# Patient Record
Sex: MALE | Race: WHITE | NOT HISPANIC OR LATINO | Employment: UNEMPLOYED | URBAN - METROPOLITAN AREA
[De-identification: names, ages, dates, MRNs, and addresses within clinical notes are randomized per-mention and may not be internally consistent; named-entity substitution may affect disease eponyms.]

---

## 2017-04-29 LAB
EST. AVERAGE GLUCOSE BLD GHB EST-MCNC: 123 (CALC)
EST. AVERAGE GLUCOSE BLD GHB EST-SCNC: 6.8 (CALC)
HBA1C MFR BLD: 5.9 % OF TOTAL HGB

## 2018-06-22 ENCOUNTER — TELEPHONE (OUTPATIENT)
Dept: FAMILY MEDICINE CLINIC | Facility: CLINIC | Age: 58
End: 2018-06-22

## 2018-06-25 ENCOUNTER — TELEPHONE (OUTPATIENT)
Dept: FAMILY MEDICINE CLINIC | Facility: CLINIC | Age: 58
End: 2018-06-25

## 2018-06-26 ENCOUNTER — TELEPHONE (OUTPATIENT)
Dept: FAMILY MEDICINE CLINIC | Facility: CLINIC | Age: 58
End: 2018-06-26

## 2018-06-26 DIAGNOSIS — I63.89 CEREBROVASCULAR ACCIDENT (CVA) DUE TO OTHER MECHANISM (HCC): Primary | ICD-10-CM

## 2018-06-26 NOTE — TELEPHONE ENCOUNTER
Francesco from patient insurance company called stated that patient asked him to call the office to see if he could get  Threw that he is waiting for a script for physical therapy I did send a message on 6/25/18 also to you I informed isrrael from the insurance company that we just went live with new computer system that patients need to be patience with everyone      Pratima Sims

## 2018-06-28 ENCOUNTER — TELEPHONE (OUTPATIENT)
Dept: FAMILY MEDICINE CLINIC | Facility: CLINIC | Age: 58
End: 2018-06-28

## 2018-06-28 RX ORDER — LORAZEPAM 1 MG/1
1 TABLET ORAL EVERY 6 HOURS PRN
COMMUNITY
Start: 2018-04-03 | End: 2018-08-06 | Stop reason: SDUPTHER

## 2018-06-28 RX ORDER — LEVETIRACETAM 1000 MG/1
1000 TABLET ORAL 2 TIMES DAILY
COMMUNITY
Start: 2018-05-29 | End: 2018-06-29 | Stop reason: SDUPTHER

## 2018-06-28 RX ORDER — GABAPENTIN 100 MG/1
1 CAPSULE ORAL 2 TIMES DAILY
Refills: 3 | COMMUNITY
Start: 2018-06-04 | End: 2018-08-15 | Stop reason: SDUPTHER

## 2018-06-28 RX ORDER — ATORVASTATIN CALCIUM 20 MG/1
1 TABLET, FILM COATED ORAL DAILY
COMMUNITY
Start: 2018-04-11 | End: 2018-11-12 | Stop reason: SDUPTHER

## 2018-06-28 RX ORDER — LEVETIRACETAM 1000 MG/1
1000 TABLET ORAL EVERY 12 HOURS
Refills: 0 | COMMUNITY
Start: 2018-05-29 | End: 2018-06-28 | Stop reason: SDUPTHER

## 2018-06-28 RX ORDER — LOSARTAN POTASSIUM 50 MG/1
1 TABLET ORAL DAILY
COMMUNITY
Start: 2018-04-05 | End: 2019-06-07 | Stop reason: SDUPTHER

## 2018-06-28 RX ORDER — ASPIRIN 81 MG/1
1 TABLET, CHEWABLE ORAL EVERY 24 HOURS
COMMUNITY
Start: 2014-02-25

## 2018-06-28 RX ORDER — SERTRALINE HYDROCHLORIDE 100 MG/1
1 TABLET, FILM COATED ORAL DAILY
COMMUNITY
Start: 2018-04-11 | End: 2019-06-12 | Stop reason: SDUPTHER

## 2018-06-28 RX ORDER — LANCETS
EACH MISCELLANEOUS DAILY
COMMUNITY
Start: 2016-12-28 | End: 2021-08-18 | Stop reason: SDUPTHER

## 2018-06-28 NOTE — TELEPHONE ENCOUNTER
Pt called stating he needs a refill on his Levetracin  He states he takes it twice a day but only has two left  He did state that it needs a prior [facility]  Pt can be reached at [phone]

## 2018-06-29 ENCOUNTER — TELEPHONE (OUTPATIENT)
Dept: NEUROLOGY | Facility: CLINIC | Age: 58
End: 2018-06-29

## 2018-06-29 DIAGNOSIS — G40.89 OTHER SEIZURES (HCC): Primary | ICD-10-CM

## 2018-06-29 RX ORDER — LEVETIRACETAM 1000 MG/1
1000 TABLET ORAL 2 TIMES DAILY
Qty: 60 TABLET | Refills: 5 | Status: SHIPPED | OUTPATIENT
Start: 2018-06-29 | End: 2018-07-24 | Stop reason: SDUPTHER

## 2018-07-24 ENCOUNTER — APPOINTMENT (OUTPATIENT)
Dept: LAB | Facility: HOSPITAL | Age: 58
End: 2018-07-24
Payer: COMMERCIAL

## 2018-07-24 ENCOUNTER — OFFICE VISIT (OUTPATIENT)
Dept: NEUROLOGY | Facility: CLINIC | Age: 58
End: 2018-07-24
Payer: COMMERCIAL

## 2018-07-24 VITALS
HEIGHT: 70 IN | SYSTOLIC BLOOD PRESSURE: 113 MMHG | RESPIRATION RATE: 18 BRPM | BODY MASS INDEX: 27.03 KG/M2 | WEIGHT: 188.8 LBS | HEART RATE: 79 BPM | DIASTOLIC BLOOD PRESSURE: 70 MMHG

## 2018-07-24 DIAGNOSIS — G40.89 OTHER SEIZURES (HCC): ICD-10-CM

## 2018-07-24 DIAGNOSIS — I69.354 HEMIPARESIS AFFECTING LEFT SIDE AS LATE EFFECT OF CEREBROVASCULAR ACCIDENT (HCC): ICD-10-CM

## 2018-07-24 DIAGNOSIS — G40.89 OTHER SEIZURES (HCC): Primary | ICD-10-CM

## 2018-07-24 DIAGNOSIS — Z72.0 TOBACCO ABUSE: ICD-10-CM

## 2018-07-24 DIAGNOSIS — I65.23 CAROTID STENOSIS, BILATERAL: ICD-10-CM

## 2018-07-24 LAB
ALBUMIN SERPL BCP-MCNC: 4.1 G/DL (ref 3–5.2)
ALP SERPL-CCNC: 62 U/L (ref 43–122)
ALT SERPL W P-5'-P-CCNC: 35 U/L (ref 9–52)
ANION GAP SERPL CALCULATED.3IONS-SCNC: 10 MMOL/L (ref 5–14)
AST SERPL W P-5'-P-CCNC: 17 U/L (ref 17–59)
BASOPHILS # BLD AUTO: 0.1 THOUSANDS/ΜL (ref 0–0.1)
BASOPHILS NFR BLD AUTO: 1 % (ref 0–1)
BILIRUB SERPL-MCNC: 0.5 MG/DL
BUN SERPL-MCNC: 21 MG/DL (ref 5–25)
CALCIUM SERPL-MCNC: 9.3 MG/DL (ref 8.4–10.2)
CHLORIDE SERPL-SCNC: 104 MMOL/L (ref 97–108)
CO2 SERPL-SCNC: 25 MMOL/L (ref 22–30)
CREAT SERPL-MCNC: 0.5 MG/DL (ref 0.7–1.5)
EOSINOPHIL # BLD AUTO: 0.2 THOUSAND/ΜL (ref 0–0.4)
EOSINOPHIL NFR BLD AUTO: 3 % (ref 0–6)
ERYTHROCYTE [DISTWIDTH] IN BLOOD BY AUTOMATED COUNT: 13.2 %
GFR SERPL CREATININE-BSD FRML MDRD: 121 ML/MIN/1.73SQ M
GLUCOSE P FAST SERPL-MCNC: 114 MG/DL (ref 70–99)
HCT VFR BLD AUTO: 43.4 % (ref 41–53)
HGB BLD-MCNC: 14.9 G/DL (ref 13.5–17.5)
LYMPHOCYTES # BLD AUTO: 1.3 THOUSANDS/ΜL (ref 0.5–4)
LYMPHOCYTES NFR BLD AUTO: 19 % (ref 20–50)
MCH RBC QN AUTO: 30.9 PG (ref 26–34)
MCHC RBC AUTO-ENTMCNC: 34.4 G/DL (ref 31–36)
MCV RBC AUTO: 90 FL (ref 80–100)
MONOCYTES # BLD AUTO: 0.4 THOUSAND/ΜL (ref 0.2–0.9)
MONOCYTES NFR BLD AUTO: 6 % (ref 1–10)
NEUTROPHILS # BLD AUTO: 4.8 THOUSANDS/ΜL (ref 1.8–7.8)
NEUTS SEG NFR BLD AUTO: 71 % (ref 45–65)
PLATELET # BLD AUTO: 286 THOUSANDS/UL (ref 150–450)
PMV BLD AUTO: 9.2 FL (ref 8.9–12.7)
POTASSIUM SERPL-SCNC: 4 MMOL/L (ref 3.6–5)
PROT SERPL-MCNC: 7.1 G/DL (ref 5.9–8.4)
RBC # BLD AUTO: 4.82 MILLION/UL (ref 4.5–5.9)
SODIUM SERPL-SCNC: 139 MMOL/L (ref 137–147)
WBC # BLD AUTO: 6.7 THOUSAND/UL (ref 4.5–11)

## 2018-07-24 PROCEDURE — 80053 COMPREHEN METABOLIC PANEL: CPT

## 2018-07-24 PROCEDURE — 99214 OFFICE O/P EST MOD 30 MIN: CPT | Performed by: PSYCHIATRY & NEUROLOGY

## 2018-07-24 PROCEDURE — 85025 COMPLETE CBC W/AUTO DIFF WBC: CPT

## 2018-07-24 PROCEDURE — 36415 COLL VENOUS BLD VENIPUNCTURE: CPT

## 2018-07-24 PROCEDURE — 80177 DRUG SCRN QUAN LEVETIRACETAM: CPT

## 2018-07-24 RX ORDER — LEVETIRACETAM 1000 MG/1
1000 TABLET ORAL 2 TIMES DAILY
Qty: 60 TABLET | Refills: 5 | Status: SHIPPED | OUTPATIENT
Start: 2018-07-24 | End: 2019-06-20 | Stop reason: SDUPTHER

## 2018-07-24 NOTE — ASSESSMENT & PLAN NOTE
Known bilateral carotid disease with an apparent occlusion on right, chronic and with recent follow-up study suggesting a non flow consequential stenosis on left ( 50-60% and essentially unchanged )  -- to continue his daily 81 mg aspirin  --to continue his ongoing work with his primary care physician for vascular risk factor reduction  -- will continue his ongoing follow-up with vascular surgery

## 2018-07-24 NOTE — ASSESSMENT & PLAN NOTE
Single seizure event which occurred on December 12, 2014  Focus appears to be established by problem 2  Remains on  Keppra 1000 mg twice daily with no adverse side effects and no further events  -- continue  Keppra 1000 mg Twice daily  -- with him chronically on Keppra, CBC, CMP and Keppra level

## 2018-07-24 NOTE — PROGRESS NOTES
Patient is here today for a follow up for his stroke     Patient ID: Garrett Caputo is a 66 y o  male  Assessment/Plan:    Other seizures ([facility] [address] )  Single seizure event which occurred on December 12, 2014  Focus appears to be established by problem 2  Remains on  Keppra 1000 mg twice daily with no adverse side effects and no further events  -- continue  Keppra 1000 mg Twice daily  -- with him chronically on Keppra, CBC, CMP and Keppra level  Hemiparesis affecting left side as late effect of cerebrovascular accident ([facility] [address] )    Stable  -- to begin a course of physical therapy  Directed at left hand and arm  Carotid stenosis, bilateral  Known bilateral carotid disease with an apparent occlusion on right, chronic and with recent follow-up study suggesting a non flow consequential stenosis on left ( 50-60% and essentially unchanged )  -- to continue his daily 81 mg aspirin  --to continue his ongoing work with his primary care physician for vascular risk factor reduction  -- will continue his ongoing follow-up with vascular surgery  Tobacco abuse  Long discussion with patient today  He is making efforts on his own, without medications support a to eventually quit smoking  States that he is now down to less then 1/4 pack per day  Would like to continue effort on his own before seeking additional assist   -- to continue his efforts unassisted  -- told that should his efforts fail he should contact to the office here or his primary care physician regarding cessation assist     I spent a total of 25 min with the patient with greater than 50% of that time spent counseling and coordinating his care, specifically discussing his diagnosis, additional tests, and discussing the case with his care team, as detailed above  He will follow up in   Six months or p r n     Subjective:    HPI   Patient, now 66years of age, follows here for several issues    He does have a history of a single seizure event which occurred in December of 2014  The focus for that event was apparently established by a stroke involving the right hemisphere which occurred in January of 2014  He has fortunately had no further seizure events as he remains on Keppra 1000 mg twice daily  He has had no symptoms to suggest any new cerebral vascular event  However, he continues to exhibit his left max paresis, predominantly involving his left upper extremity  He has known carotid disease bilaterally  Studies are supporting a chronic occlusion of the right internal carotid artery  Most recent carotid ultrasound, perform this spring was reviewed and demonstrates his known occlusion and a 50-60%, non flow consequential, stenosis involving the left  This is essentially unchanged from his past studies  He continues his ongoing work with vascular surgery  Past Medical History:   Diagnosis Date    Depressive disorder     Dyslipidemia     Hypertension     Seizure [facility])      History reviewed  No pertinent surgical history    Social History     Social History    Marital status: Single     Spouse name: N/A    Number of children: N/A    Years of education: N/A     Social History Main Topics    Smoking status: Current Every Day Smoker    Smokeless tobacco: Current User    Alcohol use Yes      Comment: occasional    Drug use: No    Sexual activity: Not Asked     Other Topics Concern    None     Social History Narrative    None     Family History   Problem Relation Age of Onset    Hypertension Family     Diabetes Family      Allergies   Allergen Reactions    No Active Allergies        Current Outpatient Prescriptions:     aspirin 81 mg chewable tablet, Chew 1 tablet every 24 hours, Disp: , Rfl:     atorvastatin (LIPITOR) 20 mg tablet, Take 1 tablet by mouth daily, Disp: , Rfl:     gabapentin (NEURONTIN) 100 mg capsule, Take 1 capsule by mouth 2 (two) times a day, Disp: , Rfl: 3    glucose blood test strip, daily, Disp: , Rfl:    glucose blood test strip, daily, Disp: , Rfl:     Lancets (ONETOUCH ULTRASOFT) lancets, daily, Disp: , Rfl:     levETIRAcetam (KEPPRA) 1000 MG tablet, Take 1 tablet (1,000 mg total) by mouth 2 (two) times a day, Disp: 60 tablet, Rfl: 5    LORazepam (ATIVAN) 1 mg tablet, Take 1 tablet by mouth every 6 (six) hours as needed, Disp: , Rfl:     losartan (COZAAR) 50 mg tablet, Take 1 tablet by mouth daily, Disp: , Rfl:     metFORMIN (GLUCOPHAGE) 1000 MG tablet, Take 1 tablet by mouth Every 12 hours, Disp: , Rfl:     sertraline (ZOLOFT) 100 mg tablet, Take 1 tablet by mouth daily, Disp: , Rfl:     Objective:    Blood pressure 113/70, pulse 79, resp  rate 18, height 5' 10" (1 778 m), weight 85 6 kg (188 lb 12 8 oz)  Physical Exam  Head normocephalic  Eyes nonicteric  Soft left carotid bruit heard  Lungs clear to auscultation  Rhythm regular  GI ( abdomen ) soft nontender with bowel sounds present  No lower extremity edema  Neurological Exam   Alert  Pleasantly and appropriately conversational and fully oriented  No significant dysarthria  Left hemiparetic gait, but continues to be gait independent  Cranial nerves 2-12 tested and grossly intact except for the presence of a left central facial weakness week, which is unchanged from the past   Good testable strength throughout the right arm and right leg  Unchanged left max paresis  Diminished pin appreciation left side as compared to right but with position sense appearing to be intact  Muscle stretch reflexes increased on left as compared to right  ROS:    Review of Systems   Constitutional: Positive for fatigue  Negative for appetite change and fever  HENT: Negative  Negative for hearing loss, tinnitus, trouble swallowing and voice change  Eyes: Negative  Negative for photophobia and pain  Respiratory: Negative  Negative for shortness of breath  Cardiovascular: Negative  Negative for palpitations  Gastrointestinal: Positive for constipation  Negative for nausea and vomiting  Endocrine: Negative  Negative for cold intolerance and heat intolerance  Genitourinary: Positive for frequency and urgency  Negative for dysuria  Musculoskeletal: Positive for gait problem  Negative for myalgias and neck pain  From stroke   Skin: Negative  Negative for rash  Allergic/Immunologic: Negative  Neurological: Positive for headaches (at times)  Negative for dizziness, tremors, seizures, syncope, facial asymmetry, speech difficulty, weakness, light-headedness and numbness  Hematological: Negative  Does not bruise/bleed easily  Psychiatric/Behavioral: Negative for confusion, hallucinations and sleep disturbance (at times)  The patient is nervous/anxious  I personally reviewed the ROS that was entered by the medical assistant

## 2018-07-24 NOTE — ASSESSMENT & PLAN NOTE
Long discussion with patient today  He is making efforts on his own, without medications support a to eventually quit smoking  States that he is now down to less then 1/4 pack per day  Would like to continue effort on his own before seeking additional assist   -- to continue his efforts unassisted      -- told that should his efforts fail he should contact to the office here or his primary care physician regarding cessation assist

## 2018-07-24 NOTE — LETTER
July 24, 2018     Merlin Irwin MD  [address] [address]    Patient: April Ansari   YOB: 1960   Date of Visit: 7/24/2018       Dear Dr Pastor Carrera:    Thank you for referring Adonis Lovett to me for evaluation  Below are my notes for this consultation  If you have questions, please do not hesitate to call me  I look forward to following your patient along with you           Sincerely,        Majo Guerra MD        CC: Daphne Pelletier MD

## 2018-07-24 NOTE — PATIENT INSTRUCTIONS
Continue your unassisted efforts at stopping smoking  If you eventually feel that you need assistance with quitting, for example the patch, please contact us here or your primary doctor, Dr Pastor Carrera  Continue your levetiracetam  (Keppra) 1000 mg tablets 1 tablet twice daily  Please get your blood work drawn soon  Continue year ongoing medical follow-up with with Dr Pastor Carrera and his associates

## 2018-07-26 LAB — LEVETIRACETAM SERPL-MCNC: 31.6 UG/ML (ref 10–40)

## 2018-07-30 ENCOUNTER — EVALUATION (OUTPATIENT)
Dept: PHYSICAL THERAPY | Facility: REHABILITATION | Age: 58
End: 2018-07-30
Payer: COMMERCIAL

## 2018-07-30 DIAGNOSIS — I69.354 HEMIPARESIS AFFECTING LEFT SIDE AS LATE EFFECT OF CEREBROVASCULAR ACCIDENT (HCC): Primary | ICD-10-CM

## 2018-07-30 DIAGNOSIS — G89.29 CHRONIC LEFT SHOULDER PAIN: ICD-10-CM

## 2018-07-30 DIAGNOSIS — M25.512 CHRONIC LEFT SHOULDER PAIN: ICD-10-CM

## 2018-07-30 PROCEDURE — G8979 MOBILITY GOAL STATUS: HCPCS

## 2018-07-30 PROCEDURE — G8978 MOBILITY CURRENT STATUS: HCPCS

## 2018-07-30 PROCEDURE — 97162 PT EVAL MOD COMPLEX 30 MIN: CPT

## 2018-07-30 NOTE — PROGRESS NOTES
PT Evaluation     Today's date: 2018  Patient name: Paula Genao  : 1960  MRN: [id number]  Referring provider: Adan Camacho MD  Dx:   Encounter Diagnosis     ICD-10-CM    1  Hemiparesis affecting left side as late effect of cerebrovascular accident [facility]) I69 354    2  Chronic left shoulder pain M25 512     G89 29                   Assessment  Impairments: abnormal gait, abnormal muscle firing, abnormal muscle tone and abnormal movement    Assessment details: Presents with long standing hemiparesis due to CVA  Upon PT exam he demonstrates hemiparetic gait, but it is very functional and he is a low fall risk on standardized balance testing  He expressed his primary limitation is his left arm with ADL's and that is the reason he seeks therapy  He was noted to have significant spasticity, weakness, and ROM limitations in his left UE which definitely warrants therapy  After speaking with the patient at length I recommend he follow up with occupational therapy for Left UE hemiparesis as this would be their area of expertise to maximize his UE function  The patient is agreeable with the plan and we will discharge physical therapy  Understanding of Dx/Px/POC: good   Prognosis: fair    Goals  Refer to Occupational Therapy    Plan  Patient would benefit from: skilled occupational therapy and OT eval  Referral necessary: Yes  Plan details: We will discharge physical therapy and the patient was referred and scheduled for occupational therapy  The patient will require a referral for occupational therapy from your office  Thank you for the consultation  Subjective Evaluation    History of Present Illness  Mechanism of injury: Patient reports having Right CVA 2014 affecting his left hemibody  He reports some difficulty with ambulating, bit does not use AFO any longer  He expressed he would like to have his arm move "better"  He denies left arm pain   He reports increased tightness in his left arm in the AM  Functionally, he is unable to use his Left UE for any ADL's and would like to see if he could try to increase his left arm strength and function  Not a recurrent problem   Quality of life: good    Pain  No pain reported    Social Support  Steps to enter house: no  Stairs in house: no   Lives in: apartment  Lives with: alone          Objective   Vision- Full [employer], slight saccadic pursuit  Motor- Note 2/4 spasticity left UE elbow flexors (Rk Scale)  Left shld flex/ abd 1/2 AROM, elbow flex full antigravity, elbow ext  Full antigravity, Wrist flex / ext 1/4 AROM/PROM- note significant tone, Left hand/ - note flexion contracture with no digit AROM, able to fully extend digits PROM, left shoulder (+) Sulcus (2 finger)  Right hemibody 5/5 t/o  Left LLE 1/4 spasticity left quad (Rk Scale), Full AROM LLE except AROM DF -10, Strength grossly 4/5 avail range LLE  Balance- (-) Romberg, 49/56 Martinez Balance Scale  Gait- Left hemiparetic gait with noted left UE flexor spasticity, note slight left genu recurvatum and reduced left heel strike with initial contact (foot flat initial contact), able to turn head and negotiate obstacles without loss of balance    Gait Speed-  98 m/second without device        Precautions: Seizures    Daily Treatment Diary     Manual                                                                                   Exercise Diary                                                                                                                                                                                                                                                                                      Modalities

## 2018-07-30 NOTE — PATIENT INSTRUCTIONS
Patient instructed with Left gastroc stretch at counter, 30 seconds x3, 2x/day  Patient demo understanding

## 2018-08-06 DIAGNOSIS — F41.9 ANXIETY: Primary | ICD-10-CM

## 2018-08-06 RX ORDER — LORAZEPAM 1 MG/1
1 TABLET ORAL EVERY 6 HOURS PRN
Qty: 30 TABLET | Refills: 1 | Status: SHIPPED | OUTPATIENT
Start: 2018-08-06 | End: 2018-11-12 | Stop reason: SDUPTHER

## 2018-08-15 ENCOUNTER — EVALUATION (OUTPATIENT)
Dept: OCCUPATIONAL THERAPY | Facility: REHABILITATION | Age: 58
End: 2018-08-15
Payer: COMMERCIAL

## 2018-08-15 DIAGNOSIS — R56.9 SEIZURE (HCC): Primary | ICD-10-CM

## 2018-08-15 DIAGNOSIS — I69.354 HEMIPARESIS AFFECTING LEFT SIDE AS LATE EFFECT OF CEREBROVASCULAR ACCIDENT (HCC): Primary | ICD-10-CM

## 2018-08-15 PROCEDURE — 97166 OT EVAL MOD COMPLEX 45 MIN: CPT | Performed by: OCCUPATIONAL THERAPIST

## 2018-08-15 PROCEDURE — G8990 OTHER PT/OT CURRENT STATUS: HCPCS | Performed by: OCCUPATIONAL THERAPIST

## 2018-08-15 PROCEDURE — G8991 OTHER PT/OT GOAL STATUS: HCPCS | Performed by: OCCUPATIONAL THERAPIST

## 2018-08-15 RX ORDER — GABAPENTIN 100 MG/1
100 CAPSULE ORAL 2 TIMES DAILY
Qty: 180 CAPSULE | Refills: 3 | Status: SHIPPED | OUTPATIENT
Start: 2018-08-15 | End: 2019-08-02 | Stop reason: SDUPTHER

## 2018-08-15 NOTE — PROGRESS NOTES
OCCUPATIONAL THERAPY INITIAL EVALUATION:    08/15/2018  Karie Porras  1960  [id number]  Katlin Lora MD  No diagnosis found  Subjective Evaluation    Quality of life: good          "When I wake up in the morning it can open, and then it stiffens up towards the afternoon "    PATIENT GOAL: "I just want to improve the use of my hand  I want to be able to keep my hand opened more"    HISTORY OF PRESENT ILLNESS:     Pt is a 66 y o  male who was referred to Occupational Therapy s/p  Hemiparesis affecting L side as late effect of CVA  Pt reports having CVA in January 2014  Pt with initial symptoms of weakness on L side of body when in working environment  Pt was immediately taking to [address] [place] [address] with LOS x 1 month approximately and then was transferred to [facility] where Pt engaged in PT/OT services  Pt was then was D/Ankit to home environment and participated in PT/OT services at [facility]  Pt then transferred OT/PT services to [place] [address] Patient services  Pt reports having aide attend home environment every Monday for x 3 hours to complete laundry and mop/vacuum  Pt with history of x1 seizure in December 2014; no seizure activity since  Pt reports noticing regression of functional use of LUE since D/Ankit from last therapy services  Pt with increased tone affecting L AROM  Pt reports no RHS worn  Pt lives in a single floor apartment on 6th floor with elevator access with son  Pt mod I with all ADLs at this time  Pt required aide to assist with IADLs, though reports completing cooking, dishes, and garbage at mod I status  Pt worked as a  prior to CVA-- Pt loss role of worker  Pt on LT disability at this time  Pt continues the role of  with no difficulties reported        PMH:   Past Medical History:   Diagnosis Date    CAD (coronary artery disease) 01/15/2014    100% occlusion of right    [facility]ce[facility] ([facility] ) 01/15/2014    Depressive disorder     Dyslipidemia     Erectile dysfunction 2009    Hemiparesis ([place] [address] ) 01/15/2014    left    Hypertension     Seizure ([place] [address] ) 2014         Pain Levels:     Restin    With Activity:  0    Objective     Functional Assessment     Comments  See impairment section for further details  Impairment Observations:  1  Decreased functional use of LUE  2  Increased tone  3  Max difficulties terminating flexors on command  4  1/2 thumb size sublux L shoulder  5  Decreased muscle endurance throughout LUE  6  Exertional L tremors      Dynamometer Position #2:   R: 65  L: 15  Pt is L hand dominant    Action:  3 point pinch: R 15 and L 5 5  2 point pinch: R 11 and L unable 2* Max difficulties terminating flexors on command  Lateral pinch: R 16 5 and L 10    9-hole Peg Test: Unable to perform 2* L hemiparesis      L AROM:  Shoulder elevation: full  Shoulder FF: near 1/4 with short lever and exertional tremors  Shoulder ABD: near 1/2 with short lever  ER: 1/2  IR: 3/4   Elbow ext: full  Elbow flex: near full  Sup: near 3/4 with flexed digits  Pron: full  Wrist flex: neutral  Wrist ext: near 1/4  Composite: 3/4 with thumb nmedially  Hook: 3/4  Opposition: unable to perform 2* Max difficulties with terminating flexors on command  Finger to nose: unable to peform  Dysdiadochokinesia: unable to perform    SUPINE L AROM:  Shoulder FF: 1/2 with long lever  Shoulder ABD: 1/4 with long lever  Elbow ext: full  Elbow flex: 3/4  Sup: neutral  Pron: full  Tricep AG: 3/4 with difficulties terminating flexors on command 2* tone  Horizontal ABD: full  Horizontal ADD: near full    1/2 thumb size sublux in L shoulder  Symmetrical upright seated posture B/L shoulders     Assessment    Assessment details: See skilled analysis for further details           Skilled Analysis:  Pt is a 66 y o  male referred to Occupational Therapy s/p hemiparesis affecting L side as late effect of CVA   Pt presents with decreased functional use of LUE, Max difficulties terminating flexors on command affecting normal movement patterns, increased tone affecting L AROM, 1/2 thumb size sublux in L shoulder, exertional L tremors, and decreased L muscle endurance affecting engagement in meaningful occupations  Pt will benefit from skilled Occupational Therapy services 2x/week for 12 weeks with focus on neuro re-ed, RHS fabrication, manual tx, BIONESS (if cleared by neurologist--awaiting for clearance), and self-care management to increase functional use of LUE and for overall improved QOL       Short Term Goals:  - Pt will demo with decreased LUE hypertonicity for improved grasp release, termination of flexors on command  50% of time 4 weeks  - Pt will increase LUE to functional assist with <20% cuing for tabletop tasks and engagement in salient tasks 4 weeks  - Pt will tolerate BIONESS for improved motor and sensory performance for overall improved hand to target with 80% accuracy 4 weeks (if cleared by neurologist, awaiting clearance)  - Pt will demo good carryover of clinic and home tone reduction strategies for improved L AROM initiation with functional reach, dressing, hygiene 4 weeks  - Pt will increase compliance with  RHS or C-Roll for improved LPS with  improved fit/decreased discomfort with wear time 4 weeks  - Pt will demo with G carryover of Home Exercise Program to improve functional progression towards goals in Plan of care and for improved functional use of LUE 4 weeks        Long Term Goals:  - Pt will tolerate RHS or C-Roll x 6-8 hours for tendon elongation and  decreased tone in left hand  - Pt will demo with decreased hypertonicity of  L  UE to WNL for automatic grasp/ release  and functional reach with grooming  - Pt will demo with decreased exertional tremors with functional reach for normalized movement pattern of  L  UE  - Pt will resume  L  hand dominance to refined functional assist with all activities of daily living and engagement in salient tasks  - Pt will demo with decreased hypertonicity of LUE to WNL for improved alternate motor patterns, termination on command for improved dressing/hygiene and engagement in salient tasks        OTHER PLANNED THERAPY INTERVENTIONS:   Supine, seated, and in stance neuro re-ed  Tricep AG  BIONESS (received clearance by neurologist)  Manual tx  Seated functional reach: crossing midline  Supine place and hold  WBearing strategies  Approximate pad pinch         INTERVENTION COMMENTS:  Diagnosis: I69 354  Precautions: CAD, Depression, Seizure (x1 occurring in December 2014), HTN  FOTO: 60, with 90% limitation in Hand function and 37% limitation in UE function  Insurance: Payor: [employer] REP / Plan: Freddie JOLLY [address] REP / Product Type: Medicare PPO /   1 of ____ visits, PN due 09/15/2018

## 2018-08-23 ENCOUNTER — OFFICE VISIT (OUTPATIENT)
Dept: OCCUPATIONAL THERAPY | Facility: REHABILITATION | Age: 58
End: 2018-08-23
Payer: COMMERCIAL

## 2018-08-23 DIAGNOSIS — I69.354 HEMIPARESIS AFFECTING LEFT SIDE AS LATE EFFECT OF CEREBROVASCULAR ACCIDENT (HCC): Primary | ICD-10-CM

## 2018-08-23 PROCEDURE — 97140 MANUAL THERAPY 1/> REGIONS: CPT

## 2018-08-23 PROCEDURE — 97112 NEUROMUSCULAR REEDUCATION: CPT

## 2018-08-23 NOTE — PROGRESS NOTES
Daily Note     Today's date: 2018  Patient name: Yumi Monaco  : 1960  MRN: [id number]  Referring provider: Emanuel Cross MD  Dx:   Encounter Diagnosis   Name Primary?  Hemiparesis affecting left side as late effect of cerebrovascular accident ([facility] [address] ) Yes                  Subjective: "If I could just hold something with this hand I'd be happy"  Objective: See treatment diary below      Assessment: Tolerated treatment well  Patient would benefit from continued OT   Pt tolerated BIONESS for 10 min for neuro re-ed, panels A/A  IASTM (form on file) to LUE for tone reduction and muscle facilitation, increased edema noted in left hand  Supine on mat with place and hold of LUE, x3, AROM FF, elbow extension (AG), eccentric/concentric contraction w/termination on command and G control of UE noted  Educated pt on HEP to increase ROM, G carryover  Pt completed  AROM in FF, HR ABD, ADD, punch ups, x5  Therapist to assess carryover next session and provide WHEP          Plan: Continued skilled OT per POC    INTERVENTION COMMENTS:  Diagnosis: I69 354  Precautions: CAD, Depression, Seizure (x1 occurring in 2014), HTN  FOTO: 60, with 90% limitation in Hand function and 37% limitation in UE function  Insurance: Payor: [employer]  REP / Plan: Jessica JOLLY  REP / Product Type: Medicare PPO   2 of ____ visits, PN due 09/15/2018

## 2018-08-27 ENCOUNTER — OFFICE VISIT (OUTPATIENT)
Dept: OCCUPATIONAL THERAPY | Facility: REHABILITATION | Age: 58
End: 2018-08-27
Payer: COMMERCIAL

## 2018-08-27 DIAGNOSIS — I69.354 HEMIPARESIS AFFECTING LEFT SIDE AS LATE EFFECT OF CEREBROVASCULAR ACCIDENT (HCC): Primary | ICD-10-CM

## 2018-08-27 PROCEDURE — 97112 NEUROMUSCULAR REEDUCATION: CPT | Performed by: OCCUPATIONAL THERAPIST

## 2018-08-27 PROCEDURE — 97535 SELF CARE MNGMENT TRAINING: CPT | Performed by: OCCUPATIONAL THERAPIST

## 2018-08-27 NOTE — PROGRESS NOTES
Daily Note     Today's date: 2018  Patient name: Parish Ruff  : 1960  MRN: [id number]  Referring provider: Noam Chirinos MD  Dx: No diagnosis found  Subjective: "This exercise is a really good one  Look at how loose my hand is now"  Objective: See treatment description below      Assessment: Tolerated treatment well  Patient would benefit from continued OT     Supine neuro re-ed performed with BIONESS donned on neuro mod setting x 10 minutes-- Pt performed 1 set of 10 reps of ceiling punch ups beginning position at shoulder ABD, shoulder FF, and motor combination (shoulder ABD, shoulder ADD, and elbow ext)  Pt tolerated well with ataxia evident with increased fatigue levels 2* poor muscle endurance  Pt advanced to performed shoulder FF with #3 t-bar with focus on improved ,muscle endurance, B/L coordination, and improved symmetrical movements-- Pt performed well with excellent eccentric/concentric control  Pt with self-report of motor action becoming less challenging with massed practice  WBearing to BUE in quadruped with focus on improved wrist/digit extension-- Pt tolerated well with abilities to perform x3 sets of 10 reps of modified push ups with significant decreased tone/stiffness in wrist and digits  Pt arrived with edema in L hand/wrist, with significant reduced edema evident after functional task  Approximated pad pinch with Max difficulties terminating flexors on command-- OTR to continue to focus on for improved grasp/release abilities  OTR with plans to place BIONESS on open/close grasp release functions  OTR with plans to contact splint company to obtain RHS for patient to prevent further tendon shortening  Plan: Continue per plan of care         INTERVENTION COMMENTS:  Diagnosis: I69 354  Precautions: CAD, Depression, Seizure (x1 occurring in 2014), HTN  FOTO: 60, with 90% limitation in Hand function and 37% limitation in UE function  Insurance: Payor: [employer]  REP / Plan: [facility] REP / Product Type: Medicare PPO   3 of ____ visits, PN due 09/15/2018

## 2018-08-28 ENCOUNTER — TELEPHONE (OUTPATIENT)
Dept: FAMILY MEDICINE CLINIC | Facility: CLINIC | Age: 58
End: 2018-08-28

## 2018-08-28 NOTE — TELEPHONE ENCOUNTER
Called patient left a message that his appointment for 9/18/18 at 11 am is cancelled with dr Jessica Gillis he isn't in the office this day so appointment was cancelled and left a message to call the office back to make another appointment for a 6 mo follow up

## 2018-08-30 ENCOUNTER — OFFICE VISIT (OUTPATIENT)
Dept: OCCUPATIONAL THERAPY | Facility: REHABILITATION | Age: 58
End: 2018-08-30
Payer: COMMERCIAL

## 2018-08-30 DIAGNOSIS — I69.354 HEMIPARESIS AFFECTING LEFT SIDE AS LATE EFFECT OF CEREBROVASCULAR ACCIDENT (HCC): Primary | ICD-10-CM

## 2018-08-30 PROCEDURE — 97112 NEUROMUSCULAR REEDUCATION: CPT

## 2018-08-30 PROCEDURE — 97140 MANUAL THERAPY 1/> REGIONS: CPT

## 2018-08-30 NOTE — PROGRESS NOTES
Daily Note     Today's date: 2018  Patient name: Benjamin Quintana  : 1960  MRN: [id number]  Referring provider: Vilma Lugo MD  Dx:   Encounter Diagnosis   Name Primary?  Hemiparesis affecting left side as late effect of cerebrovascular accident ([facility] [address] ) Yes                  Subjective: "You know, I was really beat by the time I left last session"  Objective: See treatment diary below      Assessment: Tolerated treatment well  Patient would benefit from continued OT   Increase edema noted in hand and L forearm this session  Pt tolerated BIONESS for 10 min followed by open hand mode functional training, responding positively w/digits about 1/4 range extension  IASTM fr edema mngt and tone reduction w/min improvement noted  In quadriped w/focus on WB into LUE for strengthening and tone  for about 5 min during card match, pt reported "feeling good" but fatigued  Seated utilizing blue pball WB into ball with BLUE in forward reach, ascending from mat and lowering utilizing BLUE for moving ball fwd/bwd, min difficulty sustaining LUE on ball during movement  Discussed BOTOX w/OTR for tone reduction to hand  INTERVENTION COMMENTS:  Diagnosis: I69 354  Precautions: CAD, Depression, Seizure (x1 occurring in 2014), HTN  FOTO: 60, with 90% limitation in Hand function and 37% limitation in UE function  Insurance: Payor: YENNI MUHAMMAD REP / Plan: Malick JOLLY  REP / Product Type: Medicare PPO   4 of ____ visits, PN due 09/15/2018    Plan: Continued skilled OT per POC with focus on POC

## 2018-09-05 ENCOUNTER — OFFICE VISIT (OUTPATIENT)
Dept: OCCUPATIONAL THERAPY | Facility: REHABILITATION | Age: 58
End: 2018-09-05
Payer: COMMERCIAL

## 2018-09-05 DIAGNOSIS — I69.354 HEMIPARESIS AFFECTING LEFT SIDE AS LATE EFFECT OF CEREBROVASCULAR ACCIDENT (HCC): Primary | ICD-10-CM

## 2018-09-05 PROCEDURE — 97140 MANUAL THERAPY 1/> REGIONS: CPT | Performed by: OCCUPATIONAL THERAPIST

## 2018-09-05 PROCEDURE — 97112 NEUROMUSCULAR REEDUCATION: CPT | Performed by: OCCUPATIONAL THERAPIST

## 2018-09-05 NOTE — PROGRESS NOTES
Daily Note     Today's date: 2018  Patient name: Kaykay Richard  : 1960  MRN: [id number]  Referring provider: Renetta Rodrigues MD  Dx: No diagnosis found  Subjective: "It is so much doing that movement this time"  Objective: See treatment description below      Assessment: Tolerated treatment well  Patient would benefit from continued OT    Pain 0/10 upon arrival       Supine neuro re-ed with BIONESS donned on neuro mod setting x 10 minutes-- Pt performed 2 sets x 10 reps of motor combination (elbow flexion, ceiling punch up, elbow extension, and shoulder FF)-- Pt with significant improve motor control on this date with improve abilities to maintaining elbow extension with G eccentric control  Pt advanced to seated neuro re-ed with BIONESS donned on open/close grasp functions-- Pt performed 2 sets x 10 reps of towel slides in saggital and tranverse planes with full AAROM evident  Pt with G muscle endurance for each set/rep  Pt performed seated towel slides on mirror with focus on shoulder FF while OTR performed scap mobs simultaneously--  Pt with improved AAROM with scap mobs performed  Pt fatigued during second set resulting in decreased AAROM  IASTM to L wrist ext/flexors and digits/hand for improved tone reduction-- Pt with decreased stiffness and tone evident with manual tx  Rhae Pierre in quadruped with modified push ups with focus on improved wrist/digit extension, tone reduction, and improved proximal strengthening/posterior stability-- Pt tolerated well with Minimal assistance required to reach full elbow extension each rep  Pt with significant reduced tone/stiffness in both wrist and digits after WBearing techniques  Approximated pad pinch continuing with Max difficulties terminating flexors on command inhibiting open/grasp functions-- Pt reports noticing "fingers opening" in am hours  Plan: Continue per plan of care          INTERVENTION COMMENTS:  Diagnosis: I69 354  Precautions: CAD, Depression, Seizure (x1 occurring in December 2014), HTN  FOTO: 60, with 90% limitation in Hand function and 37% limitation in UE function  Insurance: Payor: [employer]  REP / Plan: Mckayla JOLLY  REP / Product Type: Medicare PPO   5 of ____ visits, PN due 09/15/2018

## 2018-09-06 ENCOUNTER — OFFICE VISIT (OUTPATIENT)
Dept: OCCUPATIONAL THERAPY | Facility: REHABILITATION | Age: 58
End: 2018-09-06
Payer: COMMERCIAL

## 2018-09-06 DIAGNOSIS — I69.354 HEMIPARESIS AFFECTING LEFT SIDE AS LATE EFFECT OF CEREBROVASCULAR ACCIDENT (HCC): Primary | ICD-10-CM

## 2018-09-06 PROCEDURE — 97530 THERAPEUTIC ACTIVITIES: CPT

## 2018-09-06 PROCEDURE — 97112 NEUROMUSCULAR REEDUCATION: CPT

## 2018-09-06 NOTE — PROGRESS NOTES
Daily Note     Today's date: 2018  Patient name: Saida Valdes  : 1960  MRN: [id number]  Referring provider: Kate Zavala MD  Dx:   Encounter Diagnosis   Name Primary?  Hemiparesis affecting left side as late effect of cerebrovascular accident ([facility] [address] ) Yes                  Subjective: "I really felt good when I left yesterday, its the best I felt yet"  Objective: See treatment diary below      Assessment: Tolerated treatment well  Patient would benefit from continued OT  Pt tolerated BIONESS for neuro re-ed simultaneously supine on mat engaging in MMP, 3x10 demo-G isometric control of LUE when descending to mat  HR add/abd 5's, improvement in control noted w/mass practice  Seated with BIONESS on open hand for 15 min  Block retrieval coordinating open/closed hand with grasp/release functional movement for block transfer, improvement noted w/mass practice  WB into LUE for strengthening and endurance in quadriped, crossing midline for BB retrieval using RUE to increase WB  Pt reported noticing he now has LUE arm swing with stride when walking         Plan: Continued skilled OT per POC    INTERVENTION COMMENTS:  Diagnosis: I69 354  Precautions: CAD, Depression, Seizure (x1 occurring in 2014), HTN  FOTO: 60, with 90% limitation in Hand function and 37% limitation in UE function  Insurance: Payor: [employer]  REP / Plan: Matilde JOLLY  REP / Product Type: Medicare PPO   6 of ____ visits, PN due 09/15/2018

## 2018-09-10 ENCOUNTER — OFFICE VISIT (OUTPATIENT)
Dept: OCCUPATIONAL THERAPY | Facility: REHABILITATION | Age: 58
End: 2018-09-10
Payer: COMMERCIAL

## 2018-09-10 DIAGNOSIS — I69.354 HEMIPARESIS AFFECTING LEFT SIDE AS LATE EFFECT OF CEREBROVASCULAR ACCIDENT (HCC): Primary | ICD-10-CM

## 2018-09-10 PROCEDURE — 97112 NEUROMUSCULAR REEDUCATION: CPT | Performed by: OCCUPATIONAL THERAPIST

## 2018-09-10 PROCEDURE — 97535 SELF CARE MNGMENT TRAINING: CPT | Performed by: OCCUPATIONAL THERAPIST

## 2018-09-10 NOTE — PROGRESS NOTES
Daily Note     Today's date: 9/10/2018  Patient name: Dioni Tapia  : 1960  MRN: [id number]  Referring provider: Catherine Quispe MD  Dx: No diagnosis found  Subjective: "Today is another great day"  Objective: See treatment description below    Pain 0/10 upon arrival       Supine neuro re-ed with BIONESS donned on neuro mod setting x 10 minutes while simultaneously performing 2 sets x 10 reps of motor combination (elbow flexion/extension, ceiling punch ups, horizontal ABD/ADD)-- Pt demo with significant improvement maintaining elbow ext throughout full range with no ataxia evident  Pt demo with Mod difficulties with achieving full range of horizontal ABD-- horizontal ABD to 1/2 towards end of reps  Block retrieval of block with BIONESS donned on open/close grasp setting to help facilitate improved digit extension-- Pt able to retrieve 20 blocks, place on top of table crossing midline, and bring blocks across tape line with significant improved accuracy and speed with massed practice  Pt attempted to retrieve block without BIONESS donned, though unable to complete at this time 2* Max difficulties with terminating flexors on command  [address] to LUE in quadruped for peg retrieval and placement on mirror placed at eye level-- Pt performed 2 sets with Mod fatigue reported at 2nd set  Pt with significant improved wrist/digit extension with abilities to passively range digits to full range  Approximated pad pinch with 2x abilities to terminate flexors on command for trace digit extension  Assessment: Tolerated treatment well  Patient would benefit from continued OT      Pt continues to present with improved muscle endurance improving functional performance and quality of movement resulting in improved motor control  Pt responds positively to BIONESS and WBearing strategies with significant reduced tone and increased extension in wrist/digit       Plan: Continue per plan of care       INTERVENTION COMMENTS:  Diagnosis: I69 354  Precautions: CAD, Depression, Seizure (x1 occurring in December 2014), HTN  FOTO: 60, with 90% limitation in Hand function and 37% limitation in UE function  Insurance: Payor: [employer]  REP / Plan: Henny JOLLY  REP / Product Type: Medicare PPO   7 of ____ visits, PN due 09/15/2018

## 2018-09-12 ENCOUNTER — OFFICE VISIT (OUTPATIENT)
Dept: OCCUPATIONAL THERAPY | Facility: REHABILITATION | Age: 58
End: 2018-09-12
Payer: COMMERCIAL

## 2018-09-12 DIAGNOSIS — I69.354 HEMIPARESIS AFFECTING LEFT SIDE AS LATE EFFECT OF CEREBROVASCULAR ACCIDENT (HCC): Primary | ICD-10-CM

## 2018-09-12 PROCEDURE — 97140 MANUAL THERAPY 1/> REGIONS: CPT | Performed by: OCCUPATIONAL THERAPIST

## 2018-09-12 PROCEDURE — 97112 NEUROMUSCULAR REEDUCATION: CPT | Performed by: OCCUPATIONAL THERAPIST

## 2018-09-12 NOTE — PROGRESS NOTES
Daily Note     Today's date: 2018  Patient name: April Ansari  : 1960  MRN: [id number]  Referring provider: Bipin Roque MD  Dx: No diagnosis found  Subjective: "Man did that make me really tired"  Objective: See treatment description below    Supine neuro re-ed with BIONESS donned on neuro mod setting x 10 minutes-- Pt performed 2 sets x 10 reps of motor combination (elbow flexion/extension, ceiling punch up, and shoulder FF) and horizontal ABD/ADD  BIONESS placed on open/close grasp functions to trial retrieval of blocks, though Pt presents with poor motor function on this date with the inabilities to release blocks from grasp  Supine neuro re-ed without BIONESS donned-- Pt performed 2 sets x 7 place and holds x 10 seconds placed at various planes to improve muscle endurance  Pt advanced to performing shoulder FF with OTR stopping arm at various planes to initiate place and holds at mid range  Attempted to perform approximated pad pinch with Max difficulties terminating flexors on command  OTR applied vibration over medium on L hand and digits for tone reduction-- Pt responded positively, though still unable to terminate flexors on command with trial of approximated pad pinch and release  Assessment: Tolerated treatment well  Patient would benefit from continued OT     Pt demo with Mod fatigue with supine neuro re-ed with place and holds in various plane with exertional tremors evident with fatigue  Pt presented with increased difficulties with place and holds in mid ranges verses end ranges  Pt continues to be most limited by tone levels, decreased muscle endurance, and difficulties with terminating flexors on command affecting grasp/release abilities and forwards functional reach abilities  Plan: Continue per plan of care         INTERVENTION COMMENTS:  Diagnosis: I69 354  Precautions: CAD, Depression, Seizure (x1 occurring in 2014), HTN  FOTO: 60, with 90% limitation in Hand function and 37% limitation in UE function  Insurance: Payor: YENNI  REP / Plan: Emily JOLLY  REP / Product Type: Medicare PPO   8 of ____ visits, PN due 09/15/2018

## 2018-09-17 ENCOUNTER — EVALUATION (OUTPATIENT)
Dept: OCCUPATIONAL THERAPY | Facility: REHABILITATION | Age: 58
End: 2018-09-17
Payer: COMMERCIAL

## 2018-09-17 DIAGNOSIS — I69.354 HEMIPARESIS AFFECTING LEFT SIDE AS LATE EFFECT OF CEREBROVASCULAR ACCIDENT (HCC): Primary | ICD-10-CM

## 2018-09-17 PROCEDURE — G8990 OTHER PT/OT CURRENT STATUS: HCPCS | Performed by: OCCUPATIONAL THERAPIST

## 2018-09-17 PROCEDURE — G8991 OTHER PT/OT GOAL STATUS: HCPCS | Performed by: OCCUPATIONAL THERAPIST

## 2018-09-17 PROCEDURE — 97112 NEUROMUSCULAR REEDUCATION: CPT | Performed by: OCCUPATIONAL THERAPIST

## 2018-09-17 NOTE — PROGRESS NOTES
OCCUPATIONAL THERAPY RE- EVALUATION:    09/17/2018  Anca Boys  1960  [id number]  Giancarlo Palmer MD  No diagnosis found  Subjective Evaluation    Quality of life: good          "I feel like I am doing a 100% better  I could hold my arm up to put on my deodrant today"  PATIENT GOAL: "I just want to improve the use of my hand  I want to be able to keep my hand opened more"    HISTORY OF PRESENT ILLNESS:     Pt is a 66 y o  male who was referred to Occupational Therapy s/p  Hemiparesis affecting L side as late effect of CVA  Pt reports having CVA in January 2014  Pt with initial symptoms of weakness on L side of body when in working environment  Pt was immediately taking to [address] [place] [address] with LOS x 1 month approximately and then was transferred to [facility] where Pt engaged in PT/OT services  Pt was then was D/Ankit to home environment and participated in PT/OT services at [facility]  Pt then transferred OT/PT services to [place] [address] Patient services  Pt reports having aide attend home environment every Monday for x 3 hours to complete laundry and mop/vacuum  Pt with history of x1 seizure in December 2014; no seizure activity since  Pt reports noticing regression of functional use of LUE since D/Ankit from last therapy services  Pt with increased tone affecting L AROM  Pt reports no RHS worn  Pt lives in a single floor apartment on 6th floor with elevator access with son  Pt mod I with all ADLs at this time  Pt required aide to assist with IADLs, though reports completing cooking, dishes, and garbage at mod I status  Pt worked as a  prior to CVA-- Pt loss role of worker  Pt on LT disability at this time  Pt continues the role of  with no difficulties reported        PMH:   Past Medical History:   Diagnosis Date    CAD (coronary artery disease) 01/15/2014    100% occlusion of right    [facility]A [facility]cere[facility] vascular accident) ([facility] ) 01/15/2014    Depressive disorder     Dyslipidemia     Erectile dysfunction 2009    Hemiparesis ([facility] ) 01/15/2014    left    Hypertension     Seizure ([facility] ) 2014         Pain Levels:     Restin    With Activity:  0    Objective     Functional Assessment     Comments  See impairment section for further details  Impairment Observations:  1  Decreased functional use of LUE  Baker Spinner Pt with self-report of increased functional use of RUE  Pt reports increased abilities to raise arm actively and unassistively to don deodrant  2  Increased tone    Improvement evident  3  Max difficulties terminating flexors on command    remains  4  1/2 thumb size sublux L shoulder  5  Decreased muscle endurance throughout LUE    Significant improvement  6  Exertional L tremors    Significant improvement evident  Dynamometer Position #2:   R: 65  75  L: 15  20  Pt is L hand dominant    Action:  3 point pinch: R 15 and L 5 5  Baker Spinner Baker Spinner R 17, L 1  2 point pinch: R 11 and L unable 2* Max difficulties terminating flexors on command    R 11, L 1  Lateral pinch: R 16 5 and L 10    R 20, L 8    9-hole Peg Test: Unable to perform 2* L hemiparesis  Baker Spinner remains      L AROM:  Shoulder elevation: full  Shoulder FF: near 1/4 with short lever and exertional tremors    1/4 with longer lever and reduced exertional tremors evident  Shoulder ABD: near 1/2 with short lever    1/4 with longer lever and exertional tremors evident  ER: 1/2    Near 3/4  IR: 3/4    full  Elbow ext: full  Elbow flex: near full    remains  Sup: near 3/4 with flexed digits    remains  Pron: full  Wrist flex: neutral  remains  Wrist ext: near 1/4    remains  Composite: 3/4 with thumb adducted    Near full with thumb adducted  Opposition: unable to perform 2* Max difficulties with terminating flexors on command    remains  Finger to nose: unable to peform    remains  Dysdiadochokinesia: unable to perform    remains    SUPINE L AROM:  Shoulder FF: 1/2 with long lever    3/4 with longer   Shoulder ABD: 1/4 with long lever    1/2 with long lever  Elbow ext: full  Elbow flex: 3/4    full  Sup: neutral  1/2  Pron: full  Tricep AG: 3/4 with difficulties terminating flexors on command 2* tone    Full with significant improved abilities to terminate flexors on command  Horizontal ABD: full  Horizontal ADD: near full    remains    1/2 thumb size sublux in L shoulder    remains  Symmetrical upright seated posture B/L shoulders     Pt demo with G functional progression towards goals in POC  Pt with improvement L AROM, improve abilities to terminate flexors on command, increased muscle endurance, and improved overall functional use of RUE  OTR recommending Pt to continue skilled OT services 2x/week for 8 weeks to continue to focus on abilities to terminate flexors on command, increased muscle endurance, forward functional reach, open/close grasp functions to improve overall functional use of LUE  Assessment    Assessment details: See skilled analysis for further details  Skilled Analysis:  Pt is a 66 y o  male referred to Occupational Therapy s/p hemiparesis affecting L side as late effect of CVA  Pt presents with decreased functional use of LUE, Max difficulties terminating flexors on command affecting normal movement patterns, increased tone affecting L AROM, 1/2 thumb size sublux in L shoulder, exertional L tremors, and decreased L muscle endurance affecting engagement in meaningful occupations  Pt will benefit from skilled Occupational Therapy services 2x/week for 12 weeks with focus on neuro re-ed, RHS fabrication, manual tx, BIONESS (if cleared by neurologist--awaiting for clearance), and self-care management to increase functional use of LUE and for overall improved QOL       Short Term Goals:  - Pt will demo with decreased LUE hypertonicity for improved grasp release, termination of flexors on command  50% of time 4 weeks--- NOT MET  - Pt will increase LUE to functional assist with <20% cuing for tabletop tasks and engagement in salient tasks 4 weeks-- PARTIALLY MET  - Pt will tolerate BIONESS for improved motor and sensory performance for overall improved hand to target with 80% accuracy 4 weeks-- PARTIALLY MET  - Pt will demo good carryover of clinic and home tone reduction strategies for improved L AROM initiation with functional reach, dressing, hygiene 4 weeks-- MET  - Pt will increase compliance with  RHS or C-Roll for improved LPS with  improved fit/decreased discomfort with wear time 4 weeks-- NOT MET  - Pt will demo with G carryover of Home Exercise Program to improve functional progression towards goals in Plan of care and for improved functional use of LUE 4 weeks-- MET        Long Term Goals:  - Pt will tolerate RHS or C-Roll x 6-8 hours for tendon elongation and  decreased tone in left hand-- NOT MET  - Pt will demo with decreased hypertonicity of  L  UE to WNL for automatic grasp/ release  and functional reach with grooming-- NOT MET  - Pt will demo with decreased exertional tremors with functional reach for normalized movement pattern of  L  UE-- PARTIALLY MET  - Pt will resume  L  hand dominance to refined functional assist with all activities of daily living and engagement in salient tasks-- NOT MET  - Pt will demo with decreased hypertonicity of LUE to WNL for improved alternate motor patterns, termination on command for improved dressing/hygiene and engagement in salient tasks-- NOT MET        OTHER PLANNED THERAPY INTERVENTIONS:   Supine, seated, and in stance neuro re-ed  Tricep AG  BIONESS (received clearance by neurologist)  Manual tx  Seated functional reach: crossing midline  Supine place and hold  WBearing strategies  Approximate pad pinch         INTERVENTION COMMENTS:  Diagnosis: I69 354  Precautions: CAD, Depression, Seizure (x1 occurring in December 2014), HTN  FOTO: 60, with 90% limitation in Hand function and 37% limitation in UE function    60, with 54% limitation in UE function and 65% limitation in Hand function  Insurance: Payor: YENNI MUHAMMAD REP / Plan: Soni JOLLY  REP / Product Type: Medicare PPO /   9 of ____ visits, PN due 10/17/2018

## 2018-09-20 ENCOUNTER — OFFICE VISIT (OUTPATIENT)
Dept: OCCUPATIONAL THERAPY | Facility: REHABILITATION | Age: 58
End: 2018-09-20
Payer: COMMERCIAL

## 2018-09-20 DIAGNOSIS — I69.354 HEMIPARESIS AFFECTING LEFT SIDE AS LATE EFFECT OF CEREBROVASCULAR ACCIDENT (HCC): Primary | ICD-10-CM

## 2018-09-20 PROCEDURE — 97140 MANUAL THERAPY 1/> REGIONS: CPT

## 2018-09-20 PROCEDURE — 97112 NEUROMUSCULAR REEDUCATION: CPT

## 2018-09-24 ENCOUNTER — OFFICE VISIT (OUTPATIENT)
Dept: OCCUPATIONAL THERAPY | Facility: REHABILITATION | Age: 58
End: 2018-09-24
Payer: COMMERCIAL

## 2018-09-24 DIAGNOSIS — I69.354 HEMIPARESIS AFFECTING LEFT SIDE AS LATE EFFECT OF CEREBROVASCULAR ACCIDENT (HCC): Primary | ICD-10-CM

## 2018-09-24 PROCEDURE — 97112 NEUROMUSCULAR REEDUCATION: CPT | Performed by: OCCUPATIONAL THERAPIST

## 2018-09-24 NOTE — PROGRESS NOTES
Daily Note     Today's date: 2018  Patient name: Daquan Nagy  : 1960  MRN: [id number]  Referring provider: Moon Vail MD  Dx: No diagnosis found  Subjective: "I am a little tired today  I didn't sleep too well last night"  Objective: See treatment description below    Pt arrived with increased fatigue levels on this date  Supine neuro re-ed with BIONESS donned on neuro mod setting x 20 minutes  Pt performed 2 sets x 10 reps of motor combinations (ceiling punch up with beginning position in shoulder ABD) and (elbow flexion/extension, ceiling punch up, and shoulder FF)  Pt then transitioned to supine neuro re-ed without BIONESS donned-- Pt performed 2 sets of 10 reps of Tricep AG  Assessment: Tolerated treatment fair  Patient would benefit from continued OT     Pt with increased fatigue levels on this date resulting in decreased muscle endurance evident with supine neuro re-ed  Pt with poor muscle endurance with tricep AG movements with exertional tremors in mid range evident  Pt able to control towards end range movements  Plan: Continue per plan of care  INTERVENTION COMMENTS:  Diagnosis: I69 354  Precautions: CAD, Depression, Seizure (x1 occurring in 2014), HTN  FOTO: 60, with 90% limitation in Hand function and 37% limitation in UE function    60, with 54% limitation in UE function and 65% limitation in Hand function  Insurance: Payor: [employer]  REP / Plan: Car JOLLY [facility] REP / Product Type: Medicare PPO /   11 of ____ visits, PN due 10/17/2018

## 2018-09-27 ENCOUNTER — OFFICE VISIT (OUTPATIENT)
Dept: OCCUPATIONAL THERAPY | Facility: REHABILITATION | Age: 58
End: 2018-09-27
Payer: COMMERCIAL

## 2018-09-27 DIAGNOSIS — I69.354 HEMIPARESIS AFFECTING LEFT SIDE AS LATE EFFECT OF CEREBROVASCULAR ACCIDENT (HCC): Primary | ICD-10-CM

## 2018-09-27 PROCEDURE — 97150 GROUP THERAPEUTIC PROCEDURES: CPT

## 2018-09-27 PROCEDURE — 97112 NEUROMUSCULAR REEDUCATION: CPT

## 2018-09-27 NOTE — PROGRESS NOTES
Daily Note     Today's date: 2018  Patient name: Cherrie Christine  : 1960  MRN: [id number]  Referring provider: Jess Dave MD  Dx:   Encounter Diagnosis   Name Primary?  Hemiparesis affecting left side as late effect of cerebrovascular accident ([facility] [address] ) Yes                  Subjective: "Look how loose my hand is today"  Objective: See treatment diary below  Pt participated in skilled OT focusing on tone reduction, strengthening and function movement through [facility] and neuro re-ed in supine and quadriped  Assessment: Tolerated treatment well  WB into LUE in quadriped for about 15 min w/improvement noted in digit extension  Supine on mat, HR add/abd 2x10, improvement noted to near full extension when in HR abduction, ceiling punch ups w/ G shoulder protraction demo-15x's  Pt tolerated BIONESS for 10 min followed by UEB for about 5 min prograde, max difficulty retrograde due to decreased   Patient would benefit from continued OT      Plan: Continued skilled OT per POC     INTERVENTION COMMENTS:  Diagnosis: I69 354  Precautions: CAD, Depression, Seizure (x1 occurring in 2014), HTN  FOTO: 60, with 90% limitation in Hand function and 37% limitation in UE function    60, with 54% limitation in UE function and 65% limitation in Hand function  Insurance: Payor: YENNI MUHAMMAD REP / Plan: Pearle Drown [employer] [facility] REP / Product Type: Medicare PPO /   11 of ____ visits, PN due 10/17/2018    9/27:  [id number] neuro  [id number]  [id number]:1 Neuro

## 2018-10-01 ENCOUNTER — APPOINTMENT (OUTPATIENT)
Dept: OCCUPATIONAL THERAPY | Facility: REHABILITATION | Age: 58
End: 2018-10-01
Payer: COMMERCIAL

## 2018-10-03 ENCOUNTER — TELEPHONE (OUTPATIENT)
Dept: FAMILY MEDICINE CLINIC | Facility: CLINIC | Age: 58
End: 2018-10-03

## 2018-10-03 ENCOUNTER — EVALUATION (OUTPATIENT)
Dept: OCCUPATIONAL THERAPY | Facility: REHABILITATION | Age: 58
End: 2018-10-03
Payer: COMMERCIAL

## 2018-10-03 DIAGNOSIS — I69.354 HEMIPARESIS AFFECTING LEFT SIDE AS LATE EFFECT OF CEREBROVASCULAR ACCIDENT (HCC): Primary | ICD-10-CM

## 2018-10-03 PROCEDURE — G8990 OTHER PT/OT CURRENT STATUS: HCPCS | Performed by: OCCUPATIONAL THERAPIST

## 2018-10-03 PROCEDURE — G8991 OTHER PT/OT GOAL STATUS: HCPCS | Performed by: OCCUPATIONAL THERAPIST

## 2018-10-03 PROCEDURE — 97112 NEUROMUSCULAR REEDUCATION: CPT | Performed by: OCCUPATIONAL THERAPIST

## 2018-10-03 NOTE — PROGRESS NOTES
OCCUPATIONAL THERAPY RE- EVALUATION:    10/03/2018  Isaura Reyna  1960  [id number]  Jose Cobos MD  No diagnosis found  Subjective Evaluation    Quality of life: good          "I am still improving  Without a doubt, there is no doubt in my mind"  PATIENT GOAL: "I just want to improve the use of my hand  I want to be able to keep my hand opened more"    HISTORY OF PRESENT ILLNESS:     Pt is a 66 y o  male who was referred to Occupational Therapy s/p  Hemiparesis affecting L side as late effect of CVA  Pt reports having CVA in January 2014  Pt with initial symptoms of weakness on L side of body when in working environment  Pt was immediately taking to [address] [place] [address] with LOS x 1 month approximately and then was transferred to [facility] where Pt engaged in PT/OT services  Pt was then was D/Ankit to home environment and participated in PT/OT services at [facility]  Pt then transferred OT/PT services to [place] [address] Patient services  Pt reports having aide attend home environment every Monday for x 3 hours to complete laundry and mop/vacuum  Pt with history of x1 seizure in December 2014; no seizure activity since  Pt reports noticing regression of functional use of LUE since D/Ankit from last therapy services  Pt with increased tone affecting L AROM  Pt reports no RHS worn  Pt lives in a single floor apartment on 6th floor with elevator access with son  Pt mod I with all ADLs at this time  Pt required aide to assist with IADLs, though reports completing cooking, dishes, and garbage at mod I status  Pt worked as a  prior to CVA-- Pt loss role of worker  Pt on LT disability at this time  Pt continues the role of  with no difficulties reported        PMH:   Past Medical History:   Diagnosis Date    CAD (coronary artery disease) 01/15/2014    100% occlusion of right    CVA (cerebral vascular accident) ([facility] [address] ) 01/15/2014    Depressive disorder     Dyslipidemia     Erectile dysfunction 2009    Hemiparesis ([facility] [address] ) 01/15/2014    left    Hypertension     Seizure ([facility] [address] ) 2014         Pain Levels:     Restin    With Activity:  0    Objective     Functional Assessment     Comments  See impairment section for further details  Impairment Observations:  1  Decreased functional use of LUE  Jocelyn Blend Pt with self-report of increased functional use of RUE  Pt reports increased abilities to raise arm actively and unassistively to don deodrant  2  Increased tone    Improvement evident  3  Max difficulties terminating flexors on command    remains  4  1/2 thumb size sublux L shoulder  5  Decreased muscle endurance throughout LUE    Significant improvement  6  Exertional L tremors    Significant improvement evident  Dynamometer Position #2:   R: 65  75  75  L: 15  20  22  Pt is L hand dominant    Action:  3 point pinch: R 15 and L 5 5  Jocelyn Blend Jocelyn Blend R 17, L 1  R 19, L 1  2 point pinch: R 11 and L unable 2* Max difficulties terminating flexors on command    R 11, L 1  R 11, L 2  Lateral pinch: R 16 5 and L 10    R 20, L 8  R 18, L 10    9-hole Peg Test: Unable to perform 2* L hemiparesis  Jocelyn Blend remains      L AROM:  Shoulder elevation: full  Shoulder FF: near 1/4 with short lever and exertional tremors    1/4 with longer lever and reduced exertional tremors evident    Near 1/2 with short lever  Shoulder ABD: near 1/2 with short lever    1/4 with longer lever and exertional tremors evident    Near 1/2 with longer lever, though exertional tremors still evident  ER: 1/2    Near 3/4    Near full with exertional tremores  IR: 3/4    full  Elbow ext: full  Elbow flex: near full    full  Sup: near 3/4 with flexed digits    Remains, with increased digits to 1/2  Pron: full  Wrist flex: neutral  remains  Wrist ext: near 1/4    remains  Composite: 3/4 with thumb adducted    Near full with thumb adducted     Near full  Opposition: unable to perform 2* Max difficulties with terminating flexors on command    remains  Finger to nose: unable to peform    remains  Dysdiadochokinesia: unable to perform    remains    SUPINE L AROM:  Shoulder FF: 1/2 with long lever    3/4 with longer    remains  Shoulder ABD: 1/4 with long lever    1/2 with long lever    Near 1/2 with long lever  Elbow ext: full  Elbow flex: 3/4    full  Sup: neutral  1/2    remains  Pron: full  Tricep AG: 3/4 with difficulties terminating flexors on command 2* tone    Full with significant improved abilities to terminate flexors on command    Remains with improvement evident  Horizontal ABD: full  Horizontal ADD: near full    remains    1/2 thumb size sublux in L shoulder    remains  Symmetrical upright seated posture B/L shoulders     Pt demo with G functional progression towards goals in POC  Pt with improvement L AROM, improve abilities to terminate flexors on command, increased muscle endurance, and improved overall functional use of RUE  OTR recommending Pt to continue skilled OT services 2x/week for 4-6 weeks to continue to focus on abilities to terminate flexors on command, increased muscle endurance, forward functional reach, open/close grasp functions to improve overall functional use of LUE  Assessment    Assessment details: See skilled analysis for further details  Skilled Analysis:  Pt is a 66 y o  male referred to Occupational Therapy s/p hemiparesis affecting L side as late effect of CVA  Pt presents with decreased functional use of LUE, Max difficulties terminating flexors on command affecting normal movement patterns, increased tone affecting L AROM, 1/2 thumb size sublux in L shoulder, exertional L tremors, and decreased L muscle endurance affecting engagement in meaningful occupations    Pt will benefit from skilled Occupational Therapy services 2x/week for 12 weeks with focus on neuro re-ed, RHS fabrication, manual tx, BIONESS (if cleared by neurologist--awaiting for clearance), and self-care management to increase functional use of LUE and for overall improved QOL       Short Term Goals:  - Pt will demo with decreased LUE hypertonicity for improved grasp release, termination of flexors on command  50% of time 4 weeks--- PARTIALLY MET  - Pt will increase LUE to functional assist with <20% cuing for tabletop tasks and engagement in salient tasks 4 weeks-- PARTIALLY MET  - Pt will tolerate BIONESS for improved motor and sensory performance for overall improved hand to target with 80% accuracy 4 weeks-- PARTIALLY MET  - Pt will demo good carryover of clinic and home tone reduction strategies for improved L AROM initiation with functional reach, dressing, hygiene 4 weeks-- MET  - Pt will increase compliance with  RHS or C-Roll for improved LPS with  improved fit/decreased discomfort with wear time 4 weeks-- NOT MET  - Pt will demo with G carryover of Home Exercise Program to improve functional progression towards goals in Plan of care and for improved functional use of LUE 4 weeks-- MET        Long Term Goals:  - Pt will tolerate RHS or C-Roll x 6-8 hours for tendon elongation and  decreased tone in left hand-- NOT MET  - Pt will demo with decreased hypertonicity of  L  UE to WNL for automatic grasp/ release  and functional reach with grooming-- NOT MET  - Pt will demo with decreased exertional tremors with functional reach for normalized movement pattern of  L  UE-- PARTIALLY MET  - Pt will resume  L  hand dominance to refined functional assist with all activities of daily living and engagement in salient tasks-- NOT MET  - Pt will demo with decreased hypertonicity of LUE to WNL for improved alternate motor patterns, termination on command for improved dressing/hygiene and engagement in salient tasks-- NOT MET        OTHER PLANNED THERAPY INTERVENTIONS:   Supine, seated, and in stance neuro re-ed  Tricep   [facility] by [facility])  Manual tx  Seated functional reach: crossing midline  Supine place and hold  WBearing strategies  Approximate pad pinch         INTERVENTION COMMENTS:  Diagnosis: I69 354  Precautions: CAD, Depression, Seizure (x1 occurring in December 2014), HTN  FOTO: 60, with 90% limitation in Hand function and 37% limitation in UE function    60, with 54% limitation in UE function and 65% limitation in Hand function    70, with 54% limitation in UE function and 65% limitation in Hand function  Insurance: Payor: YENNI MUHAMMAD REP / Plan: Sheridan JOLLY  REP / Product Type: Medicare PPO /   15 of ____ visits, PN due 11/03/2018    [id number] 1:1  [id number] unsupervised

## 2018-10-08 ENCOUNTER — OFFICE VISIT (OUTPATIENT)
Dept: OCCUPATIONAL THERAPY | Facility: REHABILITATION | Age: 58
End: 2018-10-08
Payer: COMMERCIAL

## 2018-10-08 DIAGNOSIS — I69.354 HEMIPARESIS AFFECTING LEFT SIDE AS LATE EFFECT OF CEREBROVASCULAR ACCIDENT (HCC): Primary | ICD-10-CM

## 2018-10-08 PROCEDURE — 97112 NEUROMUSCULAR REEDUCATION: CPT | Performed by: OCCUPATIONAL THERAPIST

## 2018-10-08 NOTE — PROGRESS NOTES
Daily Note     Today's date: 10/8/2018  Patient name: Tito Goodwin  : 1960  MRN: [id number]  Referring provider: Blanca Coles MD  Dx: No diagnosis found  Subjective: "That was an awesome workout"  Objective: See treatment description below    Supine neuro re-ed with BIONESS donned on neuro mod setting x 20 minutes-- Pt performed 2 sets x 10 reps and 1 set x 5 reps of shoulder FF with focus on improve muscle endurance and motor control  WBearing to LUE in qudruped with focus on improve stabilization, proprioceptive input, and tone reduction in wrist and digits  Assessment: Tolerated treatment well  Patient would benefit from continued OT     Pt performed neuro re-ed with significant improve eccentric/concentric control with reduced exertional tremors evident  Pt with high fatigue levels towards last set  Pt tolerated WBearing activity well on this date with one rest break required  Pt with significant reduced tone and edema in L hand after WBearing strategies  Pt attempted to perform approximated pad pinch after WBearing activity, though still demo with MAx difficulties terminating flexors on command-- OTR recommending Pt to continue open/close grasp functions in home environment to improve relaxation response and automaticity  Plan: Continue per plan of care  INTERVENTION COMMENTS:  Diagnosis: I69 354  Precautions: CAD, Depression, Seizure (x1 occurring in 2014), HTN  FOTO: 60, with 90% limitation in Hand function and 37% limitation in UE function    60, with 54% limitation in UE function and 65% limitation in Hand function    70, with 54% limitation in UE function and 65% limitation in Hand function  Insurance: Payor: YENNI  REP / Plan: Docia Milks PFFS  REP / Product Type: Medicare PPO /   15 of ____ visits, PN due 2018

## 2018-10-11 ENCOUNTER — APPOINTMENT (OUTPATIENT)
Dept: OCCUPATIONAL THERAPY | Facility: REHABILITATION | Age: 58
End: 2018-10-11
Payer: COMMERCIAL

## 2018-10-17 ENCOUNTER — OFFICE VISIT (OUTPATIENT)
Dept: OCCUPATIONAL THERAPY | Facility: REHABILITATION | Age: 58
End: 2018-10-17
Payer: COMMERCIAL

## 2018-10-17 DIAGNOSIS — I69.354 HEMIPARESIS AFFECTING LEFT SIDE AS LATE EFFECT OF CEREBROVASCULAR ACCIDENT (HCC): Primary | ICD-10-CM

## 2018-10-17 PROCEDURE — 97530 THERAPEUTIC ACTIVITIES: CPT | Performed by: OCCUPATIONAL THERAPIST

## 2018-10-17 PROCEDURE — 97112 NEUROMUSCULAR REEDUCATION: CPT | Performed by: OCCUPATIONAL THERAPIST

## 2018-10-17 NOTE — PROGRESS NOTES
Daily Note     Today's date: 10/17/2018  Patient name: Isaura Reyna  : 1960  MRN: [id number]  Referring provider: Jose Cobos MD  Dx: No diagnosis found  Subjective: "You should have seen my arm this morning  It was moving all over the place"  Objective: See treatment description below    Supine neuro re-ed with BIONESS donned on neuro mod setting x 20 minutes-- Pt performed 3 sets x 10 reps shoulder FF with focus on improved muscle endurance and motor control  UBE x 3 minutes prograde and retrograde in seated position with BUE with focus on improved muscle endurance and symmetrical movements  Assessment: Tolerated treatment well  Patient would benefit from continued OT    Pt demo overall increased muscle endurance on this date with abilities to perform increased reps of supine neuro re-ed with improved endurance and motor control evident  Pt able to sustained gross grasp on UBE x 4 minutes though required coban for remainder two minutes 2* loss of gross grasp  Pt will continue to benefit from OT with focus on open/close grasp functions, proximal strength, and improved L muscle endurance  OTR provided Pt with C-roll for improved LPS and prevention of tendon shortening  OTR educated Pt on the proper wear schedule beginning with two hours for 3-4 days and increasing to 4 hours if no redness or irritation is evident  OTR will reassess if Pt is able to advance to 6-8 hours/night next week  Plan: Continue per plan of care  INTERVENTION COMMENTS:  Diagnosis: I69 354  Precautions: CAD, Depression, Seizure (x1 occurring in 2014), HTN  FOTO: 60, with 90% limitation in Hand function and 37% limitation in UE function    60, with 54% limitation in UE function and 65% limitation in Hand function    70, with 54% limitation in UE function and 65% limitation in Hand function  Insurance: Payor: [employer] REP / Plan: Trevor Ann PFFS [facility] REP / Product Type: Medicare PPO /   15 of ____ visits, PN due 11/03/2018

## 2018-10-18 ENCOUNTER — OFFICE VISIT (OUTPATIENT)
Dept: OCCUPATIONAL THERAPY | Facility: REHABILITATION | Age: 58
End: 2018-10-18
Payer: COMMERCIAL

## 2018-10-18 DIAGNOSIS — I69.354 HEMIPARESIS AFFECTING LEFT SIDE AS LATE EFFECT OF CEREBROVASCULAR ACCIDENT (HCC): Primary | ICD-10-CM

## 2018-10-18 PROCEDURE — 97112 NEUROMUSCULAR REEDUCATION: CPT | Performed by: OCCUPATIONAL THERAPIST

## 2018-10-18 PROCEDURE — 97535 SELF CARE MNGMENT TRAINING: CPT | Performed by: OCCUPATIONAL THERAPIST

## 2018-10-18 NOTE — PROGRESS NOTES
Daily Note     Today's date: 10/18/2018  Patient name: Chet Moscoso  : 1960  MRN: [id number]  Referring provider: Jose Francisco MD  Dx: No diagnosis found  Subjective: "I just want to be able to open and close my hand"  Objective: See treatment description below    Supine neuro re-ed with BIONESS donned on neuro mod setting x 20 minutes-- Pt performed 3 sets x 10 reps shoulder FF with isometric pulses at 3/4 range with focus on improved muscle endurance and motor control  Seated functional reach crossing midline for block retrieval and placement on table with BIONESS donned on grap/release function x 10 minutes  Pt fatigued quickly with requirements to utilize RUE to support and stabilize with functional reach demands  Pt continues to demo with Max difficulties terminating flexors actively to open hand and extend digits  Assessment: Tolerated treatment well  Patient would benefit from continued OT      Plan: Continue per plan of care  INTERVENTION COMMENTS:  Diagnosis: I69 354  Precautions: CAD, Depression, Seizure (x1 occurring in 2014), HTN  FOTO: 60, with 90% limitation in Hand function and 37% limitation in UE function    60, with 54% limitation in UE function and 65% limitation in Hand function    70, with 54% limitation in UE function and 65% limitation in Hand function  Insurance: Payor: YENNI MUHAMMAD REP / Plan: Katie Mcdaniel W Óscar Edward REP / Product Type: Medicare PPO /   16 of ____ visits, PN due 2018

## 2018-10-22 ENCOUNTER — APPOINTMENT (OUTPATIENT)
Dept: OCCUPATIONAL THERAPY | Facility: REHABILITATION | Age: 58
End: 2018-10-22
Payer: COMMERCIAL

## 2018-10-25 ENCOUNTER — OFFICE VISIT (OUTPATIENT)
Dept: OCCUPATIONAL THERAPY | Facility: REHABILITATION | Age: 58
End: 2018-10-25
Payer: COMMERCIAL

## 2018-10-25 DIAGNOSIS — I69.354 HEMIPARESIS AFFECTING LEFT SIDE AS LATE EFFECT OF CEREBROVASCULAR ACCIDENT (HCC): Primary | ICD-10-CM

## 2018-10-25 PROCEDURE — 97535 SELF CARE MNGMENT TRAINING: CPT | Performed by: OCCUPATIONAL THERAPIST

## 2018-10-25 PROCEDURE — 97112 NEUROMUSCULAR REEDUCATION: CPT | Performed by: OCCUPATIONAL THERAPIST

## 2018-10-25 NOTE — PROGRESS NOTES
Daily Note     Today's date: 10/25/2018  Patient name: Lola Lambert  : 1960  MRN: [id number]  Referring provider: Anat Patel MD  Dx: No diagnosis found  Subjective: "My arm can actually reach on to the counter now"  Objective: See treatment description below    Supine neuro-red with BIONESS donned on neuro mod setting x 20 minutes-- Pt performed 2 sets of 10 reps of shoulder FF with isometric pulses at near full range and shoulder ABD  Saucedo Joe in seated position to RUE for improved wrist extension, digit extension, and improved proprioceptive feedback throughout RUE  Approximated Pad pinch with focus on improved automaticity with functional pinch/release patterns  Assessment: Tolerated treatment well  Patient would benefit from continued OT     Pt continues to present with increased muscle endurance with less frequent breaks required when performing supine neuro re-ed  Pt able to maintain elbow extension throughout ranges with abilities to achieve shoulder FF to near full and shoulder ABD to near 1/2 with G abilities to control exertional tremors  Pt continues to respond positively to R EVELINE  Donnelljocelynn, though demo with Max difficulties with terminating flexors on command  Minimal trace movement in D5 evident on this date with attempts to extend  Plan: Continue per plan of care  INTERVENTION COMMENTS:  Diagnosis: I69 354  Precautions: CAD, Depression, Seizure (x1 occurring in 2014), HTN  FOTO: 60, with 90% limitation in Hand function and 37% limitation in UE function    60, with 54% limitation in UE function and 65% limitation in Hand function    70, with 54% limitation in UE function and 65% limitation in Hand function  Insurance: Payor: YENNI MUHAMMAD REP / Plan: Kayla JOLLY  REP / Product Type: Medicare PPO /   17 of ____ visits, PN due 2018

## 2018-10-30 ENCOUNTER — OFFICE VISIT (OUTPATIENT)
Dept: OCCUPATIONAL THERAPY | Facility: REHABILITATION | Age: 58
End: 2018-10-30
Payer: COMMERCIAL

## 2018-10-30 DIAGNOSIS — I69.354 HEMIPARESIS AFFECTING LEFT SIDE AS LATE EFFECT OF CEREBROVASCULAR ACCIDENT (HCC): Primary | ICD-10-CM

## 2018-10-30 PROCEDURE — 97112 NEUROMUSCULAR REEDUCATION: CPT

## 2018-10-30 PROCEDURE — 97110 THERAPEUTIC EXERCISES: CPT

## 2018-10-30 NOTE — PROGRESS NOTES
Daily Note     Today's date: 10/30/2018  Patient name: Chet Moscoso  : 1960  MRN: [id number]  Referring provider: Jose Francisco MD  Dx:   Encounter Diagnosis     ICD-10-CM    1  Hemiparesis affecting left side as late effect of cerebrovascular accident [facility]) [id number]                   Subjective: "Look how high I'm going, soon I can decorate the brigette tree "      Objective: See treatment diary below  Pt participated in skilled OT focusing on neuro blessing, ROM and grasp/release of LUE  NEMS and MH for 10 min to L trap and scap for neuro blessing  Bioness to L hand for 10 min neuro blessing, grasp/release for 5 min utilizing cones to stack to facilitate release of hand for functional mvmt  UE ranger 3x10 for shoulder ABD/ADD, retraction/protraction, circumduction      Assessment: Tolerated treatment well  Improvement noted w/ mass practice of grasp/release function  Improvement noted in shoulder ROM for fwd flex to >90 w/ stabilization of shoulder  Patient would benefit from continued OT      Plan: Continue per plan of care  INTERVENTION COMMENTS:  Diagnosis: I69 354  Precautions: CAD, Depression, Seizure (x1 occurring in 2014), HTN  FOTO: 60, with 90% limitation in Hand function and 37% limitation in UE function    60, with 54% limitation in UE function and 65% limitation in Hand function    70, with 54% limitation in UE function and 65% limitation in Hand function  Insurance: Payor: YENNI MUHAMMAD REP / Plan: Katie Whitlock PFFS  W Óscar Edward REP / Product Type: Medicare PPO /   18 of ____ visits, PN due 2018    Pt seen by Deejay HAYS under direct supervision of Jeanie SNELL/ANDRZEJ

## 2018-10-31 ENCOUNTER — APPOINTMENT (OUTPATIENT)
Dept: OCCUPATIONAL THERAPY | Facility: REHABILITATION | Age: 58
End: 2018-10-31
Payer: COMMERCIAL

## 2018-11-01 ENCOUNTER — OFFICE VISIT (OUTPATIENT)
Dept: OCCUPATIONAL THERAPY | Facility: REHABILITATION | Age: 58
End: 2018-11-01
Payer: COMMERCIAL

## 2018-11-01 DIAGNOSIS — I69.354 HEMIPARESIS AFFECTING LEFT SIDE AS LATE EFFECT OF CEREBROVASCULAR ACCIDENT (HCC): Primary | ICD-10-CM

## 2018-11-01 PROCEDURE — 97112 NEUROMUSCULAR REEDUCATION: CPT | Performed by: OCCUPATIONAL THERAPIST

## 2018-11-01 PROCEDURE — G8991 OTHER PT/OT GOAL STATUS: HCPCS | Performed by: OCCUPATIONAL THERAPIST

## 2018-11-01 PROCEDURE — G8990 OTHER PT/OT CURRENT STATUS: HCPCS | Performed by: OCCUPATIONAL THERAPIST

## 2018-11-01 NOTE — PROGRESS NOTES
Daily Note     Today's date: 2018  Patient name: Wanda Santacruz  : 1960  MRN: [id number]  Referring provider: Fredy Montalvo MD  Dx: No diagnosis found  Subjective: "This is a really good one"  Objective: See treatment description below    Pt required to leave 15 minutes early to  son  Seated neuro re-ed with BIONESS donned x 20 minutes of neuro mod setting and 15 minutes on open/close grasp functions  Pt performed 2 sets x 10 reps of shoulder FF to 90* and shoulder ABD/ADD over bolster  Seated supination and pronation with #2 dumbbell-- Pt performed 3 sets x 10 reps  Assessment: Tolerated treatment well  Patient would benefit from continued OT     Pt tolerated tx session overall well with sigificant improved muscle endurance evident allowing for improved abilities to perform seated neuro re-ed exercises  Pt required encouragement and verbal cues to refrain from shoulder hiking with shoulder FF-- Pt responded well to cueing system with improve normal movement patterns evident  Pt able to clear bolster well with massed practice  Pt with x1 loss control of dumbbell x1 2* distractions evident in working environment  OTR with plans to engage Pt in functional exercises to enhance supination/pronation abilities next session  Plan: Continue per plan of care  INTERVENTION COMMENTS:  Diagnosis: I69 354  Precautions: CAD, Depression, Seizure (x1 occurring in 2014), HTN  FOTO: 60, with 90% limitation in Hand function and 37% limitation in UE function    60, with 54% limitation in UE function and 65% limitation in Hand function    70, with 54% limitation in UE function and 65% limitation in Hand function  Insurance: Payor: YENNI MUHAMMAD REP / Plan: Beth JOLLY  REP / Product Type: Medicare PPO /   19 of ____ visits, PN due 2018

## 2018-11-06 ENCOUNTER — OFFICE VISIT (OUTPATIENT)
Dept: OCCUPATIONAL THERAPY | Facility: REHABILITATION | Age: 58
End: 2018-11-06
Payer: COMMERCIAL

## 2018-11-06 DIAGNOSIS — I69.354 HEMIPARESIS AFFECTING LEFT SIDE AS LATE EFFECT OF CEREBROVASCULAR ACCIDENT (HCC): Primary | ICD-10-CM

## 2018-11-06 PROCEDURE — 97112 NEUROMUSCULAR REEDUCATION: CPT

## 2018-11-06 PROCEDURE — 97140 MANUAL THERAPY 1/> REGIONS: CPT

## 2018-11-06 NOTE — PROGRESS NOTES
Daily Note     Today's date: 2018  Patient name: Alicia Kumar  : 1960  MRN: [id number]  Referring provider: Dahlia Baxter MD  Dx:   Encounter Diagnosis   Name Primary?  Hemiparesis affecting left side as late effect of cerebrovascular accident ([facility] [address] ) Yes                  Subjective: "I'm moving into a townhouse, it will be more like my own house"  Objective: See treatment diary below  Pt participated in skilled OT focusing on L shoulder strengthening, functional grasp/release, neuro re-ed using BIONESS and NMES  Pt engaged in BB transfer in HR plane with BIONESS on grasp/release function for BB transfer with mod cues to stabilize trunk and utilize shoulder for movement  Assessment: Tolerated treatment well  Pt presented with L hand edema and increased tone this session  Improvement noted in open hand function with mass practice donning BIONESS and hand over hand assist by therapist to assist with digit extension to grasp BB  Patient would benefit from continued OT      Plan: Continued skilled OT per POC focusing on grasp/release and UE strengthening to facilitate functional movement of LUE  INTERVENTION COMMENTS:  Diagnosis: I69 354  Precautions: CAD, Depression, Seizure (x1 occurring in 2014), HTN  FOTO: 60, with 90% limitation in Hand function and 37% limitation in UE function    60, with 54% limitation in UE function and 65% limitation in Hand function    70, with 54% limitation in UE function and 65% limitation in Hand function  Insurance: Payor: YENNI  REP / Plan: Zackary JOLLY  REP / Product Type: Medicare PPO /   19 of ____ visits, PN due 2018

## 2018-11-08 ENCOUNTER — APPOINTMENT (OUTPATIENT)
Dept: OCCUPATIONAL THERAPY | Facility: REHABILITATION | Age: 58
End: 2018-11-08
Payer: COMMERCIAL

## 2018-11-11 PROBLEM — E11.9 DIABETES MELLITUS WITHOUT COMPLICATION (HCC): Status: ACTIVE | Noted: 2017-02-22

## 2018-11-12 ENCOUNTER — OFFICE VISIT (OUTPATIENT)
Dept: FAMILY MEDICINE CLINIC | Facility: CLINIC | Age: 58
End: 2018-11-12
Payer: COMMERCIAL

## 2018-11-12 VITALS
DIASTOLIC BLOOD PRESSURE: 84 MMHG | BODY MASS INDEX: 29.04 KG/M2 | HEIGHT: 68 IN | OXYGEN SATURATION: 98 % | WEIGHT: 191.6 LBS | TEMPERATURE: 97.3 F | SYSTOLIC BLOOD PRESSURE: 126 MMHG | HEART RATE: 75 BPM | RESPIRATION RATE: 18 BRPM

## 2018-11-12 DIAGNOSIS — N40.0 BENIGN PROSTATIC HYPERPLASIA WITHOUT LOWER URINARY TRACT SYMPTOMS: ICD-10-CM

## 2018-11-12 DIAGNOSIS — I10 BENIGN ESSENTIAL HYPERTENSION: ICD-10-CM

## 2018-11-12 DIAGNOSIS — E66.3 OVERWEIGHT: ICD-10-CM

## 2018-11-12 DIAGNOSIS — F41.9 ANXIETY: ICD-10-CM

## 2018-11-12 DIAGNOSIS — G40.89 OTHER SEIZURES (HCC): ICD-10-CM

## 2018-11-12 DIAGNOSIS — Z72.0 TOBACCO ABUSE: ICD-10-CM

## 2018-11-12 DIAGNOSIS — F32.A DEPRESSIVE DISORDER: ICD-10-CM

## 2018-11-12 DIAGNOSIS — E78.49 OTHER HYPERLIPIDEMIA: ICD-10-CM

## 2018-11-12 DIAGNOSIS — E11.9 DIABETES MELLITUS WITHOUT COMPLICATION (HCC): ICD-10-CM

## 2018-11-12 DIAGNOSIS — F41.1 GENERALIZED ANXIETY DISORDER: ICD-10-CM

## 2018-11-12 DIAGNOSIS — I69.354 HEMIPARESIS AFFECTING LEFT SIDE AS LATE EFFECT OF CEREBROVASCULAR ACCIDENT (HCC): Primary | ICD-10-CM

## 2018-11-12 DIAGNOSIS — Z11.59 ENCOUNTER FOR SCREENING FOR OTHER VIRAL DISEASES: ICD-10-CM

## 2018-11-12 DIAGNOSIS — Z23 NEED FOR VACCINATION: ICD-10-CM

## 2018-11-12 PROCEDURE — 3074F SYST BP LT 130 MM HG: CPT | Performed by: FAMILY MEDICINE

## 2018-11-12 PROCEDURE — 90471 IMMUNIZATION ADMIN: CPT

## 2018-11-12 PROCEDURE — 3008F BODY MASS INDEX DOCD: CPT | Performed by: FAMILY MEDICINE

## 2018-11-12 PROCEDURE — 3079F DIAST BP 80-89 MM HG: CPT | Performed by: FAMILY MEDICINE

## 2018-11-12 PROCEDURE — 90682 RIV4 VACC RECOMBINANT DNA IM: CPT

## 2018-11-12 PROCEDURE — 99214 OFFICE O/P EST MOD 30 MIN: CPT | Performed by: FAMILY MEDICINE

## 2018-11-12 RX ORDER — NICOTINE 21 MG/24HR
1 PATCH, TRANSDERMAL 24 HOURS TRANSDERMAL EVERY 24 HOURS
Qty: 28 PATCH | Refills: 1 | Status: SHIPPED | OUTPATIENT
Start: 2018-11-12 | End: 2019-03-22 | Stop reason: ALTCHOICE

## 2018-11-12 RX ORDER — LORAZEPAM 1 MG/1
1 TABLET ORAL EVERY 6 HOURS PRN
Qty: 30 TABLET | Refills: 0 | Status: SHIPPED | OUTPATIENT
Start: 2018-11-12 | End: 2019-03-22 | Stop reason: SDUPTHER

## 2018-11-12 RX ORDER — ATORVASTATIN CALCIUM 20 MG/1
20 TABLET, FILM COATED ORAL DAILY
Qty: 90 TABLET | Refills: 3 | Status: SHIPPED | OUTPATIENT
Start: 2018-11-12 | End: 2019-11-08 | Stop reason: SDUPTHER

## 2018-11-12 NOTE — ASSESSMENT & PLAN NOTE
Lab Results   Component Value Date    HGBA1C 5 9 (H) 04/29/2017   Labs reviewed from 7/24/18  Blood Sugar Average: Last 72 hrs: occasional HBSM  Before supper = 100-120

## 2018-11-12 NOTE — ASSESSMENT & PLAN NOTE
No seizures, on Keppra  Notes from [facility] visit from 7/24/18, reviewed  Labs also reviewed from 7/24/18  No change in meds

## 2018-11-12 NOTE — PROGRESS NOTES
Assessment/Plan:    Hemiparesis affecting left side as late effect of cerebrovascular accident ([facility] [address] )  Stable situation  Notes reviewed from [facility] - 7/24/18  Continues with PT, twice a week for LUE  History of cerebrovascular accident with residual deficit  Stable on present meds  Notes reviewed from [facility] visit on 7/24/18  Other seizures ([facility] [address] )  No seizures, on Keppra  Notes from [facility] visit from 7/24/18, reviewed  Labs also reviewed from 7/24/18  No change in meds  Overweight  BMI = 29 1  Has gained 3# since 7/24/18  Walks only occasionally  Patient is educated on the importance of portion control and dieting  Patient is also educated on the importance of exercise  Patient is encouraged to walk 30 min at least 5 times a week  Discussed about benefits of losing weight  Patient acknowledges that they understood what is discussed  Benign essential hypertension  HBPM - none  Patient's blood pressure is controlled  Patient denies any side effects with medications  Patient educated on the importance of weight loss, and appropriate dieting  Patient admits to be compliant with medications  Labs reviewed from 7/24/18  No change in present therapy  Avoidance of salt  Tobacco abuse  Smoking 1/2 PPD  Wants to quit  Counseled patient on the importance of smoking cessation  Patient acknowledges an understanding of the risks of smoking  At the very least, patient is agreeable to try cutting down  Discussed outside services to help with quitting smoking  Discussed the specifics of using nicotine patches, on a tapering schedule,  after stopping smoking  Tapering can last 6 months  While on the nicotine patches, I instructed on the use of PRN nicotine gum, instead of reaching for a cigarette, in a moment of weakness  Generalized anxiety disorder  His 5 yo son, lives with him, and they have just moved  Doing well on there sertraline      Diabetes mellitus without complication ([facility] [address] )  Lab Results   Component Value Date    HGBA1C 5 9 (H) 04/29/2017   Labs reviewed from 7/24/18  Blood Sugar Average: Last 72 hrs: occasional HBSM  Before supper = 100-120  Depressive disorder  No significant depressed moods and sleeps well at night  Continue sertraline at 100mg PO daily  Diagnoses and all orders for this visit:    Hemiparesis affecting left side as late effect of cerebrovascular accident [facility])    Need for vaccination  -     influenza vaccine, [id number], quadrivalent, recombinant, PF, 0 5 mL, for patients 18 yr+ (FLUBLOK)    Overweight    Other seizures ([address] )    Other hyperlipidemia  -     atorvastatin (LIPITOR) 20 mg tablet; Take 1 tablet (20 mg total) by mouth daily  -     Lipid Panel with Direct LDL reflex; Future    Anxiety  -     LORazepam (ATIVAN) 1 mg tablet; Take 1 tablet (1 mg total) by mouth every 6 (six) hours as needed for anxiety    Generalized anxiety disorder    Tobacco abuse  -     nicotine (NICODERM CQ) 14 mg/24hr TD 24 hr patch; Place 1 patch on the skin every 24 hours  -     nicotine (NICODERM CQ) 7 mg/24hr TD 24 hr patch; Place 1 patch on the skin every 24 hours  -     nicotine polacrilex (NICORETTE) 4 mg gum; Chew 1 each (4 mg total) as needed for smoking cessation    Benign prostatic hyperplasia without lower urinary tract symptoms  -     PSA Total, Diagnostic; Future    Benign essential hypertension    Diabetes mellitus without complication (HCC)  -     Hemoglobin A1C; Future  -     Microalbumin / creatinine urine ratio    Depressive disorder    Encounter for screening for other viral diseases  -     Hepatitis C antibody; Future          Subjective:     Chief Complaint   Patient presents with    Diabetes        Patient ID: Anahy Kidney is a 66 y o  male      HPI    The following portions of the patient's history were reviewed and updated as appropriate: allergies, current medications, past family history, past medical history, past social history, past surgical history and problem list     Review of Systems   Constitutional: Negative for activity change, appetite change, fatigue, fever and unexpected weight change  HENT: Negative for congestion, dental problem and sneezing  Eyes: Negative for discharge and visual disturbance  Last eye check - 10/2018 and was OK  No diabetic change or cataracts  Respiratory: Negative for cough and wheezing  Gastrointestinal: Negative for abdominal pain, constipation, diarrhea, nausea and vomiting  Endocrine: Negative for polydipsia and polyuria  Genitourinary: Negative for dysuria and frequency  Musculoskeletal: Negative for arthralgias  Skin: Negative for rash  Allergic/Immunologic: Negative for environmental allergies and food allergies  Neurological: Positive for weakness  Negative for headaches  Walks without a leg brace or an assistive device  Weakness og the LUE and   No aphasia or dysarthria  Hematological: Negative for adenopathy  Psychiatric/Behavioral: Negative for behavioral problems and sleep disturbance  Objective:  Vitals:    11/12/18 1254   BP: 126/84   BP Location: Right arm   Patient Position: Sitting   Cuff Size: Standard   Pulse: 75   Resp: 18   Temp: (!) 97 3 °F (36 3 °C)   TempSrc: Temporal   SpO2: 98%   Weight: 86 9 kg (191 lb 9 6 oz)   Height: 5' 8" (1 727 m)      Physical Exam   Constitutional: He is oriented to person, place, and time  He appears well-developed and well-nourished  HENT:   Head: Normocephalic  Right Ear: External ear normal    Left Ear: External ear normal    Nose: Nose normal    Mouth/Throat: Oropharynx is clear and moist    Eyes: Pupils are equal, round, and reactive to light  Conjunctivae are normal  Right eye exhibits no discharge  Left eye exhibits no discharge  Neck: Normal range of motion  Neck supple  No thyromegaly present  Cardiovascular: Normal rate, regular rhythm and normal heart sounds  No murmur heard    Pulmonary/Chest: Effort normal and breath sounds normal  No respiratory distress  He has no wheezes  He has no rales  Abdominal: Soft  Bowel sounds are normal  There is no tenderness  Musculoskeletal: Normal range of motion  He exhibits no edema or tenderness  Lymphadenopathy:     He has no cervical adenopathy  Neurological: He is alert and oriented to person, place, and time  No cranial nerve deficit  Left hemiparesis - arm >> leg  Skin: Skin is warm and dry  No rash noted  Psychiatric: He has a normal mood and affect   His behavior is normal  Judgment and thought content normal

## 2018-11-12 NOTE — ASSESSMENT & PLAN NOTE
HBPM - none  Patient's blood pressure is controlled  Patient denies any side effects with medications  Patient educated on the importance of weight loss, and appropriate dieting  Patient admits to be compliant with medications  Labs reviewed from 7/24/18  No change in present therapy  Avoidance of salt

## 2018-11-12 NOTE — ASSESSMENT & PLAN NOTE
Smoking 1/2 PPD  Wants to quit  Counseled patient on the importance of smoking cessation  Patient acknowledges an understanding of the risks of smoking  At the very least, patient is agreeable to try cutting down  Discussed outside services to help with quitting smoking  Discussed the specifics of using nicotine patches, on a tapering schedule,  after stopping smoking  Tapering can last 6 months  While on the nicotine patches, I instructed on the use of PRN nicotine gum, instead of reaching for a cigarette, in a moment of weakness

## 2018-11-12 NOTE — ASSESSMENT & PLAN NOTE
BMI = 29 1  Has gained 3# since 7/24/18  Walks only occasionally  Patient is educated on the importance of portion control and dieting  Patient is also educated on the importance of exercise  Patient is encouraged to walk 30 min at least 5 times a week  Discussed about benefits of losing weight  Patient acknowledges that they understood what is discussed

## 2018-11-13 ENCOUNTER — OFFICE VISIT (OUTPATIENT)
Dept: OCCUPATIONAL THERAPY | Facility: REHABILITATION | Age: 58
End: 2018-11-13
Payer: COMMERCIAL

## 2018-11-13 DIAGNOSIS — I69.354 HEMIPARESIS AFFECTING LEFT SIDE AS LATE EFFECT OF CEREBROVASCULAR ACCIDENT (HCC): Primary | ICD-10-CM

## 2018-11-13 PROCEDURE — 97110 THERAPEUTIC EXERCISES: CPT

## 2018-11-13 PROCEDURE — 97112 NEUROMUSCULAR REEDUCATION: CPT

## 2018-11-13 NOTE — PROGRESS NOTES
Daily Note     Today's date: 2018  Patient name: Rosa Cardenas  : 1960  MRN: [id number]  Referring provider: Migdalia Zhou MD  Dx:   Encounter Diagnosis     ICD-10-CM    1  Hemiparesis affecting left side as late effect of cerebrovascular accident [facility]) Z49 [id number]                   Subjective: "I'm in the process of moving this week "      Objective: See treatment diary below  Pt participated in skilled OT focusing on tone reduction utilizing Bioness, LUE strengthening and grasp/release  Bioness to RUE for 10 min for tone reduction  2x10 of shoulder flex utilizing 2# therabar w/stabilization of L elbow for full ext  UE ranger to L hand for 3x10 of flex/ext and horizontal ABD/ADD  Supine on mat donning Bioness to L hand pt engaged in shoulder flexion for grasp/release during item retrieval [facility] pt w/placement to L, activity downgraded to seated due to difficulty w/place and hold during grasp/release in supine  Assessment: Tolerated treatment well  Improvement w/massed practice of grasp/release  Patient would benefit from continued OT      Plan: Continue per plan of care  INTERVENTION COMMENTS:  Diagnosis: I69 354  Precautions: CAD, Depression, Seizure (x1 occurring in 2014), HTN  FOTO: 60, with 90% limitation in Hand function and 37% limitation in UE function    60, with 54% limitation in UE function and 65% limitation in Hand function    70, with 54% limitation in UE function and 65% limitation in Hand function  Insurance: Payor: YENNI MUHAMMAD REP / Plan: Bridget Atkinson [facility] REP / Product Type: Medicare PPO /   20 of ____ visits, PN due 2018    Pt seen by Aristeo HAYS under direct supervision of Mikayla ODONNELL

## 2018-11-15 ENCOUNTER — APPOINTMENT (OUTPATIENT)
Dept: OCCUPATIONAL THERAPY | Facility: REHABILITATION | Age: 58
End: 2018-11-15
Payer: COMMERCIAL

## 2018-11-20 ENCOUNTER — OFFICE VISIT (OUTPATIENT)
Dept: OCCUPATIONAL THERAPY | Facility: REHABILITATION | Age: 58
End: 2018-11-20
Payer: COMMERCIAL

## 2018-11-20 DIAGNOSIS — I69.354 HEMIPARESIS AFFECTING LEFT SIDE AS LATE EFFECT OF CEREBROVASCULAR ACCIDENT (HCC): Primary | ICD-10-CM

## 2018-11-20 PROCEDURE — 97110 THERAPEUTIC EXERCISES: CPT

## 2018-11-20 PROCEDURE — 97112 NEUROMUSCULAR REEDUCATION: CPT

## 2018-11-20 NOTE — PROGRESS NOTES
Daily Note     Today's date: 2018  Patient name: Alicia Kumar  : 1960  MRN: [id number]  Referring provider: Dahlia Baxter MD  Dx:   Encounter Diagnosis     ICD-10-CM    1  Hemiparesis affecting left side as late effect of cerebrovascular accident [facility]) D90 [id number]                   Subjective: "I've been worn out from moving "      Objective: See treatment diary below  Pt participated in skilled OT focusing on LUE tone reduction, AROM, dynamic stabilization  Bioness to L hand for 10 min on neuro blessing mode  Supine, FF w/place and hold for about 1 min,1x10 reps  Unsupported in stance pt stabilized L hand onto bulletin board simultaneously stapling paper w/R hand  Seated pt completed 2x10 reps focusing on pronation/supination of L hand w/2# dumbbell w/ modA for about near full supination  Assessment: Tolerated treatment well About near half range w/VC for increased FF supine w/ G symmetry noted  Pt demo difficulty w/release of hand for stabilization on bulletin board due to increased tone reduction  Patient would benefit from continued OT      Plan: Continue per plan of care  INTERVENTION COMMENTS:  Diagnosis: I69 354  Precautions: CAD, Depression, Seizure (x1 occurring in 2014), HTN  FOTO: 60, with 90% limitation in Hand function and 37% limitation in UE function    60, with 54% limitation in UE function and 65% limitation in Hand function    70, with 54% limitation in UE function and 65% limitation in Hand function  Insurance: Payor: YENNI MUHAMMAD REP / [facility] / Product Type: Medicare PPO /   21 of ____ visits, PN due 2018      Pt seen by Mayuri HAYS under direct supervision of Zenaida ODONNELL

## 2018-11-26 ENCOUNTER — APPOINTMENT (OUTPATIENT)
Dept: LAB | Facility: HOSPITAL | Age: 58
End: 2018-11-26
Payer: COMMERCIAL

## 2018-11-26 DIAGNOSIS — E11.9 DIABETES MELLITUS WITHOUT COMPLICATION (HCC): ICD-10-CM

## 2018-11-26 DIAGNOSIS — N40.0 BENIGN PROSTATIC HYPERPLASIA WITHOUT LOWER URINARY TRACT SYMPTOMS: ICD-10-CM

## 2018-11-26 DIAGNOSIS — E78.49 OTHER HYPERLIPIDEMIA: ICD-10-CM

## 2018-11-26 DIAGNOSIS — Z11.59 ENCOUNTER FOR SCREENING FOR OTHER VIRAL DISEASES: ICD-10-CM

## 2018-11-26 LAB
CHOLEST SERPL-MCNC: 171 MG/DL
CREAT UR-MCNC: 103 MG/DL
HDLC SERPL-MCNC: 42 MG/DL (ref 40–59)
LDLC SERPL CALC-MCNC: 98 MG/DL
MICROALBUMIN UR-MCNC: 8.2 MG/L (ref 0–20)
MICROALBUMIN/CREAT 24H UR: 8 MG/G CREATININE (ref 0–30)
PSA SERPL-MCNC: 1.2 NG/ML (ref 0–4)
TRIGL SERPL-MCNC: 155 MG/DL

## 2018-11-26 PROCEDURE — 86803 HEPATITIS C AB TEST: CPT

## 2018-11-26 PROCEDURE — 84153 ASSAY OF PSA TOTAL: CPT

## 2018-11-26 PROCEDURE — 83036 HEMOGLOBIN GLYCOSYLATED A1C: CPT

## 2018-11-26 PROCEDURE — 80061 LIPID PANEL: CPT

## 2018-11-26 PROCEDURE — 82570 ASSAY OF URINE CREATININE: CPT | Performed by: FAMILY MEDICINE

## 2018-11-26 PROCEDURE — 82043 UR ALBUMIN QUANTITATIVE: CPT | Performed by: FAMILY MEDICINE

## 2018-11-26 PROCEDURE — 3061F NEG MICROALBUMINURIA REV: CPT | Performed by: FAMILY MEDICINE

## 2018-11-26 PROCEDURE — 36415 COLL VENOUS BLD VENIPUNCTURE: CPT

## 2018-11-27 ENCOUNTER — OFFICE VISIT (OUTPATIENT)
Dept: OCCUPATIONAL THERAPY | Facility: REHABILITATION | Age: 58
End: 2018-11-27
Payer: COMMERCIAL

## 2018-11-27 DIAGNOSIS — I69.354 HEMIPARESIS AFFECTING LEFT SIDE AS LATE EFFECT OF CEREBROVASCULAR ACCIDENT (HCC): Primary | ICD-10-CM

## 2018-11-27 LAB
EST. AVERAGE GLUCOSE BLD GHB EST-MCNC: 126 MG/DL
HBA1C MFR BLD: 6 % (ref 4.2–6.3)
HCV AB SER QL: NORMAL

## 2018-11-27 PROCEDURE — 97112 NEUROMUSCULAR REEDUCATION: CPT

## 2018-11-27 PROCEDURE — 97140 MANUAL THERAPY 1/> REGIONS: CPT

## 2018-11-27 NOTE — PROGRESS NOTES
Daily Note     Today's date: 2018  Patient name: Venita Daily  : 1960  MRN: [id number]  Referring provider: Tracie Cox MD  Dx:   Encounter Diagnosis     ICD-10-CM    1  Hemiparesis affecting left side as late effect of cerebrovascular accident [facility]) S57 108                   Subjective: "I'm trying to turn my wrist "      Objective: See treatment diary below  Pt participated in skilled OT focusing on LUE tone reduction, PROM, grasp/release w/functional reach  Bioness to L hand on neuro blessing mode for 10 min w/ NEMS to L shoulder simutaenously w/MH in LPS  Pt engaged in [facility] reach for grasp of cones w/placment to L for release utilizing Bioness on grasp/release mode  IASTM to bicep, supinators/pronators w/stretch to decrease tone and improve ROM in supination  KTape to forearm to facilitate supination  Assessment: Tolerated treatment well  Therapist assisted for ext of digits for grasp of cones  Verbal cues required for relaxation of UE to facilitate elbow extension in fwd reach w/fair carryover  Increased tone noted in bicep and pronators  Patient would benefit from continued OT      Plan: Continue per plan of care  INTERVENTION COMMENTS:  Diagnosis: I69 354  Precautions: CAD, Depression, Seizure (x1 occurring in 2014), HTN  FOTO: 60, with 90% limitation in Hand function and 37% limitation in UE function    60, with 54% limitation in UE function and 65% limitation in Hand function    70, with 54% limitation in UE function and 65% limitation in Hand function  Insurance: Payor: YENNI MUHAMMAD REP / Plan: [facility] / Product Type: Medicare PPO /   22 of ____ visits, PN due 2018    Pt seen by Yusra HAYS under direct supervision of Keyshawn ODONNELL

## 2018-11-29 ENCOUNTER — APPOINTMENT (OUTPATIENT)
Dept: OCCUPATIONAL THERAPY | Facility: REHABILITATION | Age: 58
End: 2018-11-29
Payer: COMMERCIAL

## 2019-01-03 ENCOUNTER — EVALUATION (OUTPATIENT)
Dept: OCCUPATIONAL THERAPY | Facility: REHABILITATION | Age: 59
End: 2019-01-03
Payer: COMMERCIAL

## 2019-01-03 DIAGNOSIS — I69.354 HEMIPARESIS AFFECTING LEFT SIDE AS LATE EFFECT OF CEREBROVASCULAR ACCIDENT (HCC): Primary | ICD-10-CM

## 2019-01-03 PROCEDURE — 97535 SELF CARE MNGMENT TRAINING: CPT | Performed by: OCCUPATIONAL THERAPIST

## 2019-01-03 PROCEDURE — G8991 OTHER PT/OT GOAL STATUS: HCPCS | Performed by: OCCUPATIONAL THERAPIST

## 2019-01-03 PROCEDURE — G8990 OTHER PT/OT CURRENT STATUS: HCPCS | Performed by: OCCUPATIONAL THERAPIST

## 2019-01-03 PROCEDURE — 97112 NEUROMUSCULAR REEDUCATION: CPT | Performed by: OCCUPATIONAL THERAPIST

## 2019-01-03 NOTE — LETTER
January 23, 2019    Joi Cantu MD  [address] [address]    Patient: Carlee Booth   YOB: 1960   Date of Visit: 1/3/2019     Encounter Diagnosis     ICD-10-CM    1  Hemiparesis affecting left side as late effect of cerebrovascular accident [facility]) F74 [id number]        Dear Dr Cohen May:    Please review the attached Plan of Care from Romelia Navarro recent visit  Please verify that you agree therapy should continue by signing the attached document and sending it back to our office  If you have any questions or concerns, please don't hesitate to call  Sincerely,    Mary Ellen Salazar OT      Referring Provider:     I certify that I have read the below Plan of Care and certify the need for these services furnished under this plan of treatment while under my care  Joi Cantu MD  [address]: [phone]            OCCUPATIONAL THERAPY RE- EVALUATION:    01/02/2019  Carlee Booth  1960  [id number]  Renita Calabrese MD  No diagnosis found  Subjective Evaluation    Quality of life: good          "I feel like I am doing about the same"  PATIENT GOAL: "I just want to improve the use of my hand  I want to be able to keep my hand opened more"    HISTORY OF PRESENT ILLNESS:     Pt is a 66 y o  male who was referred to Occupational Therapy s/p  Hemiparesis affecting L side as late effect of CVA  Pt reports having CVA in January 2014  Pt with initial symptoms of weakness on L side of body when in working environment  Pt was immediately taking to [address] [place] [address] with LOS x 1 month approximately and then was transferred to [facility] where Pt engaged in PT/OT services  Pt was then was D/Ankit to home environment and [facility]  Pt then transferred OT/PT services to [place] [address] Patient services    Pt reports having aide attend home environment every Monday for x 3 hours to complete laundry and mop/vacuum  Pt with history of x1 seizure in 2014; no seizure activity since  Pt reports noticing regression of functional use of LUE since D/Ankit from last therapy services  Pt with increased tone affecting L AROM  Pt reports no RHS worn  Pt lives in a single floor apartment on 6th floor with elevator access with son  Pt mod I with all ADLs at this time  Pt required aide to assist with IADLs, though reports completing cooking, dishes, and garbage at mod I status  Pt worked as a  prior to CVA-- Pt loss role of worker  Pt on LT disability at this time  Pt continues the role of  with no difficulties reported  PMH:   Past Medical History:   Diagnosis Date    CAD (coronary artery disease) 01/15/2014    100% occlusion of right    CVA (cerebral vascular accident) ([facility] [address] ) 01/15/2014    Depressive disorder     Dyslipidemia     Erectile dysfunction 2009    Hemiparesis ([facility] [address] ) 01/15/2014    left    Hypertension     Seizure ([facility] [address] ) 2014         Pain Levels:     Restin    With Activity:  0    Objective     Functional Assessment     Comments  See impairment section for further details  Impairment Observations:  1  Decreased functional use of LUE  Young Kevin Pt with self-report of increased functional use of LUE  Pt reports increased abilities to raise arm actively and unassistively to don deodrant  2  Increased tone    Improvement evident  3  Max difficulties terminating flexors on command    remains  4  1/2 thumb size sublux L shoulder  5  Decreased muscle endurance throughout LUE    Significant improvement  6  Exertional L tremors    Significant improvement evident  Dynamometer Position #2:   R: 65  75  75  78  L: 15  20  22  24  Pt is L hand dominant    Action:  3 point pinch: R 15 and L 5 5  Young Kevin Young Kevin R 17, L 1  R 19, L 1   R 17 5, L 0  2 point pinch: R 11 and L 2  Lateral pinch: R 16 5 and L 10    R 20, L 8  R 18, L 10    R 18, L 7    9-hole Peg Test: Unable to perform 2* L hemiparesis  Savita Sawyer remains      L AROM:  Shoulder elevation: full  Shoulder FF: near 1/4 with short lever and exertional tremors    1/4 with longer lever and reduced exertional tremors evident    Near 1/2 with short lever    1/2 with short lever  Shoulder ABD: near 1/2 with short lever    1/4 with longer lever and exertional tremors evident    Near 1/2 with longer lever, though exertional tremors still evident  ER: 1/2    Near 3/4    Near full with exertional tremors  IR: 3/4    full  Elbow ext: full  Elbow flex: near full    full  Sup: 1/2 with digits flexed   Pron: full  Wrist flex: 1/4  Wrist ext: near 1/4    Neutral   Composite: 3/4 with thumb adducted    Near full with thumb adducted    Near full  Opposition: unable to perform 2* Max difficulties with terminating flexors on command    remains  Finger to nose: unable to peform    remains  Dysdiadochokinesia: unable to perform    remains    SUPINE L AROM:  Shoulder FF: 1/2 with long lever    Near 3/4 with long lever  Shoulder ABD: 1/4 with long lever    1/2 with long lever    Near 1/2 with long lever  Elbow ext: full  Elbow flex: 3/4    full  Sup: neutral  1/2  Savita Sawyer 3/4  Pron: full  Tricep AG: 3/4 with difficulties terminating flexors on command 2* tone    Full with significant improved abilities to terminate flexors on command    Remains with improvement evident  Horizontal ABD: full  Horizontal ADD: near full    remains    Finger size  sublux  Symmetrical upright seated posture B/L shoulders     Pt demo with fair + functional progression towards goals in POC  Pt with improved L AROM, improved abilities to terminate flexors on command, increased muscle endurance, and improved overall functional use of LUE    OTR recommending Pt to continue skilled OT services 1-2x/week for 4-6 weeks to continue to focus on abilities to terminate flexors on command, increased muscle endurance, forward functional reach, open/close grasp functions to improve overall functional use of LUE  OTR provided Pt with theraband HEP to be performed in home environment  Assessment  Assessment details: See skilled analysis for further details  Skilled Analysis:  Pt is a 66 y o  male referred to Occupational Therapy s/p hemiparesis affecting L side as late effect of CVA  Pt presents with decreased functional use of LUE, Max difficulties terminating flexors on command affecting normal movement patterns, increased tone affecting L AROM, 1/2 thumb size sublux in L shoulder, exertional L tremors, and decreased L muscle endurance affecting engagement in meaningful occupations  Pt will benefit from skilled Occupational Therapy services 2x/week for 12 weeks with focus on neuro re-ed, RHS fabrication, manual tx, BIONESS (if cleared by neurologist--awaiting for clearance), and self-care management to increase functional use of LUE and for overall improved QOL       Short Term Goals:  - Pt will demo with decreased LUE hypertonicity for improved grasp release, termination of flexors on command  50% of time 4 weeks--- PARTIALLY MET  - Pt will increase LUE to functional assist with <20% cuing for tabletop tasks and engagement in salient tasks 4 weeks--  MET  - Pt will tolerate BIONESS for improved motor and sensory performance for overall improved hand to target with 80% accuracy 4 weeks-- PARTIALLY MET  - Pt will demo good carryover of clinic and home tone reduction strategies for improved L AROM initiation with functional reach, dressing, hygiene 4 weeks-- MET  - Pt will increase compliance with  RHS or C-Roll for improved LPS with  improved fit/decreased discomfort with wear time 4 weeks--  MET  - Pt will demo with G carryover of Home Exercise Program to improve functional progression towards goals in Plan of care and for improved functional use of LUE 4 weeks-- MET        Long Term Goals:  - Pt will tolerate RHS or C-Roll x 6-8 hours for tendon elongation and  decreased tone in left hand-- PARTIALLY MET  - Pt will demo with decreased hypertonicity of  L  UE to WNL for automatic grasp/ release  and functional reach with grooming-- NOT MET  - Pt will demo with decreased exertional tremors with functional reach for normalized movement pattern of  L  UE-- PARTIALLY MET  - Pt will resume  L  hand dominance to refined functional assist with all activities of daily living and engagement in salient tasks-- NOT MET  - Pt will demo with decreased hypertonicity of LUE to WNL for improved alternate motor patterns, termination on command for improved dressing/hygiene and engagement in salient tasks-- NOT MET        OTHER PLANNED THERAPY INTERVENTIONS:   Supine, seated, and in stance neuro re-ed  Tricep AG  BIONESS (received clearance by neurologist)  Manual tx  Seated functional reach: crossing midline  Supine place and hold  WBearing strategies  Approximate pad pinch         INTERVENTION COMMENTS:  Diagnosis: I69 354  Precautions: CAD, Depression, Seizure (x1 occurring in December 2014), HTN  FOTO: 60, with 90% limitation in Hand function and 37% limitation in UE function    60, with 54% limitation in UE function and 65% limitation in Hand function    70, with 54% limitation in UE function and 65% limitation in Hand function    71, with 48% limitations in UE function and 60% limitation in Hand function  Insurance: Payor: YENNI MUHAMMAD REP / Plan: Trevor JOLLY  REP / Product Type: Medicare PPO /   1 of ____ visits, PN due 02/03/2019

## 2019-01-03 NOTE — PROGRESS NOTES
OCCUPATIONAL THERAPY RE- EVALUATION:    01/02/2019  Rosa Cardenas  1960  [id number]  Migdalia Zhou MD  No diagnosis found  Subjective Evaluation    Quality of life: good          "I feel like I am doing about the same"  PATIENT GOAL: "I just want to improve the use of my hand  I want to be able to keep my hand opened more"    HISTORY OF PRESENT ILLNESS:     Pt is a 66 y o  male who was referred to Occupational Therapy s/p  Hemiparesis affecting L side as late effect of CVA  Pt reports having CVA in January 2014  Pt with initial symptoms of weakness on L side of body when in working environment  Pt was immediately taking to [address] [place] [address] with LOS x 1 month approximately and then was transferred to [facility] where Pt engaged in PT/OT services  Pt was then was D/Ankit to home environment and participated in PT/OT services at [facility]  Pt then transferred OT/PT services to [place] [address] Patient services  Pt reports having aide attend home environment every Monday for x 3 hours to complete laundry and mop/vacuum  Pt with history of x1 seizure in December 2014; no seizure activity since  Pt reports noticing regression of functional use of LUE since D/Ankit from last therapy services  Pt with increased tone affecting L AROM  Pt reports no RHS worn  Pt lives in a single floor apartment on 6th floor with elevator access with son  Pt mod I with all ADLs at this time  Pt required aide to assist with IADLs, though reports completing cooking, dishes, and garbage at mod I status  Pt worked as a  prior to CVA-- Pt loss role of worker  Pt on LT disability at this time  Pt continues the role of  with no difficulties reported        PMH:   Past Medical History:   Diagnosis Date    CAD (coronary artery disease) 01/15/2014    100% occlusion of right    CVA (cerebral vascular [address]) ([facility] [address] ) 01/15/2014    Depressive disorder     Dyslipidemia     Erectile dysfunction 2009    Hemiparesis ([facility] ) 01/15/2014    left    Hypertension     Seizure ([facility] ) 2014         Pain Levels:     Restin    With Activity:  0    Objective     Functional Assessment     Comments  See impairment section for further details  Impairment Observations:  1  Decreased functional use of LUE  Elisabet Soulier Pt with self-report of increased functional use of LUE  Pt reports increased abilities to raise arm actively and unassistively to don deodrant  2  Increased tone    Improvement evident  3  Max difficulties terminating flexors on command    remains  4  1/2 thumb size sublux L shoulder  5  Decreased muscle endurance throughout LUE    Significant improvement  6  Exertional L tremors    Significant improvement evident  Dynamometer Position #2:   R: 65  75  75  78  L: 15  20  22  24  Pt is L hand dominant    Action:  3 point pinch: R 15 and L 5 5  Elisabet Soulier Elisabet Soulier R 17, L 1  R 19, L 1  R 17 5, L 0  2 point pinch: R 11 and L 2  Lateral pinch: R 16 5 and L 10    R 20, L 8  R 18, L 10    R 18, L 7    9-hole Peg Test: Unable to perform 2* L hemiparesis  Elisabet Soulier remains      L AROM:  Shoulder elevation: full  Shoulder FF: near 1/4 with short lever and exertional tremors    1/4 with longer lever and reduced exertional tremors evident    Near 1/2 with short lever    1/2 with short lever  Shoulder ABD: near 1/2 with short lever    1/4 with longer lever and exertional tremors evident    Near 1/2 with longer lever, though exertional tremors still evident  ER: 1/2    Near 3/4    Near full with exertional tremors  IR: 3/4    full  Elbow ext: full  Elbow flex: near full    full  Sup: 1/2 with digits flexed   Pron: full  Wrist flex: 1/4  Wrist ext: near 1/4    Neutral   Composite: 3/4 with thumb adducted    Near full with thumb adducted    Near full  Opposition: unable to perform 2* Max difficulties with terminating flexors on command    remains  Finger to nose: unable to peform    remains  Dysdiadochokinesia: unable to perform    remains    SUPINE L AROM:  Shoulder FF: 1/2 with long lever    Near 3/4 with long lever  Shoulder ABD: 1/4 with long lever    1/2 with long lever    Near 1/2 with long lever  Elbow ext: full  Elbow flex: 3/4    full  Sup: neutral  1/2  Kaitlin Joseph 3/4  Pron: full  Tricep AG: 3/4 with difficulties terminating flexors on command 2* tone    Full with significant improved abilities to terminate flexors on command    Remains with improvement evident  Horizontal ABD: full  Horizontal ADD: near full    remains    Finger size  sublux  Symmetrical upright seated posture B/L shoulders     Pt demo with fair + functional progression towards goals in POC  Pt with improved L AROM, improved abilities to terminate flexors on command, increased muscle endurance, and improved overall functional use of LUE  OTR recommending Pt to continue skilled OT services 1-2x/week for 4-6 weeks to continue to focus on abilities to terminate flexors on command, increased muscle endurance, forward functional reach, open/close grasp functions to improve overall functional use of LUE  OTR provided Pt with theraband HEP to be performed in home environment  Assessment  Assessment details: See skilled analysis for further details  Skilled Analysis:  Pt is a 66 y o  male referred to Occupational Therapy s/p hemiparesis affecting L side as late effect of CVA  Pt presents with decreased functional use of LUE, Max difficulties terminating flexors on command affecting normal movement patterns, increased tone affecting L AROM, 1/2 thumb size sublux in L shoulder, exertional L tremors, and decreased L muscle endurance affecting engagement in meaningful occupations    Pt will benefit from skilled Occupational Therapy services 2x/week for 12 weeks with focus on neuro re-ed, RHS fabrication, manual tx, BIONESS (if cleared by neurologist--awaiting for clearance), and self-care management to increase functional use of LUE and for overall improved QOL       Short Term Goals:  - Pt will demo with decreased LUE hypertonicity for improved grasp release, termination of flexors on command  50% of time 4 weeks--- PARTIALLY MET  - Pt will increase LUE to functional assist with <20% cuing for tabletop tasks and engagement in salient tasks 4 weeks--  MET  - Pt will tolerate BIONESS for improved motor and sensory performance for overall improved hand to target with 80% accuracy 4 weeks-- PARTIALLY MET  - Pt will demo good carryover of clinic and home tone reduction strategies for improved L AROM initiation with functional reach, dressing, hygiene 4 weeks-- MET  - Pt will increase compliance with  RHS or C-Roll for improved LPS with  improved fit/decreased discomfort with wear time 4 weeks--  MET  - Pt will demo with G carryover of Home Exercise Program to improve functional progression towards goals in Plan of care and for improved functional use of LUE 4 weeks-- MET        Long Term Goals:  - Pt will tolerate RHS or C-Roll x 6-8 hours for tendon elongation and  decreased tone in left hand-- PARTIALLY MET  - Pt will demo with decreased hypertonicity of  L  UE to WNL for automatic grasp/ release  and functional reach with grooming-- NOT MET  - Pt will demo with decreased exertional tremors with functional reach for normalized movement pattern of  L  UE-- PARTIALLY MET  - Pt will resume  L  hand dominance to refined functional assist with all activities of daily living and engagement in salient tasks-- NOT MET  - Pt will demo with decreased hypertonicity of LUE to WNL for improved alternate motor patterns, termination on command for improved dressing/hygiene and engagement in salient tasks-- NOT MET        OTHER PLANNED THERAPY INTERVENTIONS:   Supine, seated, and in stance neuro re-ed  Mellissa DOLL (received clearance by neurologist)  Manual tx  Seated functional reach: crossing midline  Supine place and hold  [employer] strategies  Approximate pad pinch         INTERVENTION COMMENTS:  Diagnosis: I69 354  Precautions: CAD, Depression, Seizure (x1 occurring in December 2014), HTN  FOTO: 60, with 90% limitation in Hand function and 37% limitation in UE function    60, with 54% limitation in UE function and 65% limitation in Hand function    70, with 54% limitation in UE function and 65% limitation in Hand function    71, with 48% limitations in UE function and 60% limitation in Hand function  Insurance: Payor: YENNI MUHAMMAD REP / Plan: Katie JOLLY  REP / Product Type: Medicare PPO /   1 of ____ visits, PN due 02/03/2019

## 2019-01-10 ENCOUNTER — OFFICE VISIT (OUTPATIENT)
Dept: OCCUPATIONAL THERAPY | Facility: REHABILITATION | Age: 59
End: 2019-01-10
Payer: COMMERCIAL

## 2019-01-10 DIAGNOSIS — I69.354 HEMIPARESIS AFFECTING LEFT SIDE AS LATE EFFECT OF CEREBROVASCULAR ACCIDENT (HCC): Primary | ICD-10-CM

## 2019-01-10 PROCEDURE — 97535 SELF CARE MNGMENT TRAINING: CPT | Performed by: OCCUPATIONAL THERAPIST

## 2019-01-10 PROCEDURE — 97112 NEUROMUSCULAR REEDUCATION: CPT | Performed by: OCCUPATIONAL THERAPIST

## 2019-01-10 NOTE — PROGRESS NOTES
Daily Note     Today's date: 1/10/2019  Patient name: Vince Pelletier  : 1960  MRN: [id number]  Referring provider: Graciela Aguilera MD  Dx: No diagnosis found  Subjective: "I missed really working my arm with you"  Objective: See treatment description below    MH with LPS with BIONESS donned x 10 minutes on neuro mod setting, followed by supine neuro re-ed  Pt performed 3 sets x 10 reps of shoulder FF and 3 sets x 10 reps of shoulder ABD/ADD with BIONESS donned on open hand function to improve termination of flexors on command  Assessment: Tolerated treatment well  Patient would benefit from continued OT     Pt tolerated tx session well with G muscle endurance evident throughout full duration of tx session with breaks taken  Pt presented with improved elbow extension throughout range with no ataxic movements evident  Pt able to achieve near full range of shoulder FF and near 1/2 range for Shoulder ABD with improved motor control evident  To further address open and close grasp functions next session  Plan: Continue per plan of care  INTERVENTION COMMENTS:  Diagnosis: I69 354  Precautions: CAD, Depression, Seizure (x1 occurring in 2014), HTN  FOTO: 60, with 90% limitation in Hand function and 37% limitation in UE function    60, with 54% limitation in UE function and 65% limitation in Hand function    70, with 54% limitation in UE function and 65% limitation in Hand function    71, with 48% limitations in UE function and 60% limitation in Hand function  Insurance: Payor: YENNI  REP / [facility] / Product Type: Medicare PPO /   2 of ____ visits, PN due 2019

## 2019-01-17 ENCOUNTER — OFFICE VISIT (OUTPATIENT)
Dept: OCCUPATIONAL THERAPY | Facility: REHABILITATION | Age: 59
End: 2019-01-17
Payer: COMMERCIAL

## 2019-01-17 DIAGNOSIS — I69.354 HEMIPARESIS AFFECTING LEFT SIDE AS LATE EFFECT OF CEREBROVASCULAR ACCIDENT (HCC): Primary | ICD-10-CM

## 2019-01-17 PROCEDURE — 97150 GROUP THERAPEUTIC PROCEDURES: CPT | Performed by: OCCUPATIONAL THERAPIST

## 2019-01-17 PROCEDURE — 97112 NEUROMUSCULAR REEDUCATION: CPT | Performed by: OCCUPATIONAL THERAPIST

## 2019-01-17 NOTE — PROGRESS NOTES
Daily Note     Today's date: 2019  Patient name: Onelia Urbina  : 1960  MRN: [id number]  Referring provider: Arlin Ly MD  Dx: No diagnosis found  Subjective: "That was a really good one for me"  Objective: See treatment description below    BIONESS donned x 10 minutes on neuro mod setting and then open/close grasp setting while simultaneously performing supine neuro re-ed-- Pt performed 3 sets x 10 reps of shoulder FF with focus on increased AROM, muscle endurance, and motor control  Seated neuro re-ed with BIONESS donned on open/close grasp functions with 9 seconds set to facilitate open hand and 4 seconds to facilitate close functions to retrieve BB and place over elevated wedge with shoulder ABD  Xiheamarilisa Bettina in quadruped to BUE with modified push ups with focus on improved proximal strength/stability and wrist/digit extension (tone reduction), followed by approximated pad pinch  Assessment: Tolerated treatment well  Patient would benefit from continued OT    Pt demo with excellent tolerance to tx session on this date with excellent response to therapeutic techniques  Pt with G abilities maintaining elbow extension throughout range with abilities to achieve shoulder FF to near full with improved muscle endurance  Pt with initial Max difficulties maintaining grasp and open functions of hand with BIONESS donned for BB retrieval, though significant improvement evident with massed practice with abilities to place x 10 BB over elevated wedge  Pt with improved open hand abilities with vision occluded  Pt responded well to Wadea Bettina techniques with improved abilities to terminate flexors on command when performing approximated pad pinch  OTR with recommendations to perform in-stance WBearing onto sturdy surface in home environment followed by attempting open/close grasp functions to improve automaticity and active response  Plan: Continue per plan of care  INTERVENTION COMMENTS:  Diagnosis: I69 354  Precautions: CAD, Depression, Seizure (x1 occurring in December 2014), HTN  FOTO: 60, with 90% limitation in Hand function and 37% limitation in UE function    60, with 54% limitation in UE function and 65% limitation in Hand function    70, with 54% limitation in UE function and 65% limitation in Hand function    71, with 48% limitations in UE function and 60% limitation in Hand function  Insurance: Payor: YENNI MUHAMMAD REP / Plan: Joanne Dejesus [facility] REP / Product Type: Medicare PPO /   3 of ____ visits, PN due 02/03/2019    [id number]  [id number] 1:1  [id number] [facility]  [id number] unsupervised

## 2019-01-23 ENCOUNTER — TRANSCRIBE ORDERS (OUTPATIENT)
Dept: PHYSICAL THERAPY | Facility: REHABILITATION | Age: 59
End: 2019-01-23

## 2019-01-23 DIAGNOSIS — I63.9 CEREBROVASCULAR ACCIDENT (CVA), UNSPECIFIED MECHANISM (HCC): Primary | ICD-10-CM

## 2019-01-24 ENCOUNTER — OFFICE VISIT (OUTPATIENT)
Dept: OCCUPATIONAL THERAPY | Facility: REHABILITATION | Age: 59
End: 2019-01-24
Payer: COMMERCIAL

## 2019-01-24 ENCOUNTER — OFFICE VISIT (OUTPATIENT)
Dept: NEUROLOGY | Facility: CLINIC | Age: 59
End: 2019-01-24
Payer: COMMERCIAL

## 2019-01-24 VITALS
HEART RATE: 83 BPM | HEIGHT: 70 IN | WEIGHT: 193.8 LBS | DIASTOLIC BLOOD PRESSURE: 74 MMHG | BODY MASS INDEX: 27.75 KG/M2 | RESPIRATION RATE: 18 BRPM | SYSTOLIC BLOOD PRESSURE: 110 MMHG

## 2019-01-24 DIAGNOSIS — I69.354 HEMIPARESIS AFFECTING LEFT SIDE AS LATE EFFECT OF CEREBROVASCULAR ACCIDENT (HCC): ICD-10-CM

## 2019-01-24 DIAGNOSIS — I69.354 HEMIPARESIS AFFECTING LEFT SIDE AS LATE EFFECT OF CEREBROVASCULAR ACCIDENT (HCC): Primary | ICD-10-CM

## 2019-01-24 DIAGNOSIS — I65.23 CAROTID STENOSIS, BILATERAL: ICD-10-CM

## 2019-01-24 DIAGNOSIS — G40.89 OTHER SEIZURES (HCC): Primary | ICD-10-CM

## 2019-01-24 PROCEDURE — 99213 OFFICE O/P EST LOW 20 MIN: CPT | Performed by: PSYCHIATRY & NEUROLOGY

## 2019-01-24 PROCEDURE — 97112 NEUROMUSCULAR REEDUCATION: CPT

## 2019-01-24 PROCEDURE — 97535 SELF CARE MNGMENT TRAINING: CPT

## 2019-01-24 NOTE — ASSESSMENT & PLAN NOTE
With an apparent occlusion on right but to this point no apparent flow consequential stenosis on the left  Maintains ongoing follow-up through vascular surgery with sequential carotid ultrasounds  --to continue his regular follow-up with vascular surgery

## 2019-01-24 NOTE — ASSESSMENT & PLAN NOTE
Occurred in January 2014 with stable/unchanged left-sided spastic deficit  No symptoms to suggest any further cerebrovascular ischemic events  --continues on a daily 81 mg aspirin  --continues on statin  --continues to work with his primary care physician regarding addressing his vascular risk factors

## 2019-01-24 NOTE — ASSESSMENT & PLAN NOTE
Single seizure event, partial in onset with secondary generalization, with focus established by his stroke event which occurred 11 months earlier  Has remained on levetiracetam and continues free of seizure in seizure-like events  Reports no symptoms that would suggest any adverse side effects from levetiracetam   --continue levetiracetam 1000 mg twice daily  --with him on chronic levetiracetam, check CBC, CMP and levetiracetam level  --we briefly discussed the possibility of withdrawal of his antiepileptic medication at some point in time  It was decided to follow a conservative approach and reassess the potential for withdrawal after six years free of events

## 2019-01-24 NOTE — LETTER
January 24, 2019     Vara Kawasaki, MD  [address] [address]    Patient: Alicia Kumar   YOB: 1960   Date of Visit: 1/24/2019       Dear Dr Car Best:    Thank you for referring Herman Limb to me for evaluation  Below are my notes for this consultation  If you have questions, please do not hesitate to call me  I look forward to following your patient along with you           Sincerely,        Jeremy Parker MD        CC: Karen Taylor MD

## 2019-01-24 NOTE — PROGRESS NOTES
Daily Note     Today's date: 2019  Patient name: Gerald Rodriguez  : 1960  MRN: [id number]  Referring provider: Brittany Arellano MD  Dx:   Encounter Diagnosis   Name Primary?  Hemiparesis affecting left side as late effect of cerebrovascular accident ([facility] ) Yes                  Subjective: "Everything is going really well, I'm happy "       Objective: See treatment diary below    BIONESS with MH to R forearm  IASTM/edema massage to right hand and forearm for tone reduction  WB in quadriped focusing on shoulder strengthening, digit and wrist extension crossing midline for peg retrieval utilizing RUE facilitating weight into LUE  Bioness with grasp/release modual focusing on open/close hand with block transfer  Assessment: Tolerated treatment well  Pt unable to stabilize Pball supine on mat facilitating digit extension with OH reach in FF, however improvement noted in tone reduction digits and wrist with time at task in quadriped  Pt with F grasp/release for about 30% of task donning BIONESS, improvment noted in full LUE enxtension with decrease in shoulder compensation with mass practice and tactile cues  Patient would benefit from continued OT      Plan: Continued skilled OT per POC     INTERVENTION COMMENTS:  Diagnosis: I69 354  Precautions: CAD, Depression, Seizure (x1 occurring in 2014), HTN  FOTO: 60, with 90% limitation in Hand function and 37% limitation in UE function    60, with 54% limitation in UE function and 65% limitation in Hand function    70, with 54% limitation in UE function and 65% limitation in Hand function    71, with 48% limitations in UE function and 60% limitation in Hand function  Insurance: Payor: Crist[facility] REP / Plan: Merly Hallman [facility] REP / Product Type: Medicare PPO /   4 of ____ visits, PN due 2019

## 2019-01-24 NOTE — PROGRESS NOTES
Patient is here today for his stroke and seizures    Patient ID: Claudetta Madrid is a 66 y o  male  Assessment/Plan:    Other seizures ([facility] [address] )  Single seizure event, partial in onset with secondary generalization, with focus established by his stroke event which occurred 11 months earlier  Has remained on levetiracetam and continues free of seizure in seizure-like events  Reports no symptoms that would suggest any adverse side effects from levetiracetam   --continue levetiracetam 1000 mg twice daily  --with him on chronic levetiracetam, check CBC, CMP and levetiracetam level  --we briefly discussed the possibility of withdrawal of his antiepileptic medication at some point in time  It was decided to follow a conservative approach and reassess the potential for withdrawal after six years free of events  Hemiparesis affecting left side as late effect of cerebrovascular accident [facility])  Occurred in January 2014 with stable/unchanged left-sided spastic deficit  No symptoms to suggest any further cerebrovascular ischemic events  --continues on a daily 81 mg aspirin  --continues on statin  --continues to work with his primary care physician regarding addressing his vascular risk factors  Carotid stenosis, bilateral  With an apparent occlusion on right but to this point no apparent flow consequential stenosis on the left  Maintains ongoing follow-up through vascular surgery with sequential carotid ultrasounds  --to continue his regular follow-up with vascular surgery  He will follow up in six months or p r n     Subjective:    HPI  Patient, 66years of age, is followed here for his historical single seizure which occurred in December of 2014 with the focus presumed secondary to a right hemisphere stroke which occurred in January of 2014  Today he was accompanied by one of his aids, Magalys Huynh    Since his last appointment here some six months ago, he has continued on his levetiracetam coverage with 1000 mg twice daily  He has had no symptoms to suggest seizure or seizure-like activity  He reports no symptoms to suggest any adverse side effects  With regard to his remote stroke event, he has a persistent spastic left max paresis, unchanged  He has known bilateral carotid disease with an apparent occlusion of right internal carotid artery, chronic, but to this point not an apparent flow consequential stenosis on the left  However, he maintains ongoing follow-up with vascular surgery including sequential carotid ultrasound studies        Past Medical History:   Diagnosis Date    CAD (coronary artery disease) 01/15/2014    100% occlusion of right    CVA [facility]cere[facility] vascular accident) ([facility] ) 01/15/2014    Depressive disorder     Diabetes ([facility] )     Dyslipidemia     Erectile dysfunction 2009    Hemiparesis ([facility] ) 01/15/2014    left    Hypertension     Seizure ([place] [address] ) 12/14/2014    Stroke [facility])      Past Surgical History:   Procedure Laterality Date    NO PAST SURGERIES       Social History     Social History    Marital status: Single     Spouse name: N/A    Number of children: N/A    Years of education: N/A     Social History Main Topics    Smoking status: Current Every Day Smoker     Types: Cigars, Cigarettes    Smokeless tobacco: Current User      Comment: heavy tob smoke - 3 cigars day - quit 1/1/14- no passive smoke exposure as per [employer]    Alcohol use Yes      Comment: occasional    Drug use: No    Sexual activity: Not Asked     Other Topics Concern    None     Social History Narrative    None     Family History   Problem Relation Age of Onset    Hypertension Family     Diabetes Family     Cancer Mother     Diabetes Mother     Diabetes Father     Diabetes Brother      Allergies   Allergen Reactions    No Active Allergies     No Known Allergies        Current Outpatient Prescriptions:     aspirin 81 mg chewable tablet, Chew 1 tablet every 24 hours, Disp: , Rfl:     atorvastatin (LIPITOR) 20 mg tablet, Take 1 tablet (20 mg total) by mouth daily, Disp: 90 tablet, Rfl: 3    gabapentin (NEURONTIN) 100 mg capsule, Take 1 capsule (100 mg total) by mouth 2 (two) times a day, Disp: 180 capsule, Rfl: 3    glucose blood test strip, daily, Disp: , Rfl:     Lancets (ONETOUCH ULTRASOFT) lancets, daily, Disp: , Rfl:     levETIRAcetam (KEPPRA) 1000 MG tablet, Take 1 tablet (1,000 mg total) by mouth 2 (two) times a day, Disp: 60 tablet, Rfl: 5    LORazepam (ATIVAN) 1 mg tablet, Take 1 tablet (1 mg total) by mouth every 6 (six) hours as needed for anxiety, Disp: 30 tablet, Rfl: 0    losartan (COZAAR) 50 mg tablet, Take 1 tablet by mouth daily, Disp: , Rfl:     metFORMIN (GLUCOPHAGE) 1000 MG tablet, Take 1 tablet by mouth Every 12 hours, Disp: , Rfl:     nicotine polacrilex (NICORETTE) 4 mg gum, Chew 1 each (4 mg total) as needed for smoking cessation, Disp: 100 each, Rfl: 2    sertraline (ZOLOFT) 100 mg tablet, Take 1 tablet by mouth daily, Disp: , Rfl:     nicotine (NICODERM CQ) 14 mg/24hr TD 24 hr patch, Place 1 patch on the skin every 24 hours (Patient not taking: Reported on 1/24/2019 ), Disp: 28 patch, Rfl: 1    nicotine (NICODERM CQ) 7 mg/24hr TD 24 hr patch, Place 1 patch on the skin every 24 hours (Patient not taking: Reported on 1/24/2019 ), Disp: 28 patch, Rfl: 2    Objective:    Blood pressure 110/74, pulse 83, resp  rate 18, height 5' 10" (1 778 m), weight 87 9 kg (193 lb 12 8 oz)  Physical Exam  Lungs clear to auscultation  Rhythm regular  Neurological Exam  Alert  Pleasantly interactive  Fully oriented  No significant dysarthria  Left hemiparetic gait, but gait independent  Cranial nerves 2-12 tested and grossly intact except for left central facial weakness which is unchanged from the past   Continued unchanged presence of a spastic left max paresis, arm involve more than leg  Reflexes increased on left as compared to right, unchanged      ROS:    Review of Systems Constitutional: Positive for appetite change  Negative for fever  HENT: Negative  Negative for hearing loss, tinnitus, trouble swallowing and voice change  Eyes: Negative  Negative for photophobia and pain  Respiratory: Negative  Negative for shortness of breath  Cardiovascular: Negative  Negative for palpitations  Gastrointestinal: Negative  Negative for nausea and vomiting  Endocrine: Negative  Negative for cold intolerance and heat intolerance  Genitourinary: Positive for frequency and urgency  Negative for dysuria  Musculoskeletal: Positive for back pain (at times)  Negative for myalgias and neck pain  Skin: Negative  Negative for rash  Allergic/Immunologic: Negative  Neurological: Negative  Negative for dizziness, tremors, seizures, syncope, facial asymmetry, speech difficulty, weakness, light-headedness, numbness and headaches  Hematological: Negative  Does not bruise/bleed easily  Psychiatric/Behavioral: Negative for confusion, hallucinations and sleep disturbance  The patient is nervous/anxious  I personally reviewed the ROS that was entered by the medical assistant  *Please note this document was created using voice recognition software and may contain sound-alike word errors  *

## 2019-01-31 ENCOUNTER — OFFICE VISIT (OUTPATIENT)
Dept: OCCUPATIONAL THERAPY | Facility: REHABILITATION | Age: 59
End: 2019-01-31
Payer: COMMERCIAL

## 2019-01-31 DIAGNOSIS — I69.354 HEMIPARESIS AFFECTING LEFT SIDE AS LATE EFFECT OF CEREBROVASCULAR ACCIDENT (HCC): Primary | ICD-10-CM

## 2019-01-31 PROCEDURE — 97535 SELF CARE MNGMENT TRAINING: CPT

## 2019-01-31 PROCEDURE — 97112 NEUROMUSCULAR REEDUCATION: CPT

## 2019-01-31 NOTE — PROGRESS NOTES
Daily Note     Today's date: 2019  Patient name: Yaneth Quinones  : 1960  MRN: [id number]  Referring provider: Simon Larose MD  Dx:   Encounter Diagnosis   Name Primary?  Hemiparesis affecting left side as late effect of cerebrovascular accident ([facility] [address] ) Yes                  Subjective: "My hand was completely open at home"  Objective: See treatment diary below  BIONESS on motor setting followed by IASTM to forearm  UEB unilaterally with sustained  for 2:30 demo- forward reach w/o trunk compensation during activity  BB retreival focusing on grasp/release with near 3/4 range supination and 1/4 range abduction w/min shoulder hike crossing midline, mod/max difficulty to w/digit extension to release BB  BIONESS on open hand setting facilitating digit extension  Assessment: Tolerated treatment well  Pt demo-difficulty to terminate flexors on command during grasp/release task  Frequent cues required to follow through with normal movement pattern of LUE in forward reach to decrease shoulder hike  Improvement noted in forearm supination during functional task  Patient would benefit from continued OT       Plan: Continued skilled OT per POC     INTERVENTION COMMENTS:  Diagnosis: I69 354  Precautions: CAD, Depression, Seizure (x1 occurring in 2014), HTN  FOTO: 60, with 90% limitation in Hand function and 37% limitation in UE function    60, with 54% limitation in UE function and 65% limitation in Hand function    70, with 54% limitation in UE function and 65% limitation in Hand function    71, with 48% limitations in UE function and 60% limitation in Hand function  Insurance: Payor: [employer]  REP / Plan: Erin JOLLY  REP / Product Type: Medicare PPO /   5 of ____ visits, PN due 2019

## 2019-02-05 ENCOUNTER — OFFICE VISIT (OUTPATIENT)
Dept: OCCUPATIONAL THERAPY | Facility: REHABILITATION | Age: 59
End: 2019-02-05
Payer: COMMERCIAL

## 2019-02-05 DIAGNOSIS — I69.354 HEMIPARESIS AFFECTING LEFT SIDE AS LATE EFFECT OF CEREBROVASCULAR ACCIDENT (HCC): Primary | ICD-10-CM

## 2019-02-05 PROCEDURE — 97150 GROUP THERAPEUTIC PROCEDURES: CPT

## 2019-02-05 PROCEDURE — 97112 NEUROMUSCULAR REEDUCATION: CPT

## 2019-02-05 NOTE — PROGRESS NOTES
Daily Note     Today's date: 2019  Patient name: Prince Hubbard  : 1960  MRN: [id number]  Referring provider: Larisa Scruggs MD  Dx:   Encounter Diagnosis   Name Primary?  Hemiparesis affecting left side as late effect of cerebrovascular accident ([facility] [address] ) Yes                  Subjective: "Oh, I like this exercise  You didn't have me do this before"  Objective: See treatment diary below    BIONESS followed by ISTM facilitating tone reduction and muscle strengthening  Therex engaging with 2# tbar focusing on LUE extension, 2x10 protraction/retraction, MMP w/forward rows to low surface  WB into LUE donning sandwich splint w/BB retrieval to left utilizing RUE crossing midline  Assessment: Tolerated treatment well  G LUE extension noted during therex, increase ataxia movement due to fatigue  Donning sandwich splint pt demo-ability to extend digits and wrist for extended amount of time when in WB  Patient would benefit from continued OT      Plan: Continued skilled OT per POC with possible fabrication of sandwich splint for pt to trial at home  INTERVENTION COMMENTS:  Diagnosis: I69 354  Precautions: CAD, Depression, Seizure (x1 occurring in 2014), HTN  FOTO: 60, with 90% limitation in Hand function and 37% limitation in UE function    60, with 54% limitation in UE function and 65% limitation in Hand function    70, with 54% limitation in UE function and 65% limitation in Hand function    71, with 48% limitations in UE function and 60% limitation in Hand function  Insurance: Payor: YENNI  REP / Plan: Yeison JOLLY  REP / Product Type: Medicare PPO /   6 of ____ visits, PN due 2019:  [id number]:1 neuro  [id number]  [id number]-TP

## 2019-02-06 ENCOUNTER — OFFICE VISIT (OUTPATIENT)
Dept: OCCUPATIONAL THERAPY | Facility: REHABILITATION | Age: 59
End: 2019-02-06
Payer: COMMERCIAL

## 2019-02-06 DIAGNOSIS — I69.354 HEMIPARESIS AFFECTING LEFT SIDE AS LATE EFFECT OF CEREBROVASCULAR ACCIDENT (HCC): Primary | ICD-10-CM

## 2019-02-06 PROCEDURE — 97110 THERAPEUTIC EXERCISES: CPT

## 2019-02-06 PROCEDURE — 97112 NEUROMUSCULAR REEDUCATION: CPT

## 2019-02-06 NOTE — PROGRESS NOTES
Daily Note     Today's date: 2019  Patient name: Alicia Kumar  : 1960  MRN: [id number]  Referring provider: Dahlia Baxter MD  Dx:   Encounter Diagnosis   Name Primary?  Hemiparesis affecting left side as late effect of cerebrovascular accident ([facility] [address] ) Yes                  Subjective: "I think this was a good session, look at how my hand stayed open"  Objective: See treatment diary below    Bioness for muscle facilitation followed by FF w/OH pulse w/3sec hold, 10x's, supine on mat  Seated using mirror for visual cue w/tactile cues for shoulder glides w/ AA in FF followed by UE ranger for HD add and FF     RUE  Quadriped w/card WB into LUE donning sandwich splint for wrist/digit extension during card match crossing midline utilizing RUE for card retrieval/placement  Assessment: Tolerated treatment well  Pt demo-G control of LUE and F symmetrical movements bin supine tween BLUE noted with FF >1/2 range with 3 sec pulse 10x's followed by elbow flex/ext, x10  Seated w/AA pt completed FF near 1/2range with min shoulder hike noted utilizing mirror as visual cue  UE ranger (AG) AROM in FF near 1/2 range  Pt in WB for about 10 min demo-G digit/wrist extension, pt issued sandwich splint to trial for carryover at home  Patient would benefit from continued OT      Plan: Continued skilled OT per POC    INTERVENTION COMMENTS:  Diagnosis: I69 354  Precautions: CAD, Depression, Seizure (x1 occurring in 2014), HTN  FOTO: 60, with 90% limitation in Hand function and 37% limitation in UE function    60, with 54% limitation in UE function and 65% limitation in Hand function    70, with 54% limitation in UE function and 65% limitation in Hand function    71, with 48% limitations in UE function and 60% limitation in Hand function  Insurance: Payor: YENNI MUHAMMAD REP / Plan: Zackary JOLLY  REP / Product Type: Medicare PPO /   6 of ____ visits, PN due 2019

## 2019-02-12 ENCOUNTER — APPOINTMENT (OUTPATIENT)
Dept: OCCUPATIONAL THERAPY | Facility: REHABILITATION | Age: 59
End: 2019-02-12
Payer: COMMERCIAL

## 2019-02-13 ENCOUNTER — OFFICE VISIT (OUTPATIENT)
Dept: OCCUPATIONAL THERAPY | Facility: REHABILITATION | Age: 59
End: 2019-02-13
Payer: COMMERCIAL

## 2019-02-13 DIAGNOSIS — I69.354 HEMIPARESIS AFFECTING LEFT SIDE AS LATE EFFECT OF CEREBROVASCULAR ACCIDENT (HCC): Primary | ICD-10-CM

## 2019-02-13 PROCEDURE — 97112 NEUROMUSCULAR REEDUCATION: CPT

## 2019-02-13 NOTE — PROGRESS NOTES
Daily Note     Today's date: 2019  Patient name: Ciera Muniz  : 1960  MRN: [id number]  Referring provider: Ana Stone MD  Dx:   Encounter Diagnosis   Name Primary?  Hemipares[facility] affecting left side as late effect [facility] Yes                  Subjective: "I could move my arm real good the other night, I could feel it pop in the socket"  Objective: See treatment diary below    BIONESS followed by seated therex using dowel bar focusing on B/L integration, tone reduction, strengthening and endurance, MMP to low surface w/protractopn/retraction, 2x10, fwd and bwd rows, 2x10  BB toss focusing on grasp/release using L-hand     Assessment: Tolerated treatment well  Pt demo-F symmetrical movements during therex, decrease in shoulder hike noted  Pt continues to present with decrease abilities to extend digits during open hand w/D 4&5 extended slightly w/LUE in full extension in neutral   Patient would benefit from continued OT      Plan: Continued skilled OT per POC     INTERVENTION COMMENTS:  Diagnosis: I69 354  Precautions: CAD, Depression, Seizure (x1 occurring in 2014), HTN  FOTO: 60, with 90% limitation in Hand function and 37% limitation in UE function    60, with 54% limitation in UE function and 65% limitation in Hand function    70, with 54% limitation in UE function and 65% limitation in Hand function    71, with 48% limitations in UE function and 60% limitation in Hand function  Insurance: Payor: YENNI MUHAMMAD REP / Plan: Rosaura JOLLY  REP / Product Type: Medicare PPO /   7 of ____ visits, PN due 2019:  [id number]:1 neuro  [id number]

## 2019-02-15 ENCOUNTER — EVALUATION (OUTPATIENT)
Dept: OCCUPATIONAL THERAPY | Facility: REHABILITATION | Age: 59
End: 2019-02-15
Payer: COMMERCIAL

## 2019-02-15 DIAGNOSIS — I69.354 HEMIPARESIS AFFECTING LEFT SIDE AS LATE EFFECT OF CEREBROVASCULAR ACCIDENT (HCC): Primary | ICD-10-CM

## 2019-02-15 PROCEDURE — 97112 NEUROMUSCULAR REEDUCATION: CPT | Performed by: OCCUPATIONAL THERAPIST

## 2019-02-15 NOTE — LETTER
February 21, 2019    Kimberly Rasmussen MD  [address] [address]    Patient: Lito Hill   YOB: 1960   Date of Visit: 2/15/2019     Encounter Diagnosis     ICD-10-CM    1  Hemiparesis affecting left side as late effect of cerebrovascular accident [facility]) [id number]        Dear Dr Kendra Cross:    Please review the attached Plan of Care from Shyla Gutierrez recent visit  Please verify that you agree therapy should continue by signing the attached document and sending it back to our office  If you have any questions or concerns, please don't hesitate to call  Sincerely,    Yris Aguillon OT      Referring Provider:     I certify that I have read the below Plan of Care and certify the need for these services furnished under this plan of treatment while under my care  Kimberly Rasmussen MD  [address] [address]  VIA In Basket            OCCUPATIONAL THERAPY RE- EVALUATION:    02/15/2019  Lito Hill  1960  [id number]  Alexandro Parra MD  No diagnosis found  Subjective Evaluation    Quality of life: good          "I feel like I am doing real good"  PATIENT GOAL: "I just want to improve the use of my hand  I want to be able to keep my hand opened more"    HISTORY OF PRESENT ILLNESS:     Pt is a 66 y o  male who was referred to Occupational Therapy s/p  Hemiparesis affecting L side as late effect of CVA  Pt reports having CVA in January 2014  Pt with initial symptoms of weakness on L side of body when in working environment  Pt was immediately taking to [address] [place] [address] with LOS x 1 month approximately and then was transferred to [facility] where Pt engaged in PT/OT services  Pt was then was D/Ankit to home environment and [facility]  Pt then transferred OT/PT services to [place] [address] Patient services    Pt reports having aide attend home environment every Monday for x 3 hours to complete laundry and mop/vacuum  Pt with history of x1 seizure in 2014; no seizure activity since  Pt reports noticing regression of functional use of LUE since D/Ankit from last therapy services  Pt with increased tone affecting L AROM  Pt reports no RHS worn  Pt lives in a single floor apartment on 6th floor with elevator access with son  Pt mod I with all ADLs at this time  Pt required aide to assist with IADLs, though reports completing cooking, dishes, and garbage at mod I status  Pt worked as a  prior to CVA-- Pt loss role of worker  Pt on LT disability at this time  Pt continues the role of  with no difficulties reported  PMH:   Past Medical History:   Diagnosis Date    CAD (coronary artery disease) 01/15/2014    100% occlusion of right    CVA (cerebral vascular accident) ([facility] [address] ) 01/15/2014    Depressive disorder     Diabetes ([facility] [address] )     Dyslipidemia     Erectile dysfunction 2009    Hemiparesis ([facility] [address] ) 01/15/2014    left    Hypertension     Seizure ([facility] [address] ) 2014    Stroke ([facility] [address] )          Pain Levels:     Restin    With Activity:  0    Objective     Functional Assessment        Comments  See impairment section for further details  Impairment Observations:  1  Decreased functional use of LUE  Gisela Prows Pt with self-report of increased functional use of LUE  Pt reports increased abilities to raise arm actively and unassistively to don deodrant  Pt reports improved abilities to utilize LUE for stabilize assist    2  Increased tone    Improvement evident  3  Max difficulties terminating flexors on command    improving  4  1/2 thumb size sublux L shoulder    remains  5  Decreased muscle endurance throughout LUE    Significant improvement  6  Exertional L tremors    Significant improvement evident  Dynamometer Position #2:   R: 65  75  75  78  76  L: 15  20  22  24   25  Pt is L hand dominant    Action:  3 point pinch: R 15 and L 5 5  Erin Nayak R 17, L 1  R 19, L 1  R 17 5, L 0  R 20, L 3  2 point pinch: R 11 and L 2  R 12, L 2 5  Lateral pinch: R 16 5 and L 10    R 20, L 8  R 18, L 10    R 18, L 7 15 5, L 7    9-hole Peg Test: Unable to perform 2* L hemiparesis  Erin Nayak remains      L AROM:  Shoulder elevation: full  Shoulder FF: near 1/4 with short lever and exertional tremors    1/4 with longer lever and reduced exertional tremors evident    Near 1/2 with short lever    1/2 with longer lever  Shoulder ABD: near 1/2 with short lever    1/4 with longer lever and exertional tremors evident    Near 1/2 with longer lever, though exertional tremors still evident    remains  ER: 1/2    Near 3/4    Near full with exertional tremors    Remains with less exertional tremors evident  IR: 3/4    full  Elbow ext: full  Elbow flex: near full    full  Sup: 1/2 with digits flexed    Near full with digits flexed   Pron: full  Wrist flex: 1/4    remains  Wrist ext: Neutral   Composite: 3/4 with thumb adducted    Near full with thumb adducted    Near full  Opposition: unable to perform 2* Max difficulties with terminating flexors on command    remains  Finger to nose: unable to peform    remains  Dysdiadochokinesia: unable to perform    remains    SUPINE L AROM:  Shoulder FF: 1/2 with long lever    Near 3/4 with long lever    3/4 with long lever  Shoulder ABD: 1/4 with long lever    1/2 with long lever    Near 1/2 with long lever    1/2 with long lever  Elbow ext: full  Elbow flex: 3/4    full  Sup: neutral  1/2    3/4  Erinbashir Nayak Near full with digits flexed  Pron: full  Tricep AG: 3/4 with difficulties terminating flexors on command 2* tone    Full with significant improved abilities to terminate flexors on command    Remains, with repetition required prior for tone reduction  Horizontal ABD: full  Horizontal ADD: near full    full    Finger size  sublux  Symmetrical upright seated posture B/L shoulders     Pt demo with fair + functional progression towards goals in POC  Pt with improved L AROM, emerging improved abilities to terminate flexors on command, increased muscle endurance, and improved overall functional use of LUE  OTR recommending Pt to continue skilled OT services 1-2x/week for 4 weeks to continue to focus on abilities to terminate flexors on command, increased muscle endurance, forward functional reach, open/close grasp functions to improve overall functional use of LUE  Assessment  Assessment details: See skilled analysis for further details  Skilled Analysis:  Pt is a 66 y o  male referred to Occupational Therapy s/p hemiparesis affecting L side as late effect of CVA  Pt presents with decreased functional use of LUE, Max difficulties terminating flexors on command affecting normal movement patterns, increased tone affecting L AROM, 1/2 thumb size sublux in L shoulder, exertional L tremors, and decreased L muscle endurance affecting engagement in meaningful occupations  Pt will benefit from skilled Occupational Therapy services 2x/week for 12 weeks with focus on neuro re-ed, RHS fabrication, manual tx, BIONESS (if cleared by neurologist--awaiting for clearance), and self-care management to increase functional use of LUE and for overall improved QOL       Short Term Goals:  - Pt will demo with decreased LUE hypertonicity for improved grasp release, termination of flexors on command  50% of time 4 weeks--- PARTIALLY MET  - Pt will increase LUE to functional assist with <20% cuing for tabletop tasks and engagement in salient tasks 4 weeks--  MET  - Pt will tolerate BIONESS for improved motor and sensory performance for overall improved hand to target with 80% accuracy 4 weeks-- PARTIALLY MET  - Pt will demo good carryover of clinic and home tone reduction strategies for improved L AROM initiation with functional reach, dressing, hygiene 4 weeks-- MET  - Pt will increase compliance with  RHS or C-Roll for improved LPS with  improved fit/decreased discomfort with wear time 4 weeks--  MET  - Pt will demo with G carryover of Home Exercise Program to improve functional progression towards goals in Plan of care and for improved functional use of LUE 4 weeks-- MET        Long Term Goals:  - Pt will tolerate RHS or C-Roll x 6-8 hours for tendon elongation and  decreased tone in left hand-- PARTIALLY MET  - Pt will demo with decreased hypertonicity of  L  UE to WNL for automatic grasp/ release  and functional reach with grooming-- NOT MET  - Pt will demo with decreased exertional tremors with functional reach for normalized movement pattern of  L  UE-- PARTIALLY MET  - Pt will resume  L  hand dominance to refined functional assist with all activities of daily living and engagement in salient tasks-- NOT MET  - Pt will demo with decreased hypertonicity of LUE to WNL for improved alternate motor patterns, termination on command for improved dressing/hygiene and engagement in salient tasks-- NOT MET        OTHER PLANNED THERAPY INTERVENTIONS:   Supine, seated, and in stance neuro re-ed  Tricep AG  BIONESS (received clearance by neurologist)  Manual tx  Seated functional reach: crossing midline  Supine place and hold  WBearing strategies  Approximate pad pinch         INTERVENTION COMMENTS:  Diagnosis: I69 354  Precautions: CAD, Depression, Seizure (x1 occurring in December 2014), HTN  FOTO: 60, with 90% limitation in Hand function and 37% limitation in UE function    60, with 54% limitation in UE function and 65% limitation in Hand function    70, with 54% limitation in UE function and 65% limitation in Hand function    71, with 48% limitations in UE function and 60% limitation in Hand function    64, with 49% limitation in UE function and 80% limitation in Hand function  Insurance: Payor: Cristiana Allen  REP / Plan: Rosaura Rizzo HYACINTH  REP / Product Type: Medicare PPO /   9 of ____ visits, PN due 03/15/2019

## 2019-02-15 NOTE — PROGRESS NOTES
OCCUPATIONAL THERAPY RE- EVALUATION:    02/15/2019  Lito Hill  1960  [id number]  Alexandro Parra MD  No diagnosis found  Subjective Evaluation    Quality of life: good          "I feel like I am doing real good"  PATIENT GOAL: "I just want to improve the use of my hand  I want to be able to keep my hand opened more"    HISTORY OF PRESENT ILLNESS:     Pt is a 66 y o  male who was referred to Occupational Therapy s/p  Hemiparesis affecting L side as late effect of CVA  Pt reports having CVA in January 2014  Pt with initial symptoms of weakness on L side of body when in working environment  Pt was immediately taking to [address] [place] [address] with LOS x 1 month approximately and then was transferred to [facility] where Pt engaged in PT/OT services  Pt was then was D/Ankit to home environment and participated in PT/OT services at [facility]  Pt then transferred OT/PT services to [place] [address] Patient services  Pt reports having aide attend home environment every Monday for x 3 hours to complete laundry and mop/vacuum  Pt with history of x1 seizure in December 2014; no seizure activity since  Pt reports noticing regression of functional use of LUE since D/Ankit from last therapy services  Pt with increased tone affecting L AROM  Pt reports no RHS worn  Pt lives in a single floor apartment on 6th floor with elevator access with son  Pt mod I with all ADLs at this time  Pt required aide to assist with IADLs, though reports completing cooking, dishes, and garbage at mod I status  Pt worked as a  prior to CVA-- Pt loss role of worker  Pt on LT disability at this time  Pt continues the role of  with no difficulties reported        PMH:   Past Medical History:   Diagnosis Date    CAD (coronary artery disease) 01/15/2014    100% occlusion of right    [facility]cere[facility] ) 01/15/2014    Depressive disorder  Diabetes ([facility] )     Dyslipidemia     Erectile dysfunction 2009    [facility] ) 01/15/2014    left    Hypertension     Seizure ([facility] ) 2014    Stroke ([place] [address] )          Pain Levels:     Restin    With Activity:  0    Objective     Functional Assessment        Comments  See impairment section for further details  Impairment Observations:  1  Decreased functional use of LUE  [place] Simple Pt with self-report of increased functional use of LUE  Pt reports increased abilities to raise arm actively and unassistively to don deodrant  Pt reports improved abilities to utilize LUE for stabilize assist    2  Increased tone    Improvement evident  3  Max difficulties terminating flexors on command    improving  4  1/2 thumb size sublux L shoulder    remains  5  Decreased muscle endurance throughout LUE    Significant improvement  6  Exertional L tremors    Significant improvement evident  Dynamometer Position #2:   R: 65  75  75  78  76  L: 15  20  22  24  25  Pt is L hand dominant    Action:  3 point pinch: R 15 and L 5 5  Jose R Simple Jose R Simple R 17, L 1  R 19, L 1  R 17 5, L 0  R 20, L 3  2 point pinch: R 11 and L 2  R 12, L 2 5  Lateral pinch: R 16 5 and L 10    R 20, L 8  R 18, L 10    R 18, L 7 15 5, L 7    9-hole Peg Test: Unable to perform 2* L hemiparesis  [place] Simple remains      L AROM:  Shoulder elevation: full  Shoulder FF: near 1/4 with short lever and exertional tremors    1/4 with longer lever and reduced exertional tremors evident    Near 1/2 with short lever    1/2 with longer lever  Shoulder ABD: near 1/2 with short lever    1/4 with longer lever and exertional tremors evident    Near 1/2 with longer lever, though exertional tremors still evident    remains  ER: 1/2    Near 3/4    Near full with exertional tremors    Remains with less exertional tremors evident  IR: 3/4    full  Elbow ext: full  Elbow flex: near full    full  Sup: 1/2 with digits flexed     Near full with digits flexed   Pron: full  Wrist flex: 1/4    remains  Wrist ext: Neutral   Composite: 3/4 with thumb adducted    Near full with thumb adducted    Near full  Opposition: unable to perform 2* Max difficulties with terminating flexors on command    remains  Finger to nose: unable to peform    remains  Dysdiadochokinesia: unable to perform    remains    SUPINE L AROM:  Shoulder FF: 1/2 with long lever    Near 3/4 with long lever    3/4 with long lever  Shoulder ABD: 1/4 with long lever    1/2 with long lever    Near 1/2 with long lever    1/2 with long lever  Elbow ext: full  Elbow flex: 3/4    full  Sup: neutral  1/2    3/4  Norman Heckler Near full with digits flexed  Pron: full  Tricep AG: 3/4 with difficulties terminating flexors on command 2* tone    Full with significant improved abilities to terminate flexors on command    Remains, with repetition required prior for tone reduction  Horizontal ABD: full  Horizontal ADD: near full    full    Finger size  sublux  Symmetrical upright seated posture B/L shoulders     Pt demo with fair + functional progression towards goals in POC  Pt with improved L AROM, emerging improved abilities to terminate flexors on command, increased muscle endurance, and improved overall functional use of LUE  OTR recommending Pt to continue skilled OT services 1-2x/week for 4 weeks to continue to focus on abilities to terminate flexors on command, increased muscle endurance, forward functional reach, open/close grasp functions to improve overall functional use of LUE  Assessment  Assessment details: See skilled analysis for further details  Skilled Analysis:  Pt is a 66 y o  male referred to Occupational Therapy s/p hemiparesis affecting L side as late effect of CVA    Pt presents with decreased functional use of LUE, Max difficulties terminating flexors on command affecting normal movement patterns, increased tone affecting L AROM, 1/2 thumb size sublux in L shoulder, exertional L tremors, and decreased L muscle endurance affecting engagement in meaningful occupations  Pt will benefit from skilled Occupational Therapy services 2x/week for 12 weeks with focus on neuro re-ed, RHS fabrication, manual tx, BIONESS (if cleared by neurologist--awaiting for clearance), and self-care management to increase functional use of LUE and for overall improved QOL       Short Term Goals:  - Pt will demo with decreased LUE hypertonicity for improved grasp release, termination of flexors on command  50% of time 4 weeks--- PARTIALLY MET  - Pt will increase LUE to functional assist with <20% cuing for tabletop tasks and engagement in salient tasks 4 weeks--  MET  - Pt will tolerate BIONESS for improved motor and sensory performance for overall improved hand to target with 80% accuracy 4 weeks-- PARTIALLY MET  - Pt will demo good carryover of clinic and home tone reduction strategies for improved L AROM initiation with functional reach, dressing, hygiene 4 weeks-- MET  - Pt will increase compliance with  RHS or C-Roll for improved LPS with  improved fit/decreased discomfort with wear time 4 weeks--  MET  - Pt will demo with G carryover of Home Exercise Program to improve functional progression towards goals in Plan of care and for improved functional use of LUE 4 weeks-- MET        Long Term Goals:  - Pt will tolerate RHS or C-Roll x 6-8 hours for tendon elongation and  decreased tone in left hand-- PARTIALLY MET  - Pt will demo with decreased hypertonicity of  L  UE to WNL for automatic grasp/ release  and functional reach with grooming-- NOT MET  - Pt will demo with decreased exertional tremors with functional reach for normalized movement pattern of  L  UE-- PARTIALLY MET  - Pt will resume  L  hand dominance to refined functional assist with all activities of daily living and engagement in salient tasks-- NOT MET  - Pt will demo with decreased hypertonicity of LUE to WNL for improved alternate motor patterns, termination on command for improved dressing/hygiene and engagement in salient tasks-- NOT MET        OTHER PLANNED THERAPY INTERVENTIONS:   Supine, seated, and in stance neuro re-ed  Tricep AG  BIONESS (received clearance by neurologist)  Manual tx  Seated functional reach: crossing midline  Supine place and hold  WBearing strategies  Approximate pad pinch         INTERVENTION COMMENTS:  Diagnosis: I69 354  Precautions: CAD, Depression, Seizure (x1 occurring in December 2014), HTN  FOTO: 60, with 90% limitation in Hand function and 37% limitation in UE function    60, with 54% limitation in UE function and 65% limitation in Hand function    70, with 54% limitation in UE function and 65% limitation in Hand function    71, with 48% limitations in UE function and 60% limitation in Hand function    64, with 49% limitation in UE function and 80% limitation in Hand function  Insurance: Payor: Cristiana Allen  REP / Plan: Claudia JOLLY  REP / Product Type: Medicare PPO /   9 of ____ visits, PN due 03/15/2019

## 2019-02-19 ENCOUNTER — OFFICE VISIT (OUTPATIENT)
Dept: OCCUPATIONAL THERAPY | Facility: REHABILITATION | Age: 59
End: 2019-02-19
Payer: COMMERCIAL

## 2019-02-19 DIAGNOSIS — I69.354 HEMIPARESIS AFFECTING LEFT SIDE AS LATE EFFECT OF CEREBROVASCULAR ACCIDENT (HCC): Primary | ICD-10-CM

## 2019-02-19 PROCEDURE — 97110 THERAPEUTIC EXERCISES: CPT

## 2019-02-19 PROCEDURE — 97112 NEUROMUSCULAR REEDUCATION: CPT

## 2019-02-19 NOTE — PROGRESS NOTES
Daily Note     Today's date: 2019  Patient name: Tita Rain  : 1960  MRN: [id number]  Referring provider: Landy Khan MD  Dx:   Encounter Diagnosis   Name Primary?  Hemiparesis affecting left side as late effect of cerebrovascular accident ([facility] ) Yes                  Subjective: "I cant wait till its warmer outside, I'm going to go to the park to walk and I can carry a weight"  Objective: See treatment diary below    Bioness on neuro mode for 10 min followed by open hand mode for 10 min as preparatory to functional task facilitating open/close hand w/teminationion of flexors on command  WB into LUE in stance completing functional task placed to left increasing WB demo-G wrist and digit extension  Holding 3# DB in left hand creating longer lever facilitating supination, > 1/2 range  Moving large velcro rollers in forward motion with digits extended near 1/2 range utilizing shoulder FF, no noted trunk compensation this session  Assessment: Tolerated treatment well  Patient would benefit from continued OT      Plan: Continued skilled OT per POC with focus on functional movement of L hand  INTERVENTION COMMENTS:  Diagnosis: I69 354  Precautions: CAD, Depression, Seizure (x1 occurring in 2014), HTN  FOTO: 60, with 90% limitation in Hand function and 37% limitation in UE function    60, with 54% limitation in UE function and 65% limitation in Hand function    70, with 54% limitation in UE function and 65% limitation in Hand function    71, with 48% limitations in UE function and 60% limitation in Hand function    64, with 49% limitation in UE function and 80% limitation in Hand function  Insurance: Payor: Cristiana Allen  REP / Plan: Joya Green PFFS  REP / Product Type: Medicare PPO /   1 of 10 visits, PN due 03/15/2019

## 2019-02-20 ENCOUNTER — APPOINTMENT (OUTPATIENT)
Dept: OCCUPATIONAL THERAPY | Facility: REHABILITATION | Age: 59
End: 2019-02-20
Payer: COMMERCIAL

## 2019-02-22 ENCOUNTER — TELEPHONE (OUTPATIENT)
Dept: FAMILY MEDICINE CLINIC | Facility: CLINIC | Age: 59
End: 2019-02-22

## 2019-02-22 NOTE — TELEPHONE ENCOUNTER
Form that was completed for patient diabetic supplies needs to have the dx code on the form they need it completed and re faxed to them at [phone]  Any ?  Call [phone]  Form was scanned in patients chart  TY

## 2019-02-26 ENCOUNTER — APPOINTMENT (OUTPATIENT)
Dept: OCCUPATIONAL THERAPY | Facility: REHABILITATION | Age: 59
End: 2019-02-26
Payer: COMMERCIAL

## 2019-02-27 ENCOUNTER — APPOINTMENT (OUTPATIENT)
Dept: OCCUPATIONAL THERAPY | Facility: REHABILITATION | Age: 59
End: 2019-02-27
Payer: COMMERCIAL

## 2019-03-04 DIAGNOSIS — E11.9 DIABETES MELLITUS WITHOUT COMPLICATION (HCC): Primary | ICD-10-CM

## 2019-03-05 ENCOUNTER — OFFICE VISIT (OUTPATIENT)
Dept: OCCUPATIONAL THERAPY | Facility: REHABILITATION | Age: 59
End: 2019-03-05
Payer: COMMERCIAL

## 2019-03-05 DIAGNOSIS — I69.354 HEMIPARESIS AFFECTING LEFT SIDE AS LATE EFFECT OF CEREBROVASCULAR ACCIDENT (HCC): Primary | ICD-10-CM

## 2019-03-05 PROCEDURE — 97112 NEUROMUSCULAR REEDUCATION: CPT

## 2019-03-05 NOTE — PROGRESS NOTES
Daily Note     Today's date: 3/5/2019  Patient name: Vince Pelletier  : 1960  MRN: [id number]  Referring provider: Graciela Aguilera MD  Dx:   Encounter Diagnosis   Name Primary?  Hemiparesis affecting left side as late effect of cerebrovascular accident ([facility] ) Yes                  Subjective: "I can't grasp the peg so I can see the hole in the board "      Objective: See treatment diary below  BIONESS on neuro re-ed setting  Peg retrieval from pegboard to facilitate funttional grasp/release/reach  Pt able to grasp and place peg in pegboard, pt  Continues to demo Max difficulty to terminate flexors  Pt  attempted to place open hand on weighted ball to facilitate weight bearing, and digit/wrist extension  Due to increased digit flexion, pt unable to maintain open hand when placed on ball  Educated pt on shoulder ROM exercises for carryover at home  Assessment: Tolerated treatment well  Patient would benefit from continued OT      Plan: Continued skilled OT per POC     INTERVENTION COMMENTS:  Diagnosis: I69 354  Precautions: CAD, Depression, Seizure (x1 occurring in 2014), HTN  FOTO: 60, with 90% limitation in Hand function and 37% limitation in UE function    60, with 54% limitation in UE function and 65% limitation in Hand function    70, with 54% limitation in UE function and 65% limitation in Hand function    71, with 48% limitations in UE function and 60% limitation in Hand function    64, with 49% limitation in UE function and 80% limitation in Hand function  Insurance: Payor: YENNI MUHAMMAD REP / Plan: Enrico Erickson [facility] REP / Product Type: Medicare PPO /   1 of 10 visits, PN due 03/15/2019     03/05  [id number]  [id number]

## 2019-03-07 ENCOUNTER — APPOINTMENT (OUTPATIENT)
Dept: OCCUPATIONAL THERAPY | Facility: REHABILITATION | Age: 59
End: 2019-03-07
Payer: COMMERCIAL

## 2019-03-12 ENCOUNTER — OFFICE VISIT (OUTPATIENT)
Dept: OCCUPATIONAL THERAPY | Facility: REHABILITATION | Age: 59
End: 2019-03-12
Payer: COMMERCIAL

## 2019-03-12 DIAGNOSIS — I69.354 HEMIPARESIS AFFECTING LEFT SIDE AS LATE EFFECT OF CEREBROVASCULAR ACCIDENT (HCC): Primary | ICD-10-CM

## 2019-03-12 PROCEDURE — 97112 NEUROMUSCULAR REEDUCATION: CPT

## 2019-03-12 PROCEDURE — 97140 MANUAL THERAPY 1/> REGIONS: CPT

## 2019-03-12 NOTE — PROGRESS NOTES
Daily Note     Today's date: 3/12/2019  Patient name: Carlee Booth  : 1960  MRN: [id number]  Referring provider: Renita Calabrese MD  Dx:   Encounter Diagnosis   Name Primary?  Hemiparesis affecting left side as late effect of cerebrovascular accident ([facility] ) Yes                  Subjective: "I put my left hand on the steering wheel, not by itself you know, but I could control it"  Objective: See treatment diary below    Bioness for 10 min on neuro re-ed mode followed by open hand mode for 10 min and IASTM focusing on muscle facilitation, tone reduction and digit extension  3lb dumbell focusing on proper positioning of exercise to facilitate supination, >3/4 range  Education and demonstration re: modified push up WB in quadruped focusing on digit/wrist extension and LUE strengthening  Pt completed 5 modified push ups demo-digit/wrist extension in quadruped, pt able to sustain 2/5 push ups with hand in extension  Assessment: Tolerated treatment well  Patient would benefit from continued OT      Plan: Continued skilled OT per POC     INTERVENTION COMMENTS:  Diagnosis: I69 354  Precautions: CAD, Depression, Seizure (x1 occurring in 2014), HTN  FOTO: 60, with 90% limitation in Hand function and 37% limitation in UE function    60, with 54% limitation in UE function and 65% limitation in Hand function    70, with 54% limitation in UE function and 65% limitation in Hand function    71, with 48% limitations in UE function and 60% limitation in Hand function    64, with 49% limitation in UE function and 80% limitation in Hand function  Insurance: Payor: [facility] ARSALAN REP / Plan: Tiarra Jefferson [facility] / Product Type: Medicare PPO Kavita Woodward  6 UP 34 WRTEMP, PN due 03/15/2019

## 2019-03-14 ENCOUNTER — APPOINTMENT (OUTPATIENT)
Dept: OCCUPATIONAL THERAPY | Facility: REHABILITATION | Age: 59
End: 2019-03-14
Payer: COMMERCIAL

## 2019-03-15 ENCOUNTER — EVALUATION (OUTPATIENT)
Dept: OCCUPATIONAL THERAPY | Facility: REHABILITATION | Age: 59
End: 2019-03-15
Payer: COMMERCIAL

## 2019-03-15 DIAGNOSIS — I69.354 HEMIPARESIS AFFECTING LEFT SIDE AS LATE EFFECT OF CEREBROVASCULAR ACCIDENT (HCC): Primary | ICD-10-CM

## 2019-03-15 PROCEDURE — 97112 NEUROMUSCULAR REEDUCATION: CPT | Performed by: OCCUPATIONAL THERAPIST

## 2019-03-15 NOTE — PROGRESS NOTES
OCCUPATIONAL THERAPY DISCHARGE SUMMARY:     03/15/2019  [facility]  1960  [id number]  Dahlia Baxter MD  No diagnosis found  Subjective Evaluation    Quality of life: good          "I am beginning to think with my R side of my brain now"  PATIENT GOAL: "I just want to improve the use of my hand  I want to be able to keep my hand opened more"    HISTORY OF PRESENT ILLNESS:     Pt is a 66 y o  male who was referred to Occupational Therapy s/p  Hemiparesis affecting L side as late effect of CVA  Pt reports having CVA in January 2014  Pt with initial symptoms of weakness on L side of body when in working environment  Pt was immediately taking to [address] [place] [address] with LOS x 1 month approximately and then was transferred to [facility] where Pt engaged in PT/OT services  Pt was then was D/Ankit to home environment and participated in PT/OT services at [facility] Patient  Pt then transferred OT/PT services to [place] [address] Patient services  Pt reports having aide attend home environment every Monday for x 3 hours to complete laundry and mop/vacuum  Pt with history of x1 seizure in December 2014; no seizure activity since  Pt reports noticing regression of functional use of LUE since D/Ankit from last therapy services  Pt with increased tone affecting L AROM  Pt reports no RHS worn  Pt lives in a single floor apartment on 6th floor with elevator access with son  Pt mod I with all ADLs at this time  Pt required aide to assist with IADLs, though reports completing cooking, dishes, and garbage at mod I status  Pt worked as a  prior to CVA-- Pt loss role of worker  Pt on LT disability at this time  Pt continues the role of  with no difficulties reported        PMH:   Past Medical History:   Diagnosis Date    CAD (coronary artery disease) 01/15/2014    100% occlusion of right    [facility]A (cerebral vascular accident) ([facility] ) 01/15/2014    Depressive disorder     Diabetes ([facility] )     Dyslipidemia     Erectile dysfunction 2009    Hemiparesis ([facility] ) 01/15/2014    left    Hypertension     Seizure ([facility] ) 2014    Stroke ([place] [address] )          Pain Levels:     Restin    With Activity:  0    Objective     Functional Assessment        Comments  See impairment section for further details  Impairment Observations:  1  Decreased functional use of LUE  Mireille Deejay Pt with self-report of increased functional use of LUE  Pt reports increased abilities to raise arm actively and unassistively to don deodrant  Pt reports improved abilities to utilize LUE for stabilize assist    2  Increased tone    Improvement evident  3  Max difficulties terminating flexors on command    improving  4  1/2 thumb size sublux L shoulder    remains  5  Decreased muscle endurance throughout LUE    Significant improvement  6  Exertional L tremors    Significant improvement evident  Dynamometer Position #2:   R: 65  75  75  78  76  64  L: 15  20  22  24  25  26  Pt is L hand dominant    Action:  3 point pinch: R 15 and L 5 5  Mireille Deejay Mireille Deejay R 17, L 1  R 19, L 1  R 17 5, L 0  R 20, L 3  R 16, L 0  2 point pinch: R 11 and L 2  R 12, L 2 5  R 10, L 1 5  Lateral pinch: R 16 5 and L 10    R 20, L 8  R 18, L 10    R 18, L 7 15 5, L 7  R 19, L 10 5    9-hole Peg Test: Unable to perform 2* L hemiparesis  Mireille Deejay remains      L AROM:  Shoulder elevation: full  Shoulder FF: near 1/4 with short lever and exertional tremors    1/4 with longer lever and reduced exertional tremors evident    Near 1/2 with short lever    1/2 with longer lever  Shoulder ABD: near 1/2 with short lever    1/4 with longer lever and exertional tremors evident    Near 1/2 with longer lever, though exertional tremors still evident    remains  ER: 1/2    Near 3/4    3/4  IR: 3/4    full  Elbow ext: full  Elbow flex: near full    full  Sup: 1/2 with digits flexed     Near full with digits flexed Pron: full  Wrist flex: neutral  Wrist ext: 1/4  Composite: 3/4 with thumb adducted    Near full with thumb adducted    Near full with thumb adducted  Opposition: unable to perform 2* Max difficulties with terminating flexors on command    remains  Finger to nose: unable to peform    remains  Dysdiadochokinesia: unable to perform    remains    SUPINE L AROM:  Shoulder FF: 1/2 with long lever    Near 3/4 with long lever    3/4 with long lever  Shoulder ABD: 1/4 with long lever    1/2 with long lever    Near 1/2 with long lever    1/2 with long lever  Elbow ext: full  Elbow flex: 3/4    full  Sup: neutral  1/2    3/4    neutral  Pron: full  Tricep AG: 3/4 with difficulties terminating flexors on command 2* tone    Full with significant improved abilities to terminate flexors on command    Remains, with repetition required prior for tone reduction  Horizontal ABD: full  Horizontal ADD: near full    full    Finger size  sublux  Symmetrical upright seated posture B/L shoulders     Pt demo with fair functional progression towards goals in POC  Pt continues to maintain functional progress achieved, although no further progression is being achieved at this time  OTR will be D/Cing Pt at this time 2* maximal level reached  OTR educated Pt on the importance of continuing HEP and tone reduction strategies in home environment  Assessment  Assessment details: See skilled analysis for further details  Skilled Analysis:  Pt is a 66 y o  male referred to Occupational Therapy s/p hemiparesis affecting L side as late effect of CVA  Pt presents with decreased functional use of LUE, Max difficulties terminating flexors on command affecting normal movement patterns, increased tone affecting L AROM, 1/2 thumb size sublux in L shoulder, exertional L tremors, and decreased L muscle endurance affecting engagement in meaningful occupations    Pt will benefit from skilled Occupational Therapy services 2x/week for 12 weeks with focus on neuro re-ed, RHS fabrication, manual tx, BIONESS (if cleared by neurologist--awaiting for clearance), and self-care management to increase functional use of LUE and for overall improved QOL       Short Term Goals:  - Pt will demo with decreased LUE hypertonicity for improved grasp release, termination of flexors on command  50% of time 4 weeks--- PARTIALLY MET  - Pt will increase LUE to functional assist with <20% cuing for tabletop tasks and engagement in salient tasks 4 weeks--  MET  - Pt will tolerate BIONESS for improved motor and sensory performance for overall improved hand to target with 80% accuracy 4 weeks--  MET  - Pt will demo good carryover of clinic and home tone reduction strategies for improved L AROM initiation with functional reach, dressing, hygiene 4 weeks-- MET  - Pt will increase compliance with  RHS or C-Roll for improved LPS with  improved fit/decreased discomfort with wear time 4 weeks--  MET  - Pt will demo with G carryover of Home Exercise Program to improve functional progression towards goals in Plan of care and for improved functional use of LUE 4 weeks-- MET        Long Term Goals:  - Pt will tolerate RHS or C-Roll x 6-8 hours for tendon elongation and  decreased tone in left hand-- MET  - Pt will demo with decreased hypertonicity of  L  UE to WNL for automatic grasp/ release  and functional reach with grooming-- NOT MET  - Pt will demo with decreased exertional tremors with functional reach for normalized movement pattern of  L  UE-- PARTIALLY MET  - Pt will resume  L  hand dominance to refined functional assist with all activities of daily living and engagement in salient tasks-- NOT MET  - Pt will demo with decreased hypertonicity of LUE to WNL for improved alternate motor patterns, termination on command for improved dressing/hygiene and engagement in salient tasks-- NOT MET        OTHER PLANNED THERAPY INTERVENTIONS:   Supine, seated, and in stance neuro re-ed  Tricep AG  BIONESS (received clearance by neurologist)  Manual tx  Seated functional reach: crossing midline  Supine place and hold  WBearing strategies  Approximate pad pinch         INTERVENTION COMMENTS:  Diagnosis: I69 354  Precautions: CAD, Depression, Seizure (x1 occurring in December 2014), HTN  FOTO: 70, with 46% limitation in UE function and 70% limitation in Hand function  Insurance: Payor: Cristiana Allen  REP / Plan: Bridget JOLLY  REP / Product Type: Medicare PPO /   3 of ____ visits

## 2019-03-15 NOTE — LETTER
March 18, 2019    Flaca Ledesma MD  [address] [address]    Patient: Carolyn Hudson   YOB: 1960   Date of Visit: 3/15/2019     Encounter Diagnosis     ICD-10-CM    1  Hemiparesis affecting left side as late effect of cerebrovascular accident [facility]) [id number]        Dear Dr Clive Singh:    Please review the attached Plan of Care from Zoey Betina recent visit  Please verify that you agree therapy should continue by signing the attached document and sending it back to our office  If you have any questions or concerns, please don't hesitate to call  Sincerely,    Pauline Dent OT      Referring Provider:     I certify that I have read the below Plan of Care and certify the need for these services furnished under this plan of treatment while under my care  Flaca Ledesma MD  [address]: [phone]            OCCUPATIONAL THERAPY DISCHARGE SUMMARY:     03/15/2019  Carolyn Hudson  1960  [id number]  Maria M Angeles MD  No diagnosis found  Subjective Evaluation    Quality of life: good          "I am beginning to think with my R side of my brain now"  PATIENT GOAL: "I just want to improve the use of my hand  I want to be able to keep my hand opened more"    HISTORY OF PRESENT ILLNESS:     Pt is a 66 y o  male who was referred to Occupational Therapy s/p  Hemiparesis affecting L side as late effect of CVA  Pt reports having CVA in January 2014  Pt with initial symptoms of weakness on L side of body when in working environment  Pt was immediately taking to [address] [place] [address] with LOS x 1 month approximately and then was transferred to [facility] where Pt engaged in PT/OT services  Pt was then was D/Ankit to home environment and [facility]  [facility]    Pt reports having aide attend home environment every Monday for x 3 hours to complete laundry and mop/vacuum  Pt with history of x1 seizure in 2014; no seizure activity since  Pt reports noticing regression of functional use of LUE since D/Ankit from last therapy services  Pt with increased tone affecting L AROM  Pt reports no RHS worn  Pt lives in a single floor apartment on 6th floor with elevator access with son  Pt mod I with all ADLs at this time  Pt required aide to assist with IADLs, though reports completing cooking, dishes, and garbage at mod I status  Pt worked as a  prior to CVA-- Pt loss role of worker  Pt on LT disability at this time  Pt continues the role of  with no difficulties reported  PMH:   Past Medical History:   Diagnosis Date    CAD (coronary artery disease) 01/15/2014    100% occlusion of right    [facility]A [facility]cerebral vascular [facility]) [facility] ) 01/15/2014    Depressive disorder     Diabetes ([facility] )     Dyslipidemia     Erectile dysfunction 2009    Hemiparesis ([facility] ) 01/15/2014    left    Hypertension     Seizure ([facility] ) 2014    Stroke ([facility] )          Pain Levels:     Restin    With Activity:  0    Objective     Functional Assessment        Comments  See impairment section for further details  Impairment Observations:  1  Decreased functional use of LUE  Jenna Burow Pt with self-report of increased functional use of LUE  Pt reports increased abilities to raise arm actively and unassistively to don deodrant  Pt reports improved abilities to utilize LUE for stabilize assist    2  Increased tone    Improvement evident  3  Max difficulties terminating flexors on command    improving  4  1/2 thumb size sublux L shoulder    remains  5  Decreased muscle endurance throughout LUE    Significant improvement  6  Exertional L tremors    Significant improvement evident  Dynamometer Position #2:   R: 65  75  75  78  76  64  L: 15  20  22  24    25  26  Pt is L hand dominant    Action:  3 point pinch: R 15 and L 5 5  Jessica Cary Jessica Alland R 17, L 1  R 19, L 1  R 17 5, L 0  R 20, L 3  R 16, L 0  2 point pinch: R 11 and L 2  R 12, L 2 5  R 10, L 1 5  Lateral pinch: R 16 5 and L 10    R 20, L 8  R 18, L 10    R 18, L 7 15 5, L 7  R 19, L 10 5    9-hole Peg Test: Unable to perform 2* L hemiparesis  Jessica Townsend remains      L AROM:  Shoulder elevation: full  Shoulder FF: near 1/4 with short lever and exertional tremors    1/4 with longer lever and reduced exertional tremors evident    Near 1/2 with short lever    1/2 with longer lever  Shoulder ABD: near 1/2 with short lever    1/4 with longer lever and exertional tremors evident    Near 1/2 with longer lever, though exertional tremors still evident    remains  ER: 1/2    Near 3/4    3/4  IR: 3/4    full  Elbow ext: full  Elbow flex: near full    full  Sup: 1/2 with digits flexed    Near full with digits flexed   Pron: full  Wrist flex: neutral  Wrist ext: 1/4  Composite: 3/4 with thumb adducted    Near full with thumb adducted    Near full with thumb adducted  Opposition: unable to perform 2* Max difficulties with terminating flexors on command    remains  Finger to nose: unable to peform    remains  Dysdiadochokinesia: unable to perform    remains    SUPINE L AROM:  Shoulder FF: 1/2 with long lever    Near 3/4 with long lever    3/4 with long lever  Shoulder ABD: 1/4 with long lever    1/2 with long lever    Near 1/2 with long lever    1/2 with long lever  Elbow ext: full  Elbow flex: 3/4    full  Sup: neutral  1/2    3/4    neutral  Pron: full  Tricep AG: 3/4 with difficulties terminating flexors on command 2* tone    Full with significant improved abilities to terminate flexors on command    Remains, with repetition required prior for tone reduction  Horizontal ABD: full  Horizontal ADD: near full    full    Finger size  sublux  Symmetrical upright seated posture B/L shoulders     Pt demo with fair functional progression towards goals in POC  Pt continues to maintain functional progress achieved, although no further progression is being achieved at this time  OTR will be D/Cing Pt at this time 2* maximal level reached  OTR educated Pt on the importance of continuing HEP and tone reduction strategies in home environment  Assessment  Assessment details: See skilled analysis for further details  Skilled Analysis:  Pt is a 66 y o  male referred to Occupational Therapy s/p hemiparesis affecting L side as late effect of CVA  Pt presents with decreased functional use of LUE, Max difficulties terminating flexors on command affecting normal movement patterns, increased tone affecting L AROM, 1/2 thumb size sublux in L shoulder, exertional L tremors, and decreased L muscle endurance affecting engagement in meaningful occupations  Pt will benefit from skilled Occupational Therapy services 2x/week for 12 weeks with focus on neuro re-ed, RHS fabrication, manual tx, BIONESS (if cleared by neurologist--awaiting for clearance), and self-care management to increase functional use of LUE and for overall improved QOL       Short Term Goals:  - Pt will demo with decreased LUE hypertonicity for improved grasp release, termination of flexors on command  50% of time 4 weeks--- PARTIALLY MET  - Pt will increase LUE to functional assist with <20% cuing for tabletop tasks and engagement in salient tasks 4 weeks--  MET  - Pt will tolerate BIONESS for improved motor and sensory performance for overall improved hand to target with 80% accuracy 4 weeks--  MET  - Pt will demo good carryover of clinic and home tone reduction strategies for improved L AROM initiation with functional reach, dressing, hygiene 4 weeks-- MET  - Pt will increase compliance with  RHS or C-Roll for improved LPS with  improved fit/decreased discomfort with wear time 4 weeks--  MET  - Pt will demo with G carryover of Home Exercise Program to improve functional progression towards goals in Plan of care and for improved functional use of LUE 4 weeks-- MET        Long Term Goals:  - Pt will tolerate RHS or C-Roll x 6-8 hours for tendon elongation and  decreased tone in left hand-- MET  - Pt will demo with decreased hypertonicity of  L  UE to WNL for automatic grasp/ release  and functional reach with grooming-- NOT MET  - Pt will demo with decreased exertional tremors with functional reach for normalized movement pattern of  L  UE-- PARTIALLY MET  - Pt will resume  L  hand dominance to refined functional assist with all activities of daily living and engagement in salient tasks-- NOT MET  - Pt will demo with decreased hypertonicity of LUE to WNL for improved alternate motor patterns, termination on command for improved dressing/hygiene and engagement in salient tasks-- NOT MET        OTHER PLANNED THERAPY INTERVENTIONS:   Supine, seated, and in stance neuro re-ed  Tricep AG  BIONESS (received clearance by neurologist)  Manual tx  Seated functional reach: crossing midline  Supine place and hold  WBearing strategies  Approximate pad pinch         INTERVENTION COMMENTS:  Diagnosis: [id number]  Precautions: CAD, Depression, Seizure (x1 occurring in December 2014), HTN  FOTO: 70, with 46% limitation in UE function and 70% limitation in Hand function  Insurance: Payor: Cristiana Allen  REP / Plan: Bran Cardoso PFFS  REP / Product Type: Medicare PPO /   3 of ____ visits

## 2019-03-18 ENCOUNTER — TRANSCRIBE ORDERS (OUTPATIENT)
Dept: PHYSICAL THERAPY | Facility: REHABILITATION | Age: 59
End: 2019-03-18

## 2019-03-18 DIAGNOSIS — I69.354 HEMIPARESIS AFFECTING LEFT SIDE AS LATE EFFECT OF CEREBROVASCULAR ACCIDENT (HCC): Primary | ICD-10-CM

## 2019-03-20 ENCOUNTER — APPOINTMENT (OUTPATIENT)
Dept: LAB | Facility: HOSPITAL | Age: 59
End: 2019-03-20
Payer: COMMERCIAL

## 2019-03-20 ENCOUNTER — TRANSCRIBE ORDERS (OUTPATIENT)
Dept: ADMINISTRATIVE | Facility: HOSPITAL | Age: 59
End: 2019-03-20

## 2019-03-20 DIAGNOSIS — G40.89 OTHER SEIZURES (HCC): ICD-10-CM

## 2019-03-20 LAB
ALBUMIN SERPL BCP-MCNC: 4.3 G/DL (ref 3–5.2)
ALP SERPL-CCNC: 61 U/L (ref 43–122)
ALT SERPL W P-5'-P-CCNC: 30 U/L (ref 9–52)
ANION GAP SERPL CALCULATED.3IONS-SCNC: 7 MMOL/L (ref 5–14)
AST SERPL W P-5'-P-CCNC: 20 U/L (ref 17–59)
BASOPHILS # BLD AUTO: 0.1 THOUSANDS/ΜL (ref 0–0.1)
BASOPHILS NFR BLD AUTO: 2 % (ref 0–1)
BILIRUB SERPL-MCNC: 0.4 MG/DL
BUN SERPL-MCNC: 12 MG/DL (ref 5–25)
CALCIUM SERPL-MCNC: 10 MG/DL (ref 8.4–10.2)
CHLORIDE SERPL-SCNC: 101 MMOL/L (ref 97–108)
CO2 SERPL-SCNC: 30 MMOL/L (ref 22–30)
CREAT SERPL-MCNC: 0.45 MG/DL (ref 0.7–1.5)
EOSINOPHIL # BLD AUTO: 0.2 THOUSAND/ΜL (ref 0–0.4)
EOSINOPHIL NFR BLD AUTO: 3 % (ref 0–6)
ERYTHROCYTE [DISTWIDTH] IN BLOOD BY AUTOMATED COUNT: 12.8 %
GFR SERPL CREATININE-BSD FRML MDRD: 127 ML/MIN/1.73SQ M
GLUCOSE P FAST SERPL-MCNC: 109 MG/DL (ref 70–99)
HCT VFR BLD AUTO: 44.4 % (ref 41–53)
HGB BLD-MCNC: 14.7 G/DL (ref 13.5–17.5)
LYMPHOCYTES # BLD AUTO: 1.5 THOUSANDS/ΜL (ref 0.5–4)
LYMPHOCYTES NFR BLD AUTO: 23 % (ref 25–45)
MCH RBC QN AUTO: 29.9 PG (ref 26–34)
MCHC RBC AUTO-ENTMCNC: 33.2 G/DL (ref 31–36)
MCV RBC AUTO: 90 FL (ref 80–100)
MONOCYTES # BLD AUTO: 0.4 THOUSAND/ΜL (ref 0.2–0.9)
MONOCYTES NFR BLD AUTO: 6 % (ref 1–10)
NEUTROPHILS # BLD AUTO: 4.4 THOUSANDS/ΜL (ref 1.8–7.8)
NEUTS SEG NFR BLD AUTO: 66 % (ref 45–65)
PLATELET # BLD AUTO: 330 THOUSANDS/UL (ref 150–450)
PMV BLD AUTO: 9.1 FL (ref 8.9–12.7)
POTASSIUM SERPL-SCNC: 4.5 MMOL/L (ref 3.6–5)
PROT SERPL-MCNC: 6.8 G/DL (ref 5.9–8.4)
RBC # BLD AUTO: 4.92 MILLION/UL (ref 4.5–5.9)
SODIUM SERPL-SCNC: 138 MMOL/L (ref 137–147)
WBC # BLD AUTO: 6.7 THOUSAND/UL (ref 4.5–11)

## 2019-03-20 PROCEDURE — 80177 DRUG SCRN QUAN LEVETIRACETAM: CPT

## 2019-03-20 PROCEDURE — 85025 COMPLETE CBC W/AUTO DIFF WBC: CPT

## 2019-03-20 PROCEDURE — 36415 COLL VENOUS BLD VENIPUNCTURE: CPT

## 2019-03-20 PROCEDURE — 80053 COMPREHEN METABOLIC PANEL: CPT

## 2019-03-22 ENCOUNTER — OFFICE VISIT (OUTPATIENT)
Dept: FAMILY MEDICINE CLINIC | Facility: CLINIC | Age: 59
End: 2019-03-22
Payer: COMMERCIAL

## 2019-03-22 VITALS
HEIGHT: 68 IN | HEART RATE: 100 BPM | BODY MASS INDEX: 28.81 KG/M2 | SYSTOLIC BLOOD PRESSURE: 128 MMHG | OXYGEN SATURATION: 97 % | DIASTOLIC BLOOD PRESSURE: 70 MMHG | TEMPERATURE: 97.3 F | WEIGHT: 190.1 LBS | RESPIRATION RATE: 20 BRPM

## 2019-03-22 DIAGNOSIS — F41.9 ANXIETY: ICD-10-CM

## 2019-03-22 DIAGNOSIS — F41.1 GENERALIZED ANXIETY DISORDER: Primary | ICD-10-CM

## 2019-03-22 DIAGNOSIS — F43.21 ADJUSTMENT DISORDER WITH DEPRESSED MOOD: ICD-10-CM

## 2019-03-22 PROBLEM — E11.59 TYPE 2 DIABETES MELLITUS WITH CIRCULATORY DISORDER, WITHOUT LONG-TERM CURRENT USE OF INSULIN (HCC): Status: ACTIVE | Noted: 2017-02-22

## 2019-03-22 LAB — LEVETIRACETAM SERPL-MCNC: 32.8 UG/ML (ref 10–40)

## 2019-03-22 PROCEDURE — 99213 OFFICE O/P EST LOW 20 MIN: CPT | Performed by: FAMILY MEDICINE

## 2019-03-22 PROCEDURE — 3725F SCREEN DEPRESSION PERFORMED: CPT | Performed by: FAMILY MEDICINE

## 2019-03-22 PROCEDURE — 3008F BODY MASS INDEX DOCD: CPT | Performed by: FAMILY MEDICINE

## 2019-03-22 RX ORDER — SERTRALINE HYDROCHLORIDE 25 MG/1
25 TABLET, FILM COATED ORAL DAILY
Qty: 90 TABLET | Refills: 3 | Status: SHIPPED | OUTPATIENT
Start: 2019-03-22 | End: 2020-03-26 | Stop reason: SDUPTHER

## 2019-03-22 RX ORDER — LORAZEPAM 1 MG/1
1 TABLET ORAL EVERY 6 HOURS PRN
Qty: 30 TABLET | Refills: 0 | Status: SHIPPED | OUTPATIENT
Start: 2019-03-22 | End: 2019-05-06 | Stop reason: SDUPTHER

## 2019-03-22 NOTE — PATIENT INSTRUCTIONS
Increase sertraline to 125 mg orally once a day  Limit the day lorazepam to only days when it is required for increased anxiety  You do not want to take the lorazepam every day on a regular basis because it can make depression worse  Call me if the increase in sertraline does not have a beneficial effect after about 3-4 weeks  Do not forget about exercise weight reduction and stop those cigars  Counseled patient on the importance of smoking cessation  Counseling time was over 3 minutes  Patient acknowledges an understanding of the risks of smoking  At the very least, patient is agreeable to try cutting down  Discussed outside services to help with quitting smoking  Discussed the specifics of using nicotine patches, on a tapering schedule,       after stopping smoking  Tapering can last 6 months  While on the nicotine patches, I instructed on the use of PRN     instead of reaching for a cigarette, in a moment of weakness  Heart Healthy Diet   WHAT YOU NEED TO KNOW:   A heart healthy diet is an eating plan low in total fat, unhealthy fats, and sodium (salt)  A heart healthy diet helps decrease your risk for heart disease and stroke  Limit the amount of fat you eat to 25% to 35% of your total daily calories  Limit sodium to less than 2,300 mg each day  DISCHARGE INSTRUCTIONS:   Healthy fats:  Healthy fats can help improve cholesterol levels  The risk for heart disease is decreased when cholesterol levels are normal  Choose healthy fats, such as the following:  · Unsaturated fat  is found in foods such as soybean, canola, olive, corn, and safflower oils  It is also found in soft tub margarine that is made with liquid vegetable oil  · Omega-3 fat  is found in certain fish, such as salmon, tuna, and trout, and in walnuts and flaxseed  Unhealthy fats:  Unhealthy fats can cause unhealthy cholesterol levels in your blood and increase your risk of heart disease   Limit unhealthy fats, such as the following:  · Cholesterol  is found in animal foods, such as eggs and lobster, and in dairy products made from whole milk  Limit cholesterol to less than 300 milligrams (mg) each day  You may need to limit cholesterol to 200 mg each day if you have heart disease  · Saturated fat  is found in meats, such as dorantes and hamburger  It is also found in chicken or turkey skin, whole milk, and butter  Limit saturated fat to less than 7% of your total daily calories  Limit saturated fat to less than 6% if you have heart disease or are at increased risk for it  · Trans fat  is found in packaged foods, such as potato chips and cookies  It is also in hard margarine, some fried foods, and shortening  Avoid trans fats as much as possible    Heart healthy foods and drinks to include:  Ask your dietitian or healthcare provider how many servings to have from each of the following food groups:  · Grains:      ¨ Whole-wheat breads, cereals, and pastas, and brown rice    ¨ Low-fat, low-sodium crackers and chips    · Vegetables:      ¨ Broccoli, green beans, green peas, and spinach    ¨ Collards, kale, and lima beans    ¨ Carrots, sweet potatoes, tomatoes, and peppers    ¨ Canned vegetables with no salt added    · Fruits:      ¨ Bananas, peaches, pears, and pineapple    ¨ Grapes, raisins, and dates    ¨ Oranges, tangerines, grapefruit, orange juice, and grapefruit juice    ¨ Apricots, mangoes, melons, and papaya    ¨ Raspberries and strawberries    ¨ Canned fruit with no added sugar    · Low-fat dairy products:      ¨ Nonfat (skim) milk, 1% milk, and low-fat almond, cashew, or soy milks fortified with calcium    ¨ Low-fat cheese, regular or frozen yogurt, and cottage cheese    · Meats and proteins , such as lean cuts of beef and pork (loin, leg, round), skinless chicken and turkey, legumes, soy products, egg whites, and nuts  Foods and drinks to limit or avoid:  Ask your dietitian or healthcare provider about these and other foods that are high in unhealthy fat, sodium, and sugar:  · Snack or packaged foods , such as frozen dinners, cookies, macaroni and cheese, and cereals with more than 300 mg of sodium per serving    · Canned or dry mixes  for cakes, soups, sauces, or gravies    · Vegetables with added sodium , such as instant potatoes, vegetables with added sauces, or regular canned vegetables    · Other foods high in sodium , such as ketchup, barbecue sauce, salad dressing, pickles, olives, soy sauce, and miso    · High-fat dairy foods  such as whole or 2% milk, cream cheese, or sour cream, and cheeses     · High-fat protein foods  such as high-fat cuts of beef (T-bone steaks, ribs), chicken or turkey with skin, and organ meats, such as liver    · Cured or smoked meats , such as hot dogs, dorantes, and sausage    · Unhealthy fats and oils , such as butter, stick margarine, shortening, and cooking oils such as coconut or palm oil    · Food and drinks high in sugar , such as soft drinks (soda), sports drinks, sweetened tea, candy, cake, cookies, pies, and doughnuts  Other diet guidelines to follow:   · Eat more foods containing omega-3 fats  Eat fish high in omega-3 fats at least 2 times a week  · Limit alcohol  Too much alcohol can damage your heart and raise your blood pressure  Women should limit alcohol to 1 drink a day  Men should limit alcohol to 2 drinks a day  A drink of alcohol is 12 ounces of beer, 5 ounces of wine, or 1½ ounces of liquor  · Choose low-sodium foods  High-sodium foods can lead to high blood pressure  Add little or no salt to food you prepare  Use herbs and spices in place of salt  · Eat more fiber  to help lower cholesterol levels  Eat at least 5 servings of fruits and vegetables each day  Eat 3 ounces of whole-grain foods each day  Legumes (beans) are also a good source of fiber  Lifestyle guidelines:   · Do not smoke    Nicotine and other chemicals in cigarettes and cigars can cause lung and heart damage  Ask your healthcare provider for information if you currently smoke and need help to quit  E-cigarettes or smokeless tobacco still contain nicotine  Talk to your healthcare provider before you use these products  · Exercise regularly  to help you maintain a healthy weight and improve your blood pressure and cholesterol levels  Ask your healthcare provider about the best exercise plan for you  Do not start an exercise program without asking your healthcare provider  Follow up with your healthcare provider as directed:  Write down your questions so you remember to ask them during your visits  © 2017 [employer] Information is for End User's use only and may not be sold, redistributed or otherwise used for commercial purposes  All illustrations and images included in CareNotes® are the copyrighted property of [employer]  or Emanuel Grullon  The above information is an  only  It is not intended as medical advice for individual conditions or treatments  Talk to your doctor, nurse or pharmacist before following any medical regimen to see if it is safe and effective for you

## 2019-03-22 NOTE — PROGRESS NOTES
Assessment/Plan:    Generalized anxiety disorder  Only uses the lorazepam PRN  Diagnoses and all orders for this visit:    Generalized anxiety disorder  -     LORazepam (ATIVAN) 1 mg tablet; Take 1 tablet (1 mg total) by mouth every 6 (six) hours as needed for anxiety    Anxiety    Adjustment disorder with depressed mood  -     sertraline (ZOLOFT) 25 mg tablet; Take 1 tablet (25 mg total) by mouth daily          Subjective:     Chief Complaint   Patient presents with    Anxiety    Depression        Patient ID: Chet Moscoso is a 66 y o  male  Since 11/20/2018 he has moved into a new apartment with his 6year-old son  They are getting by quite well but her on a tight budget  Patient's son broke his cellphone last month and it cost him over 200 dollars and that really put a dent in the budget  He does feel depressed at times and has depressed mood on most days  His energy levels are okay and 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  He also has no issues with sleep at night  There are times when he has anxiety and he ran out of lorazepam over a month ago  He just had his neurologic appointment with Dr Sue Longo and was sent for blood work and a carotid duplex scan but those are not yet available to me  The following portions of the patient's history were reviewed and updated as appropriate: allergies, current medications, past family history, past medical history, past social history, past surgical history and problem list     Review of Systems   Constitutional: Negative for activity change, appetite change, fatigue, fever and unexpected weight change  HENT: Negative for congestion, dental problem and sneezing  Eyes: Negative for discharge and visual disturbance  Respiratory: Negative for cough and wheezing  Gastrointestinal: Negative for abdominal pain, constipation, diarrhea, nausea and vomiting  Endocrine: Negative for polydipsia and polyuria  Genitourinary: Negative for dysuria and frequency  Musculoskeletal: Negative for arthralgias  Skin: Negative for rash  Allergic/Immunologic: Negative for environmental allergies and food allergies  Neurological: Negative for headaches  Hematological: Negative for adenopathy  Psychiatric/Behavioral: Negative for behavioral problems and sleep disturbance  Objective:  Vitals:    03/22/19 0855   BP: 128/70   BP Location: Right arm   Patient Position: Sitting   Cuff Size: Standard   Pulse: 100   Resp: 20   Temp: (!) 97 3 °F (36 3 °C)   TempSrc: Temporal   SpO2: 97%   Weight: 86 2 kg (190 lb 1 6 oz)   Height: 5' 8" (1 727 m)      Physical Exam   Constitutional: He is oriented to person, place, and time  He appears well-developed and well-nourished  HENT:   Head: Normocephalic  Eyes: Conjunctivae are normal  Right eye exhibits no discharge  Left eye exhibits no discharge  Neck: Normal range of motion  No thyromegaly present  Cardiovascular: Normal rate, regular rhythm and normal heart sounds  No murmur heard  Pulmonary/Chest: Effort normal and breath sounds normal    Abdominal: Soft  There is no tenderness  Musculoskeletal: Normal range of motion  Lymphadenopathy:     He has no cervical adenopathy  Neurological: He is alert and oriented to person, place, and time  Skin: Skin is warm  No rash noted  Psychiatric: He has a normal mood and affect  His behavior is normal        Patient Instructions   Increase sertraline to 125 mg orally once a day  Limit the day lorazepam to only days when it is required for increased anxiety  You do not want to take the lorazepam every day on a regular basis because it can make depression worse  Call me if the increase in sertraline does not have a beneficial effect after about 3-4 weeks  Do not forget about exercise weight reduction and stop those cigars  Counseled patient on the importance of smoking cessation    Counseling time was over 3 minutes  Patient acknowledges an understanding of the risks of smoking  At the very least, patient is agreeable to try cutting down  Discussed outside services to help with quitting smoking  Discussed the specifics of using nicotine patches, on a tapering schedule,       after stopping smoking  Tapering can last 6 months  While on the nicotine patches, I instructed on the use of PRN     instead of reaching for a cigarette, in a moment of weakness  Heart Healthy Diet   WHAT YOU NEED TO KNOW:   A heart healthy diet is an eating plan low in total fat, unhealthy fats, and sodium (salt)  A heart healthy diet helps decrease your risk for heart disease and stroke  Limit the amount of fat you eat to 25% to 35% of your total daily calories  Limit sodium to less than 2,300 mg each day  DISCHARGE INSTRUCTIONS:   Healthy fats:  Healthy fats can help improve cholesterol levels  The risk for heart disease is decreased when cholesterol levels are normal  Choose healthy fats, such as the following:  · Unsaturated fat  is found in foods such as soybean, canola, olive, corn, and safflower oils  It is also found in soft tub margarine that is made with liquid vegetable oil  · Omega-3 fat  is found in certain fish, such as salmon, tuna, and trout, and in walnuts and flaxseed  Unhealthy fats:  Unhealthy fats can cause unhealthy cholesterol levels in your blood and increase your risk of heart disease  Limit unhealthy fats, such as the following:  · Cholesterol  is found in animal foods, such as eggs and lobster, and in dairy products made from whole milk  Limit cholesterol to less than 300 milligrams (mg) each day  You may need to limit cholesterol to 200 mg each day if you have heart disease  · Saturated fat  is found in meats, such as dorantes and hamburger  It is also found in chicken or turkey skin, whole milk, and butter  Limit saturated fat to less than 7% of your total daily calories   Limit saturated fat to less than 6% if you have heart disease or are at increased risk for it  · Trans fat  is found in packaged foods, such as potato chips and cookies  It is also in hard margarine, some fried foods, and shortening  Avoid trans fats as much as possible    Heart healthy foods and drinks to include:  Ask your dietitian or healthcare provider how many servings to have from each of the following food groups:  · Grains:      ¨ Whole-wheat breads, cereals, and pastas, and brown rice    ¨ Low-fat, low-sodium crackers and chips    · Vegetables:      ¨ Broccoli, green beans, green peas, and spinach    ¨ Collards, kale, and lima beans    ¨ Carrots, sweet potatoes, tomatoes, and peppers    ¨ Canned vegetables with no salt added    · Fruits:      ¨ Bananas, peaches, pears, and pineapple    ¨ Grapes, raisins, and dates    ¨ Oranges, tangerines, grapefruit, orange juice, and grapefruit juice    ¨ Apricots, mangoes, melons, and papaya    ¨ Raspberries and strawberries    ¨ Canned fruit with no added sugar    · Low-fat dairy products:      ¨ Nonfat (skim) milk, 1% milk, and low-fat almond, cashew, or soy milks fortified with calcium    ¨ Low-fat cheese, regular or frozen yogurt, and cottage cheese    · Meats and proteins , such as lean cuts of beef and pork (loin, leg, round), skinless chicken and turkey, legumes, soy products, egg whites, and nuts  Foods and drinks to limit or avoid:  Ask your dietitian or healthcare provider about these and other foods that are high in unhealthy fat, sodium, and sugar:  · Snack or packaged foods , such as frozen dinners, cookies, macaroni and cheese, and cereals with more than 300 mg of sodium per serving    · Canned or dry mixes  for cakes, soups, sauces, or gravies    · Vegetables with added sodium , such as instant potatoes, vegetables with added sauces, or regular canned vegetables    · Other foods high in sodium , such as ketchup, barbecue sauce, salad dressing, pickles, olives, soy sauce, and miso    · High-fat dairy foods  such as whole or 2% milk, cream cheese, or sour cream, and cheeses     · High-fat protein foods  such as high-fat cuts of beef (T-bone steaks, ribs), chicken or turkey with skin, and organ meats, such as liver    · Cured or smoked meats , such as hot dogs, dorantes, and sausage    · Unhealthy fats and oils , such as butter, stick margarine, shortening, and cooking oils such as coconut or palm oil    · Food and drinks high in sugar , such as soft drinks (soda), sports drinks, sweetened tea, candy, cake, cookies, pies, and doughnuts  Other diet guidelines to follow:   · Eat more foods containing omega-3 fats  Eat fish high in omega-3 fats at least 2 times a week  · Limit alcohol  Too much alcohol can damage your heart and raise your blood pressure  Women should limit alcohol to 1 drink a day  Men should limit alcohol to 2 drinks a day  A drink of alcohol is 12 ounces of beer, 5 ounces of wine, or 1½ ounces of liquor  · Choose low-sodium foods  High-sodium foods can lead to high blood pressure  Add little or no salt to food you prepare  Use herbs and spices in place of salt  · Eat more fiber  to help lower cholesterol levels  Eat at least 5 servings of fruits and vegetables each day  Eat 3 ounces of whole-grain foods each day  Legumes (beans) are also a good source of fiber  Lifestyle guidelines:   · Do not smoke  Nicotine and other chemicals in cigarettes and cigars can cause lung and heart damage  Ask your healthcare provider for information if you currently smoke and need help to quit  E-cigarettes or smokeless tobacco still contain nicotine  Talk to your healthcare provider before you use these products  · Exercise regularly  to help you maintain a healthy weight and improve your blood pressure and cholesterol levels  Ask your healthcare provider about the best exercise plan for you  Do not start an exercise program without asking your healthcare provider     Follow up with your healthcare provider as directed:  Write down your questions so you remember to ask them during your visits  © 2017 [employer] Information is for End User's use only and may not be sold, redistributed or otherwise used for commercial purposes  All illustrations and images included in CareNotes® are the copyrighted property of [employer]  or Emanuel Grullon  The above information is an  only  It is not intended as medical advice for individual conditions or treatments  Talk to your doctor, nurse or pharmacist before following any medical regimen to see if it is safe and effective for you

## 2019-04-12 ENCOUNTER — TELEPHONE (OUTPATIENT)
Dept: NEUROLOGY | Facility: CLINIC | Age: 59
End: 2019-04-12

## 2019-05-06 DIAGNOSIS — F41.1 GENERALIZED ANXIETY DISORDER: ICD-10-CM

## 2019-05-06 RX ORDER — LORAZEPAM 1 MG/1
1 TABLET ORAL EVERY 6 HOURS PRN
Qty: 30 TABLET | Refills: 1 | Status: SHIPPED | OUTPATIENT
Start: 2019-05-06 | End: 2019-07-02 | Stop reason: SDUPTHER

## 2019-05-09 ENCOUNTER — TELEPHONE (OUTPATIENT)
Dept: NEUROLOGY | Facility: CLINIC | Age: 59
End: 2019-05-09

## 2019-05-13 ENCOUNTER — OFFICE VISIT (OUTPATIENT)
Dept: FAMILY MEDICINE CLINIC | Facility: CLINIC | Age: 59
End: 2019-05-13
Payer: COMMERCIAL

## 2019-05-13 VITALS
HEIGHT: 68 IN | HEART RATE: 83 BPM | OXYGEN SATURATION: 96 % | RESPIRATION RATE: 20 BRPM | TEMPERATURE: 99.5 F | BODY MASS INDEX: 28.72 KG/M2 | WEIGHT: 189.5 LBS | SYSTOLIC BLOOD PRESSURE: 106 MMHG | DIASTOLIC BLOOD PRESSURE: 64 MMHG

## 2019-05-13 DIAGNOSIS — F32.A DEPRESSIVE DISORDER: Primary | ICD-10-CM

## 2019-05-13 DIAGNOSIS — E78.2 MIXED HYPERLIPIDEMIA: ICD-10-CM

## 2019-05-13 DIAGNOSIS — Z72.0 TOBACCO ABUSE: ICD-10-CM

## 2019-05-13 DIAGNOSIS — I69.354 HEMIPARESIS AFFECTING LEFT SIDE AS LATE EFFECT OF CEREBROVASCULAR ACCIDENT (HCC): ICD-10-CM

## 2019-05-13 DIAGNOSIS — I65.23 CAROTID STENOSIS, BILATERAL: ICD-10-CM

## 2019-05-13 DIAGNOSIS — I10 BENIGN ESSENTIAL HYPERTENSION: ICD-10-CM

## 2019-05-13 DIAGNOSIS — E66.3 OVERWEIGHT: ICD-10-CM

## 2019-05-13 DIAGNOSIS — E11.59 TYPE 2 DIABETES MELLITUS WITH OTHER CIRCULATORY COMPLICATION, WITHOUT LONG-TERM CURRENT USE OF INSULIN (HCC): ICD-10-CM

## 2019-05-13 DIAGNOSIS — G40.89 OTHER SEIZURES (HCC): ICD-10-CM

## 2019-05-13 PROCEDURE — 99214 OFFICE O/P EST MOD 30 MIN: CPT | Performed by: FAMILY MEDICINE

## 2019-05-13 PROCEDURE — 3074F SYST BP LT 130 MM HG: CPT | Performed by: FAMILY MEDICINE

## 2019-05-13 PROCEDURE — 3008F BODY MASS INDEX DOCD: CPT | Performed by: FAMILY MEDICINE

## 2019-05-13 PROCEDURE — 3078F DIAST BP <80 MM HG: CPT | Performed by: FAMILY MEDICINE

## 2019-05-13 RX ORDER — NICOTINE 21 MG/24HR
1 PATCH, TRANSDERMAL 24 HOURS TRANSDERMAL EVERY 24 HOURS
Qty: 28 PATCH | Refills: 0 | Status: SHIPPED | OUTPATIENT
Start: 2019-05-13 | End: 2019-09-16 | Stop reason: SDDI

## 2019-05-13 RX ORDER — POLYETHYLENE GLYCOL 3350 17 G
4 POWDER IN PACKET (EA) ORAL AS NEEDED
Qty: 100 EACH | Refills: 3 | Status: SHIPPED | OUTPATIENT
Start: 2019-05-13 | End: 2019-09-16 | Stop reason: SDDI

## 2019-05-16 ENCOUNTER — TELEPHONE (OUTPATIENT)
Dept: FAMILY MEDICINE CLINIC | Facility: CLINIC | Age: 59
End: 2019-05-16

## 2019-06-07 ENCOUNTER — APPOINTMENT (OUTPATIENT)
Dept: LAB | Facility: HOSPITAL | Age: 59
End: 2019-06-07
Payer: COMMERCIAL

## 2019-06-07 DIAGNOSIS — E11.59 TYPE 2 DIABETES MELLITUS WITH OTHER CIRCULATORY COMPLICATION, WITHOUT LONG-TERM CURRENT USE OF INSULIN (HCC): ICD-10-CM

## 2019-06-07 DIAGNOSIS — E78.2 MIXED HYPERLIPIDEMIA: Primary | ICD-10-CM

## 2019-06-07 DIAGNOSIS — E78.2 MIXED HYPERLIPIDEMIA: ICD-10-CM

## 2019-06-07 LAB
CHOLEST SERPL-MCNC: 155 MG/DL
CREAT UR-MCNC: 124 MG/DL
EST. AVERAGE GLUCOSE BLD GHB EST-MCNC: 114 MG/DL
HBA1C MFR BLD: 5.6 % (ref 4.2–6.3)
HDLC SERPL-MCNC: 38 MG/DL (ref 40–59)
LDLC SERPL CALC-MCNC: 86 MG/DL
MICROALBUMIN UR-MCNC: 12.5 MG/L (ref 0–20)
MICROALBUMIN/CREAT 24H UR: 10 MG/G CREATININE (ref 0–30)
TRIGL SERPL-MCNC: 156 MG/DL

## 2019-06-07 PROCEDURE — 3061F NEG MICROALBUMINURIA REV: CPT | Performed by: FAMILY MEDICINE

## 2019-06-07 PROCEDURE — 36415 COLL VENOUS BLD VENIPUNCTURE: CPT

## 2019-06-07 PROCEDURE — 82570 ASSAY OF URINE CREATININE: CPT | Performed by: FAMILY MEDICINE

## 2019-06-07 PROCEDURE — 4010F ACE/ARB THERAPY RXD/TAKEN: CPT | Performed by: FAMILY MEDICINE

## 2019-06-07 PROCEDURE — 80061 LIPID PANEL: CPT

## 2019-06-07 PROCEDURE — 82043 UR ALBUMIN QUANTITATIVE: CPT | Performed by: FAMILY MEDICINE

## 2019-06-07 PROCEDURE — 83036 HEMOGLOBIN GLYCOSYLATED A1C: CPT

## 2019-06-07 RX ORDER — LOSARTAN POTASSIUM 50 MG/1
50 TABLET ORAL DAILY
Qty: 90 TABLET | Refills: 3 | Status: SHIPPED | OUTPATIENT
Start: 2019-06-07 | End: 2020-05-29 | Stop reason: SDUPTHER

## 2019-06-11 ENCOUNTER — EPISODE CHANGES (OUTPATIENT)
Dept: FAMILY MEDICINE CLINIC | Facility: CLINIC | Age: 59
End: 2019-06-11

## 2019-06-12 DIAGNOSIS — F32.A DEPRESSIVE DISORDER: Primary | ICD-10-CM

## 2019-06-12 RX ORDER — SERTRALINE HYDROCHLORIDE 100 MG/1
100 TABLET, FILM COATED ORAL DAILY
Qty: 30 TABLET | Refills: 5 | Status: SHIPPED | OUTPATIENT
Start: 2019-06-12 | End: 2019-10-02 | Stop reason: SDUPTHER

## 2019-06-20 DIAGNOSIS — G40.89 OTHER SEIZURES (HCC): ICD-10-CM

## 2019-06-20 RX ORDER — LEVETIRACETAM 1000 MG/1
1000 TABLET ORAL 2 TIMES DAILY
Qty: 60 TABLET | Refills: 5 | Status: SHIPPED | OUTPATIENT
Start: 2019-06-20 | End: 2019-12-16 | Stop reason: SDUPTHER

## 2019-07-02 DIAGNOSIS — F41.1 GENERALIZED ANXIETY DISORDER: ICD-10-CM

## 2019-07-02 RX ORDER — LORAZEPAM 1 MG/1
1 TABLET ORAL EVERY 6 HOURS PRN
Qty: 30 TABLET | Refills: 1 | Status: SHIPPED | OUTPATIENT
Start: 2019-07-02 | End: 2019-09-16 | Stop reason: SDUPTHER

## 2019-07-09 DIAGNOSIS — E11.59 TYPE 2 DIABETES MELLITUS WITH OTHER CIRCULATORY COMPLICATION, WITHOUT LONG-TERM CURRENT USE OF INSULIN (HCC): Primary | ICD-10-CM

## 2019-08-02 DIAGNOSIS — R56.9 SEIZURE (HCC): ICD-10-CM

## 2019-08-02 RX ORDER — GABAPENTIN 100 MG/1
CAPSULE ORAL
Qty: 180 CAPSULE | Refills: 1 | Status: SHIPPED | OUTPATIENT
Start: 2019-08-02 | End: 2020-01-13

## 2019-09-16 ENCOUNTER — OFFICE VISIT (OUTPATIENT)
Dept: FAMILY MEDICINE CLINIC | Facility: CLINIC | Age: 59
End: 2019-09-16
Payer: COMMERCIAL

## 2019-09-16 VITALS
WEIGHT: 188 LBS | SYSTOLIC BLOOD PRESSURE: 118 MMHG | HEART RATE: 80 BPM | DIASTOLIC BLOOD PRESSURE: 74 MMHG | BODY MASS INDEX: 28.49 KG/M2 | HEIGHT: 68 IN | OXYGEN SATURATION: 97 % | TEMPERATURE: 98.5 F | RESPIRATION RATE: 18 BRPM

## 2019-09-16 DIAGNOSIS — I69.30 HISTORY OF CEREBROVASCULAR ACCIDENT WITH RESIDUAL DEFICIT: ICD-10-CM

## 2019-09-16 DIAGNOSIS — E78.2 MIXED HYPERLIPIDEMIA: ICD-10-CM

## 2019-09-16 DIAGNOSIS — Z12.5 ENCOUNTER FOR PROSTATE CANCER SCREENING: ICD-10-CM

## 2019-09-16 DIAGNOSIS — F41.1 GENERALIZED ANXIETY DISORDER: ICD-10-CM

## 2019-09-16 DIAGNOSIS — Z23 NEED FOR VACCINATION: Primary | ICD-10-CM

## 2019-09-16 DIAGNOSIS — E66.3 OVERWEIGHT: ICD-10-CM

## 2019-09-16 DIAGNOSIS — F43.21 ADJUSTMENT DISORDER WITH DEPRESSED MOOD: ICD-10-CM

## 2019-09-16 DIAGNOSIS — Z72.0 TOBACCO ABUSE: ICD-10-CM

## 2019-09-16 DIAGNOSIS — I10 BENIGN ESSENTIAL HYPERTENSION: ICD-10-CM

## 2019-09-16 DIAGNOSIS — G40.89 OTHER SEIZURES (HCC): ICD-10-CM

## 2019-09-16 DIAGNOSIS — E11.59 TYPE 2 DIABETES MELLITUS WITH OTHER CIRCULATORY COMPLICATION, WITHOUT LONG-TERM CURRENT USE OF INSULIN (HCC): ICD-10-CM

## 2019-09-16 DIAGNOSIS — K63.5 HYPERPLASTIC COLONIC POLYP, UNSPECIFIED PART OF COLON: ICD-10-CM

## 2019-09-16 PROCEDURE — G0008 ADMIN INFLUENZA VIRUS VAC: HCPCS

## 2019-09-16 PROCEDURE — 3008F BODY MASS INDEX DOCD: CPT | Performed by: FAMILY MEDICINE

## 2019-09-16 PROCEDURE — 3074F SYST BP LT 130 MM HG: CPT | Performed by: FAMILY MEDICINE

## 2019-09-16 PROCEDURE — 90686 IIV4 VACC NO PRSV 0.5 ML IM: CPT

## 2019-09-16 PROCEDURE — 99214 OFFICE O/P EST MOD 30 MIN: CPT | Performed by: FAMILY MEDICINE

## 2019-09-16 PROCEDURE — 3078F DIAST BP <80 MM HG: CPT | Performed by: FAMILY MEDICINE

## 2019-09-16 RX ORDER — LORAZEPAM 1 MG/1
1 TABLET ORAL 2 TIMES DAILY
Qty: 30 TABLET | Refills: 1 | Status: SHIPPED | OUTPATIENT
Start: 2019-09-16 | End: 2020-02-19 | Stop reason: SDUPTHER

## 2019-09-16 RX ORDER — EZETIMIBE 10 MG/1
10 TABLET ORAL DAILY
Qty: 90 TABLET | Refills: 3 | Status: SHIPPED | OUTPATIENT
Start: 2019-09-16 | End: 2020-09-17 | Stop reason: SDUPTHER

## 2019-09-16 NOTE — ASSESSMENT & PLAN NOTE
LDL = 86 -- 6/2019  On atorvaststin at 20mg PO daily  Past attempt to increase atorvastatin did not work due to myalgia  Zetia started on 9/16/19

## 2019-09-16 NOTE — ASSESSMENT & PLAN NOTE
Followed by Dr Noah Oakes, every 6 months  Due in 9/23/2019  Dr Noah Oakes is following the of serial carotid duplex scans over time  No change in meds

## 2019-09-16 NOTE — ASSESSMENT & PLAN NOTE
No HBPM   CMP = WNL - 3/20/19  Blood pressure is controlled on present treatment regimen  Patient misses no doses and tolerates the meds without side effects  Patient avoids salt  Discussed diet, exercise and weight control  No change in present meds   Continue HBPM

## 2019-09-16 NOTE — ASSESSMENT & PLAN NOTE
Lab Results   Component Value Date    HGBA1C 5 6 06/07/2019   Before, HbA1c = 6 0% on 11/26/18  Meds reconciled today  Blood Sugar Average: Last 72 hrs:only occasionally checks HBSM, but they have been WNL  Continue the same dose of metformin but also to continue weight reduction

## 2019-09-16 NOTE — PATIENT INSTRUCTIONS
Continue present medications but add Zetia which is a 10 mg tablet and you can take it every day with your atorvastatin  No change in other medications  Continue dietary program for weight reduction  Stay under 150 g of carbohydrates every day and follow the carbohydrate counting diet  Basic Carbohydrate Counting   WHAT YOU NEED TO KNOW:   Carbohydrate counting is a way to plan your meals by counting the amount of carbohydrate in foods  Carbohydrates are the sugars, starches, and fiber found in fruit, grains, vegetables, and milk products  Carbohydrates increase your blood sugar levels  Carbohydrate counting can help you eat the right amount of carbohydrate to keep your blood sugar levels under control  DISCHARGE INSTRUCTIONS:   What you need to know about planning meals using carbohydrate counting:  · A dietitian or healthcare provider will help you develop a healthy meal plan that works best for you  You will be taught how much carbohydrate to eat or drink for each meal and snack  Your meal plan will be based on your age, weight, usual food intake, and physical activity level  If you have diabetes, it will also include your blood sugar levels and diabetes medicine  Once you know how much carbohydrate you should eat, you can decide what type of food you want to eat  · You will need to know what foods contain carbohydrate and how much they contain  Keep track of the amount of carbohydrate in meals and snacks in order to follow your meal plan  Do not avoid carbohydrates or skip meals  Your blood sugar may fall too low if you do not eat enough carbohydrate or you skip meals  Foods that contain carbohydrate:   · Breads:  Each serving of food listed below contains about 15 g of carbohydrate       ¨ 1 slice of bread (1 ounce) or 1 flour or corn tortilla (6 inch)    ¨ ½ of a hamburger bun or ¼ of a large bagel (about 1 ounce)    ¨ 1 pancake (about 4 inches across and ¼ inch thick)    · Cereals and grains: Serving sizes of ready-to-eat cereals vary  Look at the serving size and the total carbohydrate amount listed on the food label  Each serving of food listed below contains about 15 g of carbohydrate   ¨ ¾ cup of dry, unsweetened, ready-to-eat cereal or ¼ cup of low-fat granola     ¨ ½ cup of oatmeal or other cooked cereal     ¨ ? cup of cooked rice or pasta    · Starchy vegetables and beans:  Each serving of food listed below contains about 15 g of carbohydrate   ¨ ½ cup of corn, green peas, sweet potatoes, or mashed potatoes    ¨ ¼ of a large baked potato    ¨ ½ cup of beans, lentils, and peas (garbanzo, monreal, kidney, white, split, black-eyed)    · Crackers and snacks:  Each serving of food listed below contains about 15 g of carbohydrate   ¨ 3 alesia cracker squares or 8 animal crackers     ¨ 6 saltine-type crackers    ¨ 3 cups of popcorn or ¾ ounce of pretzels, potato chips, or tortilla chips    · Fruit:  Each serving of food listed below contains about 15 g of carbohydrate   ¨ 1 small (4 ounce) piece of fresh fruit or ¾ to 1 cup of fresh fruit    ¨ ½ cup of canned or frozen fruit, packed in natural juice    ¨ ½ cup (4 ounces) of unsweetened fruit juice    ¨ 2 tablespoons of dried fruit    · Desserts or sugary foods:  Each serving of food listed below contains about 15 g of carbohydrate   ¨ 2-inch square unfrosted cake or brownie     ¨ 2 small cookies    ¨ ½ cup of ice cream, frozen yogurt, or nondairy frozen yogurt    ¨ ¼ cup of sherbet or sorbet    ¨ 1 tablespoon of regular syrup, jam, or jelly    ¨ 2 tablespoons of light syrup    · Milk and yogurt:  Foods from the milk group contain about 12 g of carbohydrate per serving  ¨ 1 cup of fat-free or low-fat milk    ¨ 1 cup of soy milk    ¨ ? cup of fat-free, yogurt sweetened with artificial sweetener    · Non-starchy vegetables:  Each serving contains about 5 g of carbohydrate    Three servings of non-starch vegetables count as 1 carbohydrate serving  ¨ ½ cup of cooked vegetables or 1 cup of raw vegetables  This includes beets, broccoli, cabbage, cauliflower, cucumber, mushrooms, tomatoes, and zucchini    ¨ ½ cup of vegetable juice  How to use carbohydrate counting to plan meals:   · Count carbohydrate amounts using serving sizes:      ¨ Pasta dinner example: You plan to have pasta, tossed salad, and an 8-ounce glass of milk  Your healthcare provider tells you that you may have 4 carbohydrate servings for dinner  One carbohydrate serving of pasta is ? cup  One cup of pasta will equal 3 carbohydrate servings  An 8-ounce glass of milk will count as 1 carbohydrate serving  These amounts of food would equal 4 carbohydrate servings  One cup of tossed salad does not count toward your carbohydrate servings  · Count carbohydrate amounts using food labels:  Find the total amount of carbohydrate in a packaged food by reading the food label  Food labels tell you the serving size of the food and the total carbohydrate amount in each serving  Find the serving size on the food label and then decide how many servings you will eat  Multiply the number of servings you plan to eat by the carbohydrate amount per serving  ¨ Granola bar snack example: Your meal plan allows you to have 2 carbohydrate servings (30 grams) of carbohydrate for a snack  You plan to eat 1 package of granola bars, which contains 2 bars  According to the food label, the serving size of food in this package is 1 bar  Each serving (1 bar) contains 25 grams of carbohydrate  The total amount of carbohydrate in this package of granola bars would be 50 g  Based on your meal plan, you should eat only 1 bar  Follow up with your healthcare provider as directed:  Write down your questions so you remember to ask them during your visits  © 2017 [employer] Information is for End User's use only and may not be sold, redistributed or otherwise used for commercial purposes   All illustrations and images included in CareNotes® are the copyrighted property of [employer]  or Emanuel Grullon  The above information is an  only  It is not intended as medical advice for individual conditions or treatments  Talk to your doctor, nurse or pharmacist before following any medical regimen to see if it is safe and effective for you  Influenza Vaccine   WHAT YOU NEED TO KNOW:   The influenza vaccine is an injection given to help prevent influenza (flu)  The flu is caused by a virus  The virus spreads from person to person through coughing and sneezing  Several types of viruses cause the flu  The viruses change over time, so new vaccines are made each year  The vaccine begins to protect you about 2 weeks after you get it  The flu shot usually injected into your upper arm  It may be given in your thigh  You may get a vaccine with a weak or dead virus  DISCHARGE INSTRUCTIONS:   Call 911 for any of the following:   · Your mouth and throat are swollen  · You are wheezing or have trouble breathing  · You have chest pain or your heart is beating faster than normal for you  · You feel like you are going to faint  Seek care immediately if:   · Your face is red or swollen  · You have hives that spread over your body  · You feel weak or dizzy  Contact your healthcare provider if:   · You have increased pain, redness, or swelling around the area where the shot was given  · You have questions or concerns about the influenza vaccine  Apply a warm compress  to the injection area if you got a flu shot  Apply the compress as directed to decrease pain and swelling  Follow up with your healthcare provider as directed:  Write down your questions so you remember to ask them during your visits  © 2017 [employer]  Information is for End User's use only and may not be sold, redistributed or otherwise used for commercial purposes   All illustrations and images included in [employer] are the copyrighted property of [employer]  or Emanuel Grullon  The above information is an  only  It is not intended as medical advice for individual conditions or treatments  Talk to your doctor, nurse or pharmacist before following any medical regimen to see if it is safe and effective for you

## 2019-09-16 NOTE — ASSESSMENT & PLAN NOTE
Has lost 2 lb, over the past 6 months  BMI is down to 28 5  Exercise: none  Patient is educated on the importance of portion control and dieting  Patient is also educated on the importance of exercise  Patient is encouraged to walk 30 min at least 5 times a week  Discussed about benefits of losing weight  Patient acknowledges that they understood what is discussed

## 2019-09-16 NOTE — PROGRESS NOTES
Assessment/Plan:    Overweight  Has lost 2 lb, over the past 6 months  BMI is down to 28 5  Exercise: none  Patient is educated on the importance of portion control and dieting  Patient is also educated on the importance of exercise  Patient is encouraged to walk 30 min at least 5 times a week  Discussed about benefits of losing weight  Patient acknowledges that they understood what is discussed  Benign essential hypertension  No HBPM   CMP = WNL - 3/20/19  Blood pressure is controlled on present treatment regimen  Patient misses no doses and tolerates the meds without side effects  Patient avoids salt  Discussed diet, exercise and weight control  No change in present meds  Continue HBPM       Other seizures ([address] )  Followed by Dr Valencia Jules, every 6 months  Due in 9/23/2019  Dr Valencia Jules is following the of serial carotid duplex scans over time  No change in meds  Mixed hyperlipidemia  LDL = 86 -- 6/2019  On atorvaststin at 20mg PO daily  Past attempt to increase atorvastatin did not work due to myalgia  Zetia started on 9/16/19  Adjustment disorder with depressed mood  Improved on the sertraline at 125 mg, orally, once a day  No change in the sertraline  Tobacco abuse  9/16/19 - still smoking 5 small cigars per day  Encouraged him to quit smoking  Not ready to quit  Smokes outside, only  Type 2 diabetes mellitus with circulatory disorder, without long-term current use of insulin ([facility])  Lab Results   Component Value Date    HGBA1C 5 6 06/07/2019   Before, HbA1c = 6 0% on 11/26/18  Meds reconciled today  Blood Sugar Average: Last 72 hrs:only occasionally checks HBSM, but they have been WNL  Continue the same dose of metformin but also to continue weight reduction           Diagnoses and all orders for this visit:    Need for vaccination  -     FLUZONE: influenza vaccine, quadrivalent, 0 5 mL    Type 2 diabetes mellitus with other circulatory complication, without long-term current use of insulin (HCC)  -     Comprehensive metabolic panel; Future  -     Hemoglobin A1C; Future  -     Microalbumin / creatinine urine ratio    Benign essential hypertension  -     TSH, 3rd generation; Future    Overweight    Adjustment disorder with depressed mood    History of cerebrovascular accident with residual deficit    Hyperplastic colonic polyp, unspecified part of colon  -     Ambulatory referral to Gastroenterology; Future    Mixed hyperlipidemia  -     ezetimibe (ZETIA) 10 mg tablet; Take 1 tablet (10 mg total) by mouth daily  -     Lipid Panel with Direct LDL reflex; Future    Other seizures ([address] )    Tobacco abuse    Encounter for prostate cancer screening  -     PSA Total, Diagnostic; Future    Generalized anxiety disorder  -     LORazepam (ATIVAN) 1 mg tablet; Take 1 tablet (1 mg total) by mouth 2 (two) times a day Only as needed for anxiety  Subjective:     Chief Complaint   Patient presents with    Diabetes    Hyperlipidemia        Patient ID: Ruth Hodge is a 64 y o  male  HPI :Multiple Chronic Medical Conditions    The following portions of the patient's history were reviewed and updated as appropriate: allergies, current medications, past family history, past medical history, past social history, past surgical history and problem list     Review of Systems   Constitutional: Negative for activity change, appetite change, fatigue, fever and unexpected weight change  HENT: Negative for congestion, dental problem and sneezing  Eyes: Negative for discharge and visual disturbance  Respiratory: Negative for cough, chest tightness, shortness of breath and wheezing  Cardiovascular: Negative for chest pain, palpitations and leg swelling  Gastrointestinal: Negative for abdominal pain, constipation, diarrhea, nausea and vomiting  Endocrine: Negative for polydipsia and polyuria  Genitourinary: Negative for dysuria and frequency  Musculoskeletal: Negative for arthralgias  Sees Podiatry every 9 weeks for nail care and diabetes checks  Skin: Negative for rash  Allergic/Immunologic: Negative for environmental allergies and food allergies  Neurological: Positive for weakness  Negative for headaches  Mild left hemiparesis-mostly with left upper arm  Able to walk without any appliances  No falls  Gets pain in the left 2nd toe  Hematological: Negative for adenopathy  Psychiatric/Behavioral: Negative for behavioral problems and sleep disturbance  Objective:  Vitals:    09/16/19 1320   BP: 118/74   BP Location: Right arm   Patient Position: Sitting   Cuff Size: Large   Pulse: 80   Resp: 18   Temp: 98 5 °F (36 9 °C)   TempSrc: Temporal   SpO2: 97%   Weight: 85 3 kg (188 lb)   Height: 5' 8" (1 727 m)      Physical Exam   Constitutional: He is oriented to person, place, and time  He appears well-developed and well-nourished  HENT:   Head: Normocephalic  Right Ear: External ear normal    Left Ear: External ear normal    Nose: Nose normal    Mouth/Throat: Oropharynx is clear and moist    Eyes: Pupils are equal, round, and reactive to light  Conjunctivae are normal  Right eye exhibits no discharge  Left eye exhibits no discharge  Neck: Normal range of motion  Neck supple  No thyromegaly present  Cardiovascular: Normal rate, regular rhythm and normal heart sounds  No murmur heard  Pulmonary/Chest: Effort normal and breath sounds normal    Abdominal: Soft  Bowel sounds are normal  There is no tenderness  Genitourinary: Penis normal    Musculoskeletal: Normal range of motion  He exhibits no edema or tenderness  Feet:    Lymphadenopathy:     He has no cervical adenopathy  Neurological: He is alert and oriented to person, place, and time  He exhibits abnormal muscle tone  Left hemiparesis - arm >> leg  Is self ambulatory without any appliances  Skin: Skin is warm  No rash noted  Psychiatric: He has a normal mood and affect   His behavior is normal  Judgment and thought content normal        Patient Instructions   Continue present medications but add Zetia which is a 10 mg tablet and you can take it every day with your atorvastatin  No change in other medications  Continue dietary program for weight reduction  Stay under 150 g of carbohydrates every day and follow the carbohydrate counting diet  Basic Carbohydrate Counting   WHAT YOU NEED TO KNOW:   Carbohydrate counting is a way to plan your meals by counting the amount of carbohydrate in foods  Carbohydrates are the sugars, starches, and fiber found in fruit, grains, vegetables, and milk products  Carbohydrates increase your blood sugar levels  Carbohydrate counting can help you eat the right amount of carbohydrate to keep your blood sugar levels under control  DISCHARGE INSTRUCTIONS:   What you need to know about planning meals using carbohydrate counting:  · A dietitian or healthcare provider will help you develop a healthy meal plan that works best for you  You will be taught how much carbohydrate to eat or drink for each meal and snack  Your meal plan will be based on your age, weight, usual food intake, and physical activity level  If you have diabetes, it will also include your blood sugar levels and diabetes medicine  Once you know how much carbohydrate you should eat, you can decide what type of food you want to eat  · You will need to know what foods contain carbohydrate and how much they contain  Keep track of the amount of carbohydrate in meals and snacks in order to follow your meal plan  Do not avoid carbohydrates or skip meals  Your blood sugar may fall too low if you do not eat enough carbohydrate or you skip meals  Foods that contain carbohydrate:   · Breads:  Each serving of food listed below contains about 15 g of carbohydrate       ¨ 1 slice of bread (1 ounce) or 1 flour or corn tortilla (6 inch)    ¨ ½ of a hamburger bun or ¼ of a large bagel (about 1 ounce)    ¨ 1 pancake (about 4 inches across and ¼ inch thick)    · Cereals and grains:  Serving sizes of ready-to-eat cereals vary  Look at the serving size and the total carbohydrate amount listed on the food label  Each serving of food listed below contains about 15 g of carbohydrate   ¨ ¾ cup of dry, unsweetened, ready-to-eat cereal or ¼ cup of low-fat granola     ¨ ½ cup of oatmeal or other cooked cereal     ¨ ? cup of cooked rice or pasta    · Starchy vegetables and beans:  Each serving of food listed below contains about 15 g of carbohydrate   ¨ ½ cup of corn, green peas, sweet potatoes, or mashed potatoes    ¨ ¼ of a large baked potato    ¨ ½ cup of beans, lentils, and peas (garbanzo, monreal, kidney, white, split, black-eyed)    · Crackers and snacks:  Each serving of food listed below contains about 15 g of carbohydrate   ¨ 3 alesia cracker squares or 8 animal crackers     ¨ 6 saltine-type crackers    ¨ 3 cups of popcorn or ¾ ounce of pretzels, potato chips, or tortilla chips    · Fruit:  Each serving of food listed below contains about 15 g of carbohydrate   ¨ 1 small (4 ounce) piece of fresh fruit or ¾ to 1 cup of fresh fruit    ¨ ½ cup of canned or frozen fruit, packed in natural juice    ¨ ½ cup (4 ounces) of unsweetened fruit juice    ¨ 2 tablespoons of dried fruit    · Desserts or sugary foods:  Each serving of food listed below contains about 15 g of carbohydrate   ¨ 2-inch square unfrosted cake or brownie     ¨ 2 small cookies    ¨ ½ cup of ice cream, frozen yogurt, or nondairy frozen yogurt    ¨ ¼ cup of sherbet or sorbet    ¨ 1 tablespoon of regular syrup, jam, or jelly    ¨ 2 tablespoons of light syrup    · Milk and yogurt:  Foods from the milk group contain about 12 g of carbohydrate per serving      ¨ 1 cup of fat-free or low-fat milk    ¨ 1 cup of soy milk    ¨ ? cup of fat-free, yogurt sweetened with artificial sweetener    · Non-starchy vegetables:  Each serving contains about 5 g of carbohydrate   Three servings of non-starch vegetables count as 1 carbohydrate serving  ¨ ½ cup of cooked vegetables or 1 cup of raw vegetables  This includes beets, broccoli, cabbage, cauliflower, cucumber, mushrooms, tomatoes, and zucchini    ¨ ½ cup of vegetable juice  How to use carbohydrate counting to plan meals:   · Count carbohydrate amounts using serving sizes:      ¨ Pasta dinner example: You plan to have pasta, tossed salad, and an 8-ounce glass of milk  Your healthcare provider tells you that you may have 4 carbohydrate servings for dinner  One carbohydrate serving of pasta is ? cup  One cup of pasta will equal 3 carbohydrate servings  An 8-ounce glass of milk will count as 1 carbohydrate serving  These amounts of food would equal 4 carbohydrate servings  One cup of tossed salad does not count toward your carbohydrate servings  · Count carbohydrate amounts using food labels:  Find the total amount of carbohydrate in a packaged food by reading the food label  Food labels tell you the serving size of the food and the total carbohydrate amount in each serving  Find the serving size on the food label and then decide how many servings you will eat  Multiply the number of servings you plan to eat by the carbohydrate amount per serving  ¨ Granola bar snack example: Your meal plan allows you to have 2 carbohydrate servings (30 grams) of carbohydrate for a snack  You plan to eat 1 package of granola bars, which contains 2 bars  According to the food label, the serving size of food in this package is 1 bar  Each serving (1 bar) contains 25 grams of carbohydrate  The total amount of carbohydrate in this package of granola bars would be 50 g  Based on your meal plan, you should eat only 1 bar  Follow up with your healthcare provider as directed:  Write down your questions so you remember to ask them during your visits     © 2017 [address] Information is for End User's use only and may not be sold, redistributed or otherwise used for commercial purposes  All illustrations and images included in CareNotes® are the copyrighted property of [employer]  or Emanuel [employer]ullon  The above information is an  only  It is not intended as medical advice for individual conditions or treatments  Talk to your doctor, nurse or pharmacist before following any medical regimen to see if it is safe and effective for you  Influenza Vaccine   WHAT YOU NEED TO KNOW:   The influenza vaccine is an injection given to help prevent influenza (flu)  The flu is caused by a virus  The virus spreads from person to person through coughing and sneezing  Several types of viruses cause the flu  The viruses change over time, so new vaccines are made each year  The vaccine begins to protect you about 2 weeks after you get it  The flu shot usually injected into your upper arm  It may be given in your thigh  You may get a vaccine with a weak or dead virus  DISCHARGE INSTRUCTIONS:   Call 911 for any of the following:   · Your mouth and throat are swollen  · You are wheezing or have trouble breathing  · You have chest pain or your heart is beating faster than normal for you  · You feel like you are going to faint  Seek care immediately if:   · Your face is red or swollen  · You have hives that spread over your body  · You feel weak or dizzy  Contact your healthcare provider if:   · You have increased pain, redness, or swelling around the area where the shot was given  · You have questions or concerns about the influenza vaccine  Apply a warm compress  to the injection area if you got a flu shot  Apply the compress as directed to decrease pain and swelling  Follow up with your healthcare provider as directed:  Write down your questions so you remember to ask them during your visits    © 2017 [employer] Information is for End User's use only and may not be sold, redistributed or otherwise used for commercial purposes  All illustrations and images included in CareNotes® are the copyrighted property of [employer]  or [employer]  The above information is an  only  It is not intended as medical advice for individual conditions or treatments  Talk to your doctor, nurse or pharmacist before following any medical regimen to see if it is safe and effective for you

## 2019-09-16 NOTE — ASSESSMENT & PLAN NOTE
9/16/19 - still smoking 5 small cigars per day  Encouraged him to quit smoking  Not ready to quit  Smokes outside, only

## 2019-10-02 DIAGNOSIS — F32.A DEPRESSIVE DISORDER: ICD-10-CM

## 2019-10-02 RX ORDER — SERTRALINE HYDROCHLORIDE 100 MG/1
TABLET, FILM COATED ORAL
Qty: 90 TABLET | Refills: 1 | Status: SHIPPED | OUTPATIENT
Start: 2019-10-02 | End: 2020-04-03 | Stop reason: SDUPTHER

## 2019-10-04 ENCOUNTER — TELEPHONE (OUTPATIENT)
Dept: FAMILY MEDICINE CLINIC | Facility: CLINIC | Age: 59
End: 2019-10-04

## 2019-10-04 NOTE — TELEPHONE ENCOUNTER
Called 10/3/19 with what he thinks is a metal splinlter in right thumb  Will have friend look at it and call if they can not get it out

## 2019-11-08 DIAGNOSIS — E78.49 OTHER HYPERLIPIDEMIA: ICD-10-CM

## 2019-11-08 RX ORDER — ATORVASTATIN CALCIUM 20 MG/1
TABLET, FILM COATED ORAL
Qty: 90 TABLET | Refills: 2 | Status: SHIPPED | OUTPATIENT
Start: 2019-11-08 | End: 2020-08-13 | Stop reason: SDUPTHER

## 2019-11-25 DIAGNOSIS — E11.9 DIABETES MELLITUS WITHOUT COMPLICATION (HCC): ICD-10-CM

## 2019-12-16 DIAGNOSIS — G40.89 OTHER SEIZURES (HCC): ICD-10-CM

## 2019-12-16 RX ORDER — LEVETIRACETAM 1000 MG/1
1000 TABLET ORAL 2 TIMES DAILY
Qty: 60 TABLET | Refills: 5 | Status: SHIPPED | OUTPATIENT
Start: 2019-12-16 | End: 2020-06-12 | Stop reason: SDUPTHER

## 2019-12-17 ENCOUNTER — TELEPHONE (OUTPATIENT)
Dept: NEUROLOGY | Facility: CLINIC | Age: 59
End: 2019-12-17

## 2019-12-23 ENCOUNTER — OFFICE VISIT (OUTPATIENT)
Dept: NEUROLOGY | Facility: CLINIC | Age: 59
End: 2019-12-23
Payer: COMMERCIAL

## 2019-12-23 VITALS
DIASTOLIC BLOOD PRESSURE: 66 MMHG | BODY MASS INDEX: 27.13 KG/M2 | RESPIRATION RATE: 18 BRPM | HEART RATE: 88 BPM | HEIGHT: 69 IN | WEIGHT: 183.2 LBS | SYSTOLIC BLOOD PRESSURE: 108 MMHG

## 2019-12-23 DIAGNOSIS — G40.89 OTHER SEIZURES (HCC): Primary | ICD-10-CM

## 2019-12-23 DIAGNOSIS — I69.354 HEMIPARESIS AFFECTING LEFT SIDE AS LATE EFFECT OF CEREBROVASCULAR ACCIDENT (HCC): ICD-10-CM

## 2019-12-23 DIAGNOSIS — I65.23 CAROTID STENOSIS, BILATERAL: ICD-10-CM

## 2019-12-23 PROCEDURE — 99213 OFFICE O/P EST LOW 20 MIN: CPT | Performed by: PSYCHIATRY & NEUROLOGY

## 2019-12-23 NOTE — ASSESSMENT & PLAN NOTE
Occurred in January 2014  Neurologic examination is stable  No subsequent symptoms to suggest any recurrent cerebral vascular ischemic events  --continues daily 81 mg aspirin  --continues his statin  --continues his work with his PCP will addressing vascular risk factors

## 2019-12-23 NOTE — ASSESSMENT & PLAN NOTE
Single seizure event, partial in onset with secondary generalization  Occurred December 2014, with focus presumed established as result of his stroke 11 months earlier  Has continued on levetiracetam 1000 mg twice daily with no reported seizure or seizure-like symptoms and no reported adverse side effects  --we again discussed a potential time to consider the possibility of withdrawal in levetiracetam and have decided given the underlying anatomic substrate to be conservative and look the situation after 6 years free of events  --to continue levetiracetam 1000 mg twice daily  --for CBC, CMP and levetiracetam level

## 2019-12-23 NOTE — LETTER
December 24, 2019     Casey Forrester MD  [address] [id number]    Patient: John Priest   YOB: 1960   Date of Visit: 12/23/2019       Dear Dr Padma Villavicencio:    Thank you for referring Dylan Berg to me for evaluation  Below are my notes for this consultation  If you have questions, please do not hesitate to call me  I look forward to following your patient along with you  Sincerely,        Corinne Cordia, MD        CC: No Recipients  Corinne Cordia, MD  12/24/2019  9:48 AM  Addendum  Patient ID: John Priest is a 64 y o  male  Assessment/Plan:    Other seizures ([facility] [address] )  Single seizure event, partial in onset with secondary generalization  Occurred December 2014, with focus presumed established as result of his stroke 11 months earlier  Has continued on levetiracetam 1000 mg twice daily with no reported seizure or seizure-like symptoms and no reported adverse side effects  --we again discussed a potential time to consider the possibility of withdrawal in levetiracetam and have decided given the underlying anatomic substrate to be conservative and look the situation after 6 years free of events  --to continue levetiracetam 1000 mg twice daily  --for CBC, CMP and levetiracetam level  Hemiparesis affecting left side as late effect of cerebrovascular accident [facility])  Occurred in January 2014  Neurologic examination is stable  No subsequent symptoms to suggest any recurrent cerebral vascular ischemic events  --continues daily 81 mg aspirin  --continues his statin  --continues his work with his PCP will addressing vascular risk factors  Carotid stenosis, bilateral  Patient was followed by vascular surgery, Dr María Bundy, with serial carotid ultrasounds  Dr María Bundy retired and Mr Fredrich Fabry states that he is still getting his carotid ultrasounds done on a six-month basis but unable to supply where he is having it done or what vascular surgeon is now monitoring  Stated that his last carotid ultrasound was done perhaps 3 months ago and demonstrated no apparent progressive disease  However, I was unable to find any results in the electronic medical record  --patient on his return home will call the office back to provide us information as to where he is having these studies done, who has been ordering them, and who has been monitoring the results  Addendum:  Mr Deion Garnica did call the office back  His serial carotid ultrasound studies were ordered by Dr Martha Richards through the [facility] ([phone])  The most recent study on October 10, 2019, demonstrating a left ICA stenosis 50-69% with no changes compared to the previous study of March 2019  Mr Deion Garnica will now be followed by the vascular surgical providers there  He will follow up in 3 months  Subjective:    HPI  Patient, 64years of age, is followed here for a single seizure which occurred in December of 2014  The focus of that seizure was presumed secondary to a right hemisphere stroke that occurred in January of 2014  He was unaccompanied today  He has for continued on levetiracetam a 1000 mg twice daily  He has had no seizure or seizure-like symptoms to report  He reports no adverse side effects  He does have a history of bilateral carotid stenosis, and has been followed by vascular surgery, Dr Granados, with serial carotid ultrasound studies, most recently on a six-month basis  However, Dr Granados retired last year  Patient states that he is still being followed with sequential carotid ultrasounds, but cannot supply the location or who indeed is ordering and monitoring these studies      Past Medical History:   Diagnosis Date    CAD (coronary artery disease) 01/15/2014    100% occlusion of right    CVA (cerebral vascular accident) ([facility] [address] ) 01/15/2014    Depressive disorder     Diabetes ([facility] [address] )     Dyslipidemia     Erectile dysfunction 2009    [facility] 01/[facility]/2014    left    Hypertension     Seizure ([address] ) 12/14/2014    Stroke [facility])      Past Surgical History:   Procedure Laterality Date    NO PAST SURGERIES       Social History     Socioeconomic History    Marital status: Single     Spouse name: None    Number of children: None    Years of education: None    Highest education level: None   Occupational History    None   Social Needs    Financial resource strain: None    Food insecurity:     Worry: None     Inability: None    Transportation needs:     Medical: None     Non-medical: None   Tobacco Use    Smoking status: Current Every Day Smoker     Packs/day: 0 50     Types: Cigars, Cigarettes    Smokeless tobacco: Current User    Tobacco comment: heavy tob smoke - 3 cigars day - quit 1/1/14- no passive smoke exposure as per [employer]   Substance and Sexual Activity    Alcohol use: Yes     Comment: occasional    Drug use: No    Sexual activity: Not Currently   Lifestyle    Physical activity:     Days per week: None     Minutes per session: None    Stress: None   Relationships    Social connections:     Talks on phone: None     Gets together: None     Attends Restorationist service: None     Active member of club or organization: None     Attends meetings of clubs or organizations: None     Relationship status: None    Intimate partner violence:     Fear of current or ex partner: None     Emotionally abused: None     Physically abused: None     Forced sexual activity: None   Other Topics Concern    None   Social History Narrative    None     Family History   Problem Relation Age of Onset    Hypertension Family     Diabetes Family     Cancer Mother     Diabetes Mother     Diabetes Father     Diabetes Brother      Allergies   Allergen Reactions    No Active Allergies     No Known Allergies        Current Outpatient Medications:     aspirin 81 mg chewable tablet, Chew 1 tablet every 24 hours, Disp: , Rfl:     atorvastatin (LIPITOR) 20 mg tablet, TAKE 1 TABLET BY MOUTH EVERY DAY, Disp: 90 tablet, Rfl: 2    ezetimibe (ZETIA) 10 mg tablet, Take 1 tablet (10 mg total) by mouth daily, Disp: 90 tablet, Rfl: 3    gabapentin (NEURONTIN) 100 mg capsule, TAKE 1 CAPSULE BY MOUTH TWICE A DAY, Disp: 180 capsule, Rfl: 1    glucose blood test strip, Use as instructed  ONE TOUCH ULTRA, Disp: 100 each, Rfl: 5    Lancets (ONETOUCH ULTRASOFT) lancets, daily, Disp: , Rfl:     levETIRAcetam (KEPPRA) 1000 MG tablet, Take 1 tablet (1,000 mg total) by mouth 2 (two) times a day, Disp: 60 tablet, Rfl: 5    LORazepam (ATIVAN) 1 mg tablet, Take 1 tablet (1 mg total) by mouth 2 (two) times a day Only as needed for anxiety  , Disp: 30 tablet, Rfl: 1    losartan (COZAAR) 50 mg tablet, Take 1 tablet (50 mg total) by mouth daily, Disp: 90 tablet, Rfl: 3    metFORMIN (GLUCOPHAGE) 1000 MG tablet, TAKE 1 TABLET BY MOUTH TWICE A DAY WITH MEALS, Disp: 180 tablet, Rfl: 2    sertraline (ZOLOFT) 100 mg tablet, TAKE 1 TABLET BY MOUTH EVERY DAY, Disp: 90 tablet, Rfl: 1    sertraline (ZOLOFT) 25 mg tablet, Take 1 tablet (25 mg total) by mouth daily, Disp: 90 tablet, Rfl: 3    Objective:    Blood pressure 108/66, pulse 88, resp  rate 18, height 5' 9" (1 753 m), weight 83 1 kg (183 lb 3 2 oz)  Physical Exam Head normocephalic  Eyes nonicteric  No audible anterior neck bruits  Lungs clear to auscultation  Rhythm regular  GI (abdomen) soft nontender  Bowel sounds present  No significant lower extremity edema  Neurological Exam  Alert  Fully oriented  Pleasantly interactive  Continue to exhibit a left hemiparetic gait but again gait independent  Cranial nerves 2-12 tested and grossly intact except for left central facial weakness, unchanged from the past   Motor testing again revealed a spastic left amx paresis, arm involve much more than leg    Reflexes again increased on left as compared to right, unchanged from the past     ROS:    Review of Systems   Constitutional: Positive for appetite change  Negative for fever  HENT: Positive for hearing loss  Negative for tinnitus, trouble swallowing and voice change  Eyes: Negative  Negative for photophobia and pain  Respiratory: Negative  Negative for shortness of breath  Cardiovascular: Negative  Negative for palpitations  Gastrointestinal: Negative  Negative for nausea and vomiting  Endocrine: Negative  Negative for cold intolerance and heat intolerance  Genitourinary: Positive for frequency and urgency  Negative for dysuria  Musculoskeletal: Negative  Negative for myalgias and neck pain  Skin: Negative  Negative for rash  Allergic/Immunologic: Negative  Neurological: Negative  Negative for dizziness, tremors, seizures, syncope, facial asymmetry, speech difficulty, weakness, light-headedness, numbness and headaches  Hematological: Negative  Does not bruise/bleed easily  Psychiatric/Behavioral: Positive for sleep disturbance  Negative for confusion and hallucinations  The patient is nervous/anxious  I personally reviewed the ROS as entered by the medical assistant  *Please note this document was created using voice recognition software and may contain sound-alike word errors  *

## 2019-12-23 NOTE — PROGRESS NOTES
Patient ID: Meri Martell is a 64 y o  male  Assessment/Plan:    Other seizures ([facility] [address] )  Single seizure event, partial in onset with secondary generalization  Occurred December 2014, with focus presumed established as result of his stroke 11 months earlier  Has continued on levetiracetam 1000 mg twice daily with no reported seizure or seizure-like symptoms and no reported adverse side effects  --we again discussed a potential time to consider the possibility of withdrawal in levetiracetam and have decided given the underlying anatomic substrate to be conservative and look the situation after 6 years free of events  --to continue levetiracetam 1000 mg twice daily  --for CBC, CMP and levetiracetam level  Hemiparesis affecting left side as late effect of cerebrovascular accident [facility])  Occurred in January 2014  Neurologic examination is stable  No subsequent symptoms to suggest any recurrent cerebral vascular ischemic events  --continues daily 81 mg aspirin  --continues his statin  --continues his work with his PCP will addressing vascular risk factors  Carotid stenosis, bilateral  Patient was followed by vascular surgery, Dr Majo Zaldivar, with serial carotid ultrasounds  Dr Majo Zaldivar retired and Mr Pascual Rosado states that he is still getting his carotid ultrasounds done on a six-month basis but unable to supply where he is having it done or what vascular surgeon is now monitoring  Stated that his last carotid ultrasound was done perhaps 3 months ago and demonstrated no apparent progressive disease  However, I was unable to find any results in the electronic medical record  --patient on his return home will call the office back to provide us information as to where he is having these studies done, who has been ordering them, and who has been monitoring the results  Addendum:  Mr Pascual Rosado did call the office back    His serial carotid ultrasound studies were ordered by Dr Majo Zaldivar through the [facility] [place] ([phone])  The most recent study on October 10, 2019, demonstrating a left ICA stenosis 50-69% with no changes compared to the previous study of March 2019  Mr Marinda Mcardle will now be followed by the vascular surgical providers there  He will follow up in 3 months  Subjective:    HPI  Patient, 64years of age, is followed here for a single seizure which occurred in December of 2014  The focus of that seizure was presumed secondary to a right hemisphere stroke that occurred in January of 2014  He was unaccompanied today  He has for continued on levetiracetam a 1000 mg twice daily  He has had no seizure or seizure-like symptoms to report  He reports no adverse side effects  He does have a history of bilateral carotid stenosis, and has been followed by vascular surgery, Dr Granados, with serial carotid ultrasound studies, most recently on a six-month basis  However, Dr Granados retired last year  Patient states that he is still being followed with sequential carotid ultrasounds, but cannot supply the location or who indeed is ordering and monitoring these studies      Past Medical History:   Diagnosis Date    CAD (coronary artery disease) 01/15/2014    100% occlusion of right    CVA (cerebral vascular accident) ([facility] [address] ) 01/15/2014    Depressive disorder     Diabetes ([facility] [address] )     Dyslipidemia     Erectile dysfunction 2009    Hemiparesis ([facility] [address] ) 01/15/2014    left    Hypertension     [facility]    Stroke [facility])      Past Surgical History:   Procedure Laterality Date    NO PAST SURGERIES       Social History     Socioeconomic History    Marital status: Single     Spouse name: None    Number of children: None    Years of education: None    Highest education level: None   Occupational History    None   Social Needs    Financial resource strain: None    Food insecurity:     Worry: None     Inability: None    Transportation needs:     Medical: None     Non-medical: None Tobacco Use    Smoking status: Current Every Day Smoker     Packs/day: 0 50     Types: Cigars, Cigarettes    Smokeless tobacco: Current User    Tobacco comment: heavy tob smoke - 3 cigars day - quit 1/1/14- no passive smoke exposure as per [employer]   Substance and Sexual Activity    Alcohol use: Yes     Comment: occasional    Drug use: No    Sexual activity: Not Currently   Lifestyle    Physical activity:     Days per week: None     Minutes per session: None    Stress: None   Relationships    Social connections:     Talks on phone: None     Gets together: None     Attends Mosque service: None     Active member of club or organization: None     Attends meetings of clubs or organizations: None     Relationship status: None    Intimate partner violence:     Fear of current or ex partner: None     Emotionally abused: None     Physically abused: None     Forced sexual activity: None   Other Topics Concern    None   Social History Narrative    None     Family History   Problem Relation Age of Onset    Hypertension Family     Diabetes Family     Cancer Mother     Diabetes Mother     Diabetes Father     Diabetes Brother      Allergies   Allergen Reactions    No Active Allergies     No Known Allergies        Current Outpatient Medications:     aspirin 81 mg chewable tablet, Chew 1 tablet every 24 hours, Disp: , Rfl:     atorvastatin (LIPITOR) 20 mg tablet, TAKE 1 TABLET BY MOUTH EVERY DAY, Disp: 90 tablet, Rfl: 2    ezetimibe (ZETIA) 10 mg tablet, Take 1 tablet (10 mg total) by mouth daily, Disp: 90 tablet, Rfl: 3    gabapentin (NEURONTIN) 100 mg capsule, TAKE 1 CAPSULE BY MOUTH TWICE A DAY, Disp: 180 capsule, Rfl: 1    glucose blood test strip, Use as instructed  ONE TOUCH ULTRA, Disp: 100 each, Rfl: 5    Lancets (ONETOUCH ULTRASOFT) lancets, daily, Disp: , Rfl:     levETIRAcetam (KEPPRA) 1000 MG tablet, Take 1 tablet (1,000 mg total) by mouth 2 (two) times a day, Disp: 60 tablet, Rfl: 5   LORazepam (ATIVAN) 1 mg tablet, Take 1 tablet (1 mg total) by mouth 2 (two) times a day Only as needed for anxiety  , Disp: 30 tablet, Rfl: 1    losartan (COZAAR) 50 mg tablet, Take 1 tablet (50 mg total) by mouth daily, Disp: 90 tablet, Rfl: 3    metFORMIN (GLUCOPHAGE) 1000 MG tablet, TAKE 1 TABLET BY MOUTH TWICE A DAY WITH MEALS, Disp: 180 tablet, Rfl: 2    sertraline (ZOLOFT) 100 mg tablet, TAKE 1 TABLET BY MOUTH EVERY DAY, Disp: 90 tablet, Rfl: 1    sertraline (ZOLOFT) 25 mg tablet, Take 1 tablet (25 mg total) by mouth daily, Disp: 90 tablet, Rfl: 3    Objective:    Blood pressure 108/66, pulse 88, resp  rate 18, height 5' 9" (1 753 m), weight 83 1 kg (183 lb 3 2 oz)  Physical Exam Head normocephalic  Eyes nonicteric  No audible anterior neck bruits  Lungs clear to auscultation  Rhythm regular  GI (abdomen) soft nontender  Bowel sounds present  No significant lower extremity edema  Neurological Exam  Alert  Fully oriented  Pleasantly interactive  Continue to exhibit a left hemiparetic gait but again gait independent  Cranial nerves 2-12 tested and grossly intact except for left central facial weakness, unchanged from the past   Motor testing again revealed a spastic left max paresis, arm involve much more than leg  Reflexes again increased on left as compared to right, unchanged from the past     ROS:    Review of Systems   Constitutional: Positive for appetite change  Negative for fever  HENT: Positive for hearing loss  Negative for tinnitus, trouble swallowing and voice change  Eyes: Negative  Negative for photophobia and pain  Respiratory: Negative  Negative for shortness of breath  Cardiovascular: Negative  Negative for palpitations  Gastrointestinal: Negative  Negative for nausea and vomiting  Endocrine: Negative  Negative for cold intolerance and heat intolerance  Genitourinary: Positive for frequency and urgency  Negative for dysuria  Musculoskeletal: Negative  Negative for myalgias and neck pain  Skin: Negative  Negative for rash  Allergic/Immunologic: Negative  Neurological: Negative  Negative for dizziness, tremors, seizures, syncope, facial asymmetry, speech difficulty, weakness, light-headedness, numbness and headaches  Hematological: Negative  Does not bruise/bleed easily  Psychiatric/Behavioral: Positive for sleep disturbance  Negative for confusion and hallucinations  The patient is nervous/anxious  I personally reviewed the ROS as entered by the medical assistant  *Please note this document was created using voice recognition software and may contain sound-alike word errors  *

## 2019-12-23 NOTE — PATIENT INSTRUCTIONS
Please confirm who is actually ordering your carotid ultrasound serial studies and what vascular surgeon is following you now that your previous surgeon is retired

## 2019-12-23 NOTE — ASSESSMENT & PLAN NOTE
Patient was followed by vascular surgery, Dr Ellie Sarmiento, with serial carotid ultrasounds  Dr Ellie Sarmiento retired and Mr Aida Guido states that he is still getting his carotid ultrasounds done on a six-month basis but unable to supply where he is having it done or what vascular surgeon is now monitoring  Stated that his last carotid ultrasound was done perhaps 3 months ago and demonstrated no apparent progressive disease  However, I was unable to find any results in the electronic medical record  --patient on his return home will call the office back to provide us information as to where he is having these studies done, who has been ordering them, and who has been monitoring the results  Addendum:  Mr Aida Guido did call the office back  His serial carotid ultrasound studies were ordered by Dr Ellie Sarmiento through the [facility] ([phone])  The most recent study on October 10, 2019, demonstrating a left ICA stenosis 50-69% with no changes compared to the previous study of March 2019  Mr Aida Guido will now be followed by the vascular surgical providers there

## 2019-12-23 NOTE — LETTER
December 23, 2019     Juan Humphrey MD   [facility] [id number]    Patient: Genna Wagner   YOB: 1960   Date of Visit: 12/23/2019       Dear Dr Wandy Gutierrez:    Thank you for referring Keaton Marina to me for evaluation  Below are my notes for this consultation  If you have questions, please do not hesitate to call me  I look forward to following your patient along with you  Sincerely,        Alex Lind MD        CC: No Recipients  Alex iLnd MD  12/23/2019  1:23 PM  Sign at close encounter  Patient ID: Genna Wagner is a 64 y o  male  Assessment/Plan:    Other seizures ([facility] [address] )  Single seizure event, partial in onset with secondary generalization  Occurred December 2014, with focus presumed established as result of his stroke 11 months earlier  Has continued on levetiracetam 1000 mg twice daily with no reported seizure or seizure-like symptoms and no reported adverse side effects  --we again discussed a potential time to consider the possibility of withdrawal in levetiracetam and have decided given the underlying anatomic substrate to be conservative and look the situation after 6 years free of events  --to continue levetiracetam 1000 mg twice daily  --for CBC, CMP and levetiracetam level  Hemiparesis affecting left side as late effect of cerebrovascular accident [facility])  Occurred in January 2014  Neurologic examination is stable  No subsequent symptoms to suggest any recurrent cerebral vascular ischemic events  --continues daily 81 mg aspirin  --continues his statin  --continues his work with his PCP will addressing vascular risk factors  Carotid stenosis, bilateral  Patient was followed by vascular surgery, Dr Paddy Eugene, with serial carotid ultrasounds  Dr Paddy Eugene retired and Mr Chris Yeager states that he is still getting his carotid ultrasounds done on a six-month basis but unable to supply where he is having it done or what vascular surgeon is now monitoring  Stated that his last carotid ultrasound was done perhaps 3 months ago and demonstrated no apparent progressive disease  However, I was unable to find any results in the electronic medical record  --patient on his return home will call the office back to provide us information as to where he is having these studies done, who has been ordering them, and who has been monitoring the results  He will follow up in 3 months  Subjective:    HPI  Patient, 64years of age, is followed here for a single seizure which occurred in December of 2014  The focus of that seizure was presumed secondary to a right hemisphere stroke that occurred in January of 2014  He was unaccompanied today  He has for continued on levetiracetam a 1000 mg twice daily  He has had no seizure or seizure-like symptoms to report  He reports no adverse side effects  He does have a history of bilateral carotid stenosis, and has been followed by vascular surgery, Dr Granados, with serial carotid ultrasound studies, most recently on a six-month basis  However, Dr Granados retired last year  Patient states that he is still being followed with sequential carotid ultrasounds, but cannot supply the location or who indeed is ordering and monitoring these studies      Past Medical History:   Diagnosis Date    CAD (coronary artery disease) 01/15/2014    100% occlusion of right    CVA [facility]cerebral vascular accident) ([facility] [address] ) 01/15/2014    Depressive disorder     Diabetes ([facility] [address] )     Dyslipidemia     Erectile dysfunction 2009    Hemiparesis ([facility] [address] ) 01/15/2014    left    Hypertension     [facility]2014    Stroke [facility])      Past Surgical History:   Procedure Laterality Date    NO PAST SURGERIES       Social History     Socioeconomic History    Marital status: Single     Spouse name: None    Number of children: None    Years of education: None    Highest education level: None   Occupational History    None   Social Needs    Financial resource strain: None    Food insecurity:     Worry: None     Inability: None    Transportation needs:     Medical: None     Non-medical: None   Tobacco Use    Smoking status: Current Every Day Smoker     Packs/day: 0 50     Types: Cigars, Cigarettes    Smokeless tobacco: Current User    Tobacco comment: heavy tob smoke - 3 cigars day - quit 1/1/14- no passive smoke exposure as per [employer]   Substance and Sexual Activity    Alcohol use: Yes     Comment: occasional    Drug use: No    Sexual activity: Not Currently   Lifestyle    Physical activity:     Days per week: None     Minutes per session: None    Stress: None   Relationships    Social connections:     Talks on phone: None     Gets together: None     Attends [employer] service: None     Active member of club or organization: None     Attends meetings of clubs or organizations: None     Relationship status: None    Intimate partner violence:     Fear of current or ex partner: None     Emotionally abused: None     Physically abused: None     Forced sexual activity: None   Other Topics Concern    None   Social History Narrative    None     Family History   Problem Relation Age of Onset    Hypertension Family     Diabetes Family     Cancer Mother     Diabetes Mother     Diabetes Father     Diabetes Brother      Allergies   Allergen Reactions    No Active Allergies     No Known Allergies        Current Outpatient Medications:     aspirin 81 mg chewable tablet, Chew 1 tablet every 24 hours, Disp: , Rfl:     atorvastatin (LIPITOR) 20 mg tablet, TAKE 1 TABLET BY MOUTH EVERY DAY, Disp: 90 tablet, Rfl: 2    ezetimibe (ZETIA) 10 mg tablet, Take 1 tablet (10 mg total) by mouth daily, Disp: 90 tablet, Rfl: 3    gabapentin (NEURONTIN) 100 mg capsule, TAKE 1 CAPSULE BY MOUTH TWICE A DAY, Disp: 180 capsule, Rfl: 1    glucose blood test strip, Use as instructed  ONE TOUCH ULTRA, Disp: 100 each, Rfl: 5    Lancets (ONETOUCH ULTRASOFT) lancets, daily, Disp: , Rfl:     levETIRAcetam (KEPPRA) 1000 MG tablet, Take 1 tablet (1,000 mg total) by mouth 2 (two) times a day, Disp: 60 tablet, Rfl: 5    LORazepam (ATIVAN) 1 mg tablet, Take 1 tablet (1 mg total) by mouth 2 (two) times a day Only as needed for anxiety  , Disp: 30 tablet, Rfl: 1    losartan (COZAAR) 50 mg tablet, Take 1 tablet (50 mg total) by mouth daily, Disp: 90 tablet, Rfl: 3    metFORMIN (GLUCOPHAGE) 1000 MG tablet, TAKE 1 TABLET BY MOUTH TWICE A DAY WITH MEALS, Disp: 180 tablet, Rfl: 2    sertraline (ZOLOFT) 100 mg tablet, TAKE 1 TABLET BY MOUTH EVERY DAY, Disp: 90 tablet, Rfl: 1    sertraline (ZOLOFT) 25 mg tablet, Take 1 tablet (25 mg total) by mouth daily, Disp: 90 tablet, Rfl: 3    Objective:    Blood pressure 108/66, pulse 88, resp  rate 18, height 5' 9" (1 753 m), weight 83 1 kg (183 lb 3 2 oz)  Physical Exam Head normocephalic  Eyes nonicteric  No audible anterior neck bruits  Lungs clear to auscultation  Rhythm regular  GI (abdomen) soft nontender  Bowel sounds present  No significant lower extremity edema  Neurological Exam  Alert  Fully oriented  Pleasantly interactive  Continue to exhibit a left hemiparetic gait but again gait independent  Cranial nerves 2-12 tested and grossly intact except for left central facial weakness, unchanged from the past   Motor testing again revealed a spastic left max paresis, arm involve much more than leg  Reflexes again increased on left as compared to right, unchanged from the past     ROS:    Review of Systems   Constitutional: Positive for appetite change  Negative for fever  HENT: Positive for hearing loss  Negative for tinnitus, trouble swallowing and voice change  Eyes: Negative  Negative for photophobia and pain  Respiratory: Negative  Negative for shortness of breath  Cardiovascular: Negative  Negative for palpitations  Gastrointestinal: Negative  Negative for nausea and vomiting  Endocrine: Negative  Negative for cold intolerance and heat intolerance  Genitourinary: Positive for frequency and urgency  Negative for dysuria  Musculoskeletal: Negative  Negative for myalgias and neck pain  Skin: Negative  Negative for rash  Allergic/Immunologic: Negative  Neurological: Negative  Negative for dizziness, tremors, seizures, syncope, facial asymmetry, speech difficulty, weakness, light-headedness, numbness and headaches  Hematological: Negative  Does not bruise/bleed easily  Psychiatric/Behavioral: Positive for sleep disturbance  Negative for confusion and hallucinations  The patient is nervous/anxious  I personally reviewed the ROS as entered by the medical assistant  *Please note this document was created using voice recognition software and may contain sound-alike word errors  *

## 2019-12-27 ENCOUNTER — APPOINTMENT (OUTPATIENT)
Dept: LAB | Facility: HOSPITAL | Age: 59
End: 2019-12-27
Payer: COMMERCIAL

## 2019-12-27 DIAGNOSIS — E11.59 TYPE 2 DIABETES MELLITUS WITH OTHER CIRCULATORY COMPLICATION, WITHOUT LONG-TERM CURRENT USE OF INSULIN (HCC): ICD-10-CM

## 2019-12-27 DIAGNOSIS — I10 BENIGN ESSENTIAL HYPERTENSION: ICD-10-CM

## 2019-12-27 DIAGNOSIS — G40.89 OTHER SEIZURES (HCC): ICD-10-CM

## 2019-12-27 DIAGNOSIS — Z12.5 ENCOUNTER FOR PROSTATE CANCER SCREENING: ICD-10-CM

## 2019-12-27 DIAGNOSIS — E78.2 MIXED HYPERLIPIDEMIA: ICD-10-CM

## 2019-12-27 LAB
ALBUMIN SERPL BCP-MCNC: 4.1 G/DL (ref 3–5.2)
ALP SERPL-CCNC: 64 U/L (ref 43–122)
ALT SERPL W P-5'-P-CCNC: 34 U/L (ref 9–52)
ANION GAP SERPL CALCULATED.3IONS-SCNC: 10 MMOL/L (ref 5–14)
AST SERPL W P-5'-P-CCNC: 24 U/L (ref 17–59)
BASOPHILS # BLD AUTO: 0.1 THOUSANDS/ΜL (ref 0–0.1)
BASOPHILS NFR BLD AUTO: 1 % (ref 0–1)
BILIRUB SERPL-MCNC: 0.6 MG/DL
BUN SERPL-MCNC: 13 MG/DL (ref 5–25)
CALCIUM SERPL-MCNC: 9.6 MG/DL (ref 8.4–10.2)
CHLORIDE SERPL-SCNC: 101 MMOL/L (ref 97–108)
CHOLEST SERPL-MCNC: 142 MG/DL
CO2 SERPL-SCNC: 28 MMOL/L (ref 22–30)
CREAT SERPL-MCNC: 0.47 MG/DL (ref 0.7–1.5)
CREAT UR-MCNC: 94.2 MG/DL
EOSINOPHIL # BLD AUTO: 0.3 THOUSAND/ΜL (ref 0–0.4)
EOSINOPHIL NFR BLD AUTO: 4 % (ref 0–6)
ERYTHROCYTE [DISTWIDTH] IN BLOOD BY AUTOMATED COUNT: 13.3 %
EST. AVERAGE GLUCOSE BLD GHB EST-MCNC: 120 MG/DL
GFR SERPL CREATININE-BSD FRML MDRD: 123 ML/MIN/1.73SQ M
GLUCOSE P FAST SERPL-MCNC: 123 MG/DL (ref 70–99)
HBA1C MFR BLD: 5.8 % (ref 4.2–6.3)
HCT VFR BLD AUTO: 46.2 % (ref 41–53)
HDLC SERPL-MCNC: 43 MG/DL
HGB BLD-MCNC: 15.8 G/DL (ref 13.5–17.5)
LDLC SERPL CALC-MCNC: 66 MG/DL
LYMPHOCYTES # BLD AUTO: 1.5 THOUSANDS/ΜL (ref 0.5–4)
LYMPHOCYTES NFR BLD AUTO: 19 % (ref 25–45)
MCH RBC QN AUTO: 31 PG (ref 26–34)
MCHC RBC AUTO-ENTMCNC: 34.2 G/DL (ref 31–36)
MCV RBC AUTO: 91 FL (ref 80–100)
MICROALBUMIN UR-MCNC: 12.4 MG/L (ref 0–20)
MICROALBUMIN/CREAT 24H UR: 13 MG/G CREATININE (ref 0–30)
MONOCYTES # BLD AUTO: 0.5 THOUSAND/ΜL (ref 0.2–0.9)
MONOCYTES NFR BLD AUTO: 6 % (ref 1–10)
NEUTROPHILS # BLD AUTO: 5.4 THOUSANDS/ΜL (ref 1.8–7.8)
NEUTS SEG NFR BLD AUTO: 69 % (ref 45–65)
PLATELET # BLD AUTO: 339 THOUSANDS/UL (ref 150–450)
PMV BLD AUTO: 9.3 FL (ref 8.9–12.7)
POTASSIUM SERPL-SCNC: 3.9 MMOL/L (ref 3.6–5)
PROT SERPL-MCNC: 6.9 G/DL (ref 5.9–8.4)
PSA SERPL-MCNC: 1.9 NG/ML (ref 0–4)
RBC # BLD AUTO: 5.09 MILLION/UL (ref 4.5–5.9)
SODIUM SERPL-SCNC: 139 MMOL/L (ref 137–147)
TRIGL SERPL-MCNC: 166 MG/DL
TSH SERPL DL<=0.05 MIU/L-ACNC: 2.36 UIU/ML (ref 0.47–4.68)
WBC # BLD AUTO: 7.9 THOUSAND/UL (ref 4.5–11)

## 2019-12-27 PROCEDURE — 80053 COMPREHEN METABOLIC PANEL: CPT

## 2019-12-27 PROCEDURE — 36415 COLL VENOUS BLD VENIPUNCTURE: CPT

## 2019-12-27 PROCEDURE — 80061 LIPID PANEL: CPT

## 2019-12-27 PROCEDURE — 84443 ASSAY THYROID STIM HORMONE: CPT

## 2019-12-27 PROCEDURE — 80177 DRUG SCRN QUAN LEVETIRACETAM: CPT

## 2019-12-27 PROCEDURE — 85025 COMPLETE CBC W/AUTO DIFF WBC: CPT

## 2019-12-27 PROCEDURE — 84153 ASSAY OF PSA TOTAL: CPT

## 2019-12-27 PROCEDURE — 83036 HEMOGLOBIN GLYCOSYLATED A1C: CPT

## 2019-12-27 PROCEDURE — 82043 UR ALBUMIN QUANTITATIVE: CPT | Performed by: FAMILY MEDICINE

## 2019-12-27 PROCEDURE — 82570 ASSAY OF URINE CREATININE: CPT | Performed by: FAMILY MEDICINE

## 2019-12-28 NOTE — ASSESSMENT & PLAN NOTE
06/07/19 - HDL/LDL/TG = 38/86/156  LDL = 86 -- 6/2019  On atorvaststin 20mg PO daily  Past attempt to increase atorvastatin did not work due to myalgia  Zetia started on 9/16/19 12/27/19 - HDL/LDL/TG = 43/66/166 - on atorvastatin 20mg + Zetia 10mg  Labs reviewed today, with patient  No change in meds

## 2019-12-28 NOTE — ASSESSMENT & PLAN NOTE
Lab Results   Component Value Date    HGBA1C 5 8 12/27/2019   HbA1c = 5 6% on 6/7/19 and 6 0% on 11/26/18  FBS = 123 on 12/27/19  Urine for MCR = 13

## 2019-12-31 LAB — LEVETIRACETAM SERPL-MCNC: 26.8 UG/ML (ref 10–40)

## 2020-01-06 ENCOUNTER — TELEPHONE (OUTPATIENT)
Dept: NEUROLOGY | Facility: CLINIC | Age: 60
End: 2020-01-06

## 2020-01-06 NOTE — TELEPHONE ENCOUNTER
Patient calling to let Dr Yoly Leigh know that he completed his labs 12/27/19  No further questions

## 2020-01-13 DIAGNOSIS — R56.9 SEIZURE (HCC): ICD-10-CM

## 2020-01-13 RX ORDER — GABAPENTIN 100 MG/1
CAPSULE ORAL
Qty: 180 CAPSULE | Refills: 1 | Status: SHIPPED | OUTPATIENT
Start: 2020-01-13 | End: 2020-07-16 | Stop reason: SDUPTHER

## 2020-01-22 ENCOUNTER — OFFICE VISIT (OUTPATIENT)
Dept: FAMILY MEDICINE CLINIC | Facility: CLINIC | Age: 60
End: 2020-01-22
Payer: COMMERCIAL

## 2020-01-22 VITALS
HEIGHT: 69 IN | WEIGHT: 186.6 LBS | HEART RATE: 78 BPM | SYSTOLIC BLOOD PRESSURE: 132 MMHG | TEMPERATURE: 98.8 F | BODY MASS INDEX: 27.64 KG/M2 | OXYGEN SATURATION: 96 % | DIASTOLIC BLOOD PRESSURE: 68 MMHG | RESPIRATION RATE: 18 BRPM

## 2020-01-22 DIAGNOSIS — Z11.59 SCREENING FOR VIRAL DISEASE: ICD-10-CM

## 2020-01-22 DIAGNOSIS — I69.354 HEMIPARESIS AFFECTING LEFT SIDE AS LATE EFFECT OF CEREBROVASCULAR ACCIDENT (HCC): Chronic | ICD-10-CM

## 2020-01-22 DIAGNOSIS — E11.59 TYPE 2 DIABETES MELLITUS WITH OTHER CIRCULATORY COMPLICATION, WITHOUT LONG-TERM CURRENT USE OF INSULIN (HCC): Chronic | ICD-10-CM

## 2020-01-22 DIAGNOSIS — Z72.0 TOBACCO ABUSE: Chronic | ICD-10-CM

## 2020-01-22 DIAGNOSIS — F32.A DEPRESSIVE DISORDER: ICD-10-CM

## 2020-01-22 DIAGNOSIS — N40.0 BENIGN PROSTATIC HYPERPLASIA WITHOUT LOWER URINARY TRACT SYMPTOMS: Chronic | ICD-10-CM

## 2020-01-22 DIAGNOSIS — F32.4 MAJOR DEPRESSIVE DISORDER WITH SINGLE EPISODE, IN PARTIAL REMISSION (HCC): Chronic | ICD-10-CM

## 2020-01-22 DIAGNOSIS — I65.23 CAROTID STENOSIS, BILATERAL: ICD-10-CM

## 2020-01-22 DIAGNOSIS — Z23 NEED FOR VACCINATION: ICD-10-CM

## 2020-01-22 DIAGNOSIS — H61.23 BILATERAL IMPACTED CERUMEN: ICD-10-CM

## 2020-01-22 DIAGNOSIS — E66.3 OVERWEIGHT: Chronic | ICD-10-CM

## 2020-01-22 DIAGNOSIS — I10 BENIGN ESSENTIAL HYPERTENSION: Primary | Chronic | ICD-10-CM

## 2020-01-22 PROCEDURE — 3008F BODY MASS INDEX DOCD: CPT | Performed by: FAMILY MEDICINE

## 2020-01-22 PROCEDURE — 99214 OFFICE O/P EST MOD 30 MIN: CPT | Performed by: FAMILY MEDICINE

## 2020-01-22 PROCEDURE — 3075F SYST BP GE 130 - 139MM HG: CPT | Performed by: FAMILY MEDICINE

## 2020-01-22 PROCEDURE — G0009 ADMIN PNEUMOCOCCAL VACCINE: HCPCS

## 2020-01-22 PROCEDURE — 3078F DIAST BP <80 MM HG: CPT | Performed by: FAMILY MEDICINE

## 2020-01-22 PROCEDURE — 90732 PPSV23 VACC 2 YRS+ SUBQ/IM: CPT

## 2020-01-22 PROCEDURE — G0438 PPPS, INITIAL VISIT: HCPCS | Performed by: FAMILY MEDICINE

## 2020-01-22 PROCEDURE — 3725F SCREEN DEPRESSION PERFORMED: CPT | Performed by: FAMILY MEDICINE

## 2020-01-22 NOTE — ASSESSMENT & PLAN NOTE
01/22/2020-still smoking for cigars per day  Absolutely, needs to quit smoking  His plan is to quit cold turkey and use nicotine lozenges  I encouraged him to definitely do so

## 2020-01-22 NOTE — ASSESSMENT & PLAN NOTE
Has lost 2 lb since his visit of 09/2019  No exercise but tries to watch his carbohydrates  Has not been counting carbohydrates quantitatively

## 2020-01-22 NOTE — PATIENT INSTRUCTIONS
Must quit smoking  The following is the way to quit smoking using the nicotine patches if you decide to give it a try and you should give us a call for those prescriptions  Counseled patient on the importance of smoking cessation  Counseling time was over 3 minutes  Patient acknowledges an understanding of the risks of smoking  At the very least, patient is agreeable to try cutting down  Discussed outside services to help with quitting smoking  Discussed the specifics of using nicotine patches, on a tapering schedule,       after stopping smoking  Tapering can last 6 months  While on the nicotine patches, I instructed on the use of PRN nicotine gum,       instead of reaching for a cigarette, in a moment of weakness  Recommendations for weight loss:   Eat smaller portions at meals and drink water with meals  Avoid bread and bread products, like bagels and muffins  Avoid fast foods  Eat HEALTHY snacks  Avoid drinking soda or sweet drinks  Eat 3 to 5 servings of fruit and vegetables per day  Eat more fish and only lean meat  Try carbohydrate counting  You can use the following diet as a reference in order to try to keep year total g carbohydrates below 150 g per day  In order to do that you absolutely have to be counting the carbohydrates  Basic Carbohydrate Counting   WHAT YOU NEED TO KNOW:   Carbohydrate counting is a way to plan your meals by counting the amount of carbohydrate in foods  Carbohydrates are the sugars, starches, and fiber found in fruit, grains, vegetables, and milk products  Carbohydrates increase your blood sugar levels  Carbohydrate counting can help you eat the right amount of carbohydrate to keep your blood sugar levels under control  DISCHARGE INSTRUCTIONS:   What you need to know about planning meals using carbohydrate counting:  · A dietitian or healthcare provider will help you develop a healthy meal plan that works best for you   You will be taught how much carbohydrate to eat or drink for each meal and snack  Your meal plan will be based on your age, weight, usual food intake, and physical activity level  If you have diabetes, it will also include your blood sugar levels and diabetes medicine  Once you know how much carbohydrate you should eat, you can decide what type of food you want to eat  · You will need to know what foods contain carbohydrate and how much they contain  Keep track of the amount of carbohydrate in meals and snacks in order to follow your meal plan  Do not avoid carbohydrates or skip meals  Your blood sugar may fall too low if you do not eat enough carbohydrate or you skip meals  Foods that contain carbohydrate:   · Breads:  Each serving of food listed below contains about 15 g of carbohydrate   ¨ 1 slice of bread (1 ounce) or 1 flour or corn tortilla (6 inch)    ¨ ½ of a hamburger bun or ¼ of a large bagel (about 1 ounce)    ¨ 1 pancake (about 4 inches across and ¼ inch thick)    · Cereals and grains:  Serving sizes of ready-to-eat cereals vary  Look at the serving size and the total carbohydrate amount listed on the food label  Each serving of food listed below contains about 15 g of carbohydrate   ¨ ¾ cup of dry, unsweetened, ready-to-eat cereal or ¼ cup of low-fat granola     ¨ ½ cup of oatmeal or other cooked cereal     ¨ ? cup of cooked rice or pasta    · Starchy vegetables and beans:  Each serving of food listed below contains about 15 g of carbohydrate   ¨ ½ cup of corn, green peas, sweet potatoes, or mashed potatoes    ¨ ¼ of a large baked potato    ¨ ½ cup of beans, lentils, and peas (garbanzo, monreal, kidney, white, split, black-eyed)    · Crackers and snacks:  Each serving of food listed below contains about 15 g of carbohydrate       ¨ 3 alesia cracker squares or 8 animal crackers     ¨ 6 saltine-type crackers    ¨ 3 cups of popcorn or ¾ ounce of pretzels, potato chips, or tortilla chips    · Fruit:  Each serving of food listed below contains about 15 g of carbohydrate   ¨ 1 small (4 ounce) piece of fresh fruit or ¾ to 1 cup of fresh fruit    ¨ ½ cup of canned or frozen fruit, packed in natural juice    ¨ ½ cup (4 ounces) of unsweetened fruit juice    ¨ 2 tablespoons of dried fruit    · Desserts or sugary foods:  Each serving of food listed below contains about 15 g of carbohydrate   ¨ 2-inch square unfrosted cake or brownie     ¨ 2 small cookies    ¨ ½ cup of ice cream, frozen yogurt, or nondairy frozen yogurt    ¨ ¼ cup of sherbet or sorbet    ¨ 1 tablespoon of regular syrup, jam, or jelly    ¨ 2 tablespoons of light syrup    · Milk and yogurt:  Foods from the milk group contain about 12 g of carbohydrate per serving  ¨ 1 cup of fat-free or low-fat milk    ¨ 1 cup of soy milk    ¨ ? cup of fat-free, yogurt sweetened with artificial sweetener    · Non-starchy vegetables:  Each serving contains about 5 g of carbohydrate   Three servings of non-starch vegetables count as 1 carbohydrate serving  ¨ ½ cup of cooked vegetables or 1 cup of raw vegetables  This includes beets, broccoli, cabbage, cauliflower, cucumber, mushrooms, tomatoes, and zucchini    ¨ ½ cup of vegetable juice  How to use carbohydrate counting to plan meals:   · Count carbohydrate amounts using serving sizes:      ¨ Pasta dinner example: You plan to have pasta, tossed salad, and an 8-ounce glass of milk  Your healthcare provider tells you that you may have 4 carbohydrate servings for dinner  One carbohydrate serving of pasta is ? cup  One cup of pasta will equal 3 carbohydrate servings  An 8-ounce glass of milk will count as 1 carbohydrate serving  These amounts of food would equal 4 carbohydrate servings  One cup of tossed salad does not count toward your carbohydrate servings  · Count carbohydrate amounts using food labels:  Find the total amount of carbohydrate in a packaged food by reading the food label   Food labels tell you the serving size of the food and the total carbohydrate amount in each serving  Find the serving size on the food label and then decide how many servings you will eat  Multiply the number of servings you plan to eat by the carbohydrate amount per serving  ¨ Granola bar snack example: Your meal plan allows you to have 2 carbohydrate servings (30 grams) of carbohydrate for a snack  You plan to eat 1 package of granola bars, which contains 2 bars  According to the food label, the serving size of food in this package is 1 bar  Each serving (1 bar) contains 25 grams of carbohydrate  The total amount of carbohydrate in this package of granola bars would be 50 g  Based on your meal plan, you should eat only 1 bar  Follow up with your healthcare provider as directed:  Write down your questions so you remember to ask them during your visits  © 2017 [employer] Information is for End User's use only and may not be sold, redistributed or otherwise used for commercial purposes  All illustrations and images included in CareNotes® are the copyrighted property of A D A M [employer] Inc  or Emanuel Grullon  The above information is an  only  It is not intended as medical advice for individual conditions or treatments  Talk to your doctor, nurse or pharmacist before following any medical regimen to see if it is safe and effective for you  Medicare Preventive Visit Patient Instructions  Thank you for completing your Welcome to Medicare Visit or Medicare Annual Wellness Visit today  Your next wellness visit will be due in one year (1/22/2021)  The screening/preventive services that you may require over the next 5-10 years are detailed below  Some tests may not apply to you based off risk factors and/or age  Screening tests ordered at today's visit but not completed yet may show as past due  Also, please note that scanned in results may not display below    Preventive Screenings:  Service Recommendations Previous Testing/Comments   Colorectal Cancer Screening  · Colonoscopy    · Fecal Occult Blood Test (FOBT)/Fecal Immunochemical Test (FIT)  · Fecal DNA/Cologuard Test  · Flexible Sigmoidoscopy Age: 54-65 years old   Colonoscopy: every 10 years (May be performed more frequently if at higher risk)  OR  FOBT/FIT: every 1 year  OR  Cologuard: every 3 years  OR  Sigmoidoscopy: every 5 years  Screening may be recommended earlier than age 48 if at higher risk for colorectal cancer  Also, an individualized decision between you and your healthcare provider will decide whether screening between the ages of 74-80 would be appropriate  Colonoscopy: 10/27/2016  FOBT/FIT: Not on file  Cologuard: Not on file  Sigmoidoscopy: Not on file    Screening Current     Prostate Cancer Screening Individualized decision between patient and health care provider in men between ages of 53-78   Medicare will cover every 12 months beginning on the day after your 50th birthday PSA: 1 9 ng/mL     Screening Current     Hepatitis C Screening Once for adults born between 1945 and 1965  More frequently in patients at high risk for Hepatitis C Hep C Antibody: 11/26/2018    Screening Current   Diabetes Screening 1-2 times per year if you're at risk for diabetes or have pre-diabetes Fasting glucose: 123 mg/dL   A1C: 5 8 %    Screening Not Indicated  Screening Current   Cholesterol Screening Once every 5 years if you don't have a lipid disorder  May order more often based on risk factors  Lipid panel: 12/27/2019    History Lipid Disorder  Screening Current      Other Preventive Screenings Covered by Medicare:  1  Abdominal Aortic Aneurysm (AAA) Screening: covered once if your at risk  You're considered to be at risk if you have a family history of AAA or a male between the age of 73-68 who smoking at least 100 cigarettes in your lifetime    2  Lung Cancer Screening: covers low dose CT scan once per year if you meet all of the following conditions: (1) Age 55-77; (2) No signs or symptoms of lung cancer; (3) Current smoker or have quit smoking within the last 15 years; (4) You have a tobacco smoking history of at least 30 pack years (packs per day x number of years you smoked); (5) You get a written order from a healthcare provider  3  Glaucoma Screening: covered annually if you're considered high risk: (1) You have diabetes OR (2) Family history of glaucoma OR (3)  aged 48 and older OR (3)  American aged 72 and older  3  Osteoporosis Screening: covered every 2 years if you meet one of the following conditions: (1) Have a vertebral abnormality; (2) On glucocorticoid therapy for more than 3 months; (3) Have primary hyperparathyroidism; (4) On osteoporosis medications and need to assess response to drug therapy  5  HIV Screening: covered annually if you're between the age of 12-76  Also covered annually if you are younger than 13 and older than 72 with risk factors for HIV infection  For pregnant patients, it is covered up to 3 times per pregnancy  Immunizations:  Immunization Recommendations   Influenza Vaccine Annual influenza vaccination during flu season is recommended for all persons aged >= 6 months who do not have contraindications   Pneumococcal Vaccine (Prevnar and Pneumovax)  * Prevnar = PCV13  * Pneumovax = PPSV23 Adults 25-60 years old: 1-3 doses may be recommended based on certain risk factors  Adults 72 years old: Prevnar (PCV13) vaccine recommended followed by Pneumovax (PPSV23) vaccine  If already received PPSV23 since turning 65, then PCV13 recommended at least one year after PPSV23 dose  Hepatitis B Vaccine 3 dose series if at intermediate or high risk (ex: diabetes, end stage renal disease, liver disease)   Tetanus (Td) Vaccine - COST NOT COVERED BY MEDICARE PART B Following completion of primary series, a booster dose should be given every 10 years to maintain immunity against tetanus   Td may also be given as tetanus wound prophylaxis  Tdap Vaccine - COST NOT COVERED BY MEDICARE PART B Recommended at least once for all adults  For pregnant patients, recommended with each pregnancy  Shingles Vaccine (Shingrix) - COST NOT COVERED BY MEDICARE PART B  2 shot series recommended in those aged 48 and above     Health Maintenance Due:      Topic Date Due    CRC Screening: Colonoscopy  10/27/2019    Hepatitis C Screening  Completed     Immunizations Due:      Topic Date Due    Pneumococcal Vaccine: Pediatrics (0 to 5 Years) and At-Risk Patients (6 to 59 Years) (1 of 1 - PPSV23) 05/28/1966     Advance Directives   What are advance directives? Advance directives are legal documents that state your wishes and plans for medical care  These plans are made ahead of time in case you lose your ability to make decisions for yourself  Advance directives can apply to any medical decision, such as the treatments you want, and if you want to donate organs  What are the types of advance directives? There are many types of advance directives, and each state has rules about how to use them  You may choose a combination of any of the following:  · Living will: This is a written record of the treatment you want  You can also choose which treatments you do not want, which to limit, and which to stop at a certain time  This includes surgery, medicine, IV fluid, and tube feedings  · Durable power of  for healthcare [facility]): This is a written record that states who you want to make healthcare choices for you when you are unable to make them for yourself  This person, called a proxy, is usually a family member or a friend  You may choose more than 1 proxy  · Do not resuscitate (DNR) order:  A DNR order is used in case your heart stops beating or you stop breathing  It is a request not to have certain forms of treatment, such as CPR  A DNR order may be included in other types of advance directives  · Medical directive:   This covers the care that you want if you are in a coma, near death, or unable to make decisions for yourself  You can list the treatments you want for each condition  Treatment may include pain medicine, surgery, blood transfusions, dialysis, IV or tube feedings, and a ventilator (breathing machine)  · Values history: This document has questions about your views, beliefs, and how you feel and think about life  This information can help others choose the care that you would choose  Why are advance directives important? An advance directive helps you control your care  Although spoken wishes may be used, it is better to have your wishes written down  Spoken wishes can be misunderstood, or not followed  Treatments may be given even if you do not want them  An advance directive may make it easier for your family to make difficult choices about your care  Cigarette Smoking and Your Health   Risks to your health if you smoke:  Nicotine and other chemicals found in tobacco damage every cell in your body  Even if you are a light smoker, you have an increased risk for cancer, heart disease, and lung disease  If you are pregnant or have diabetes, smoking increases your risk for complications  Benefits to your health if you stop smoking:   · You decrease respiratory symptoms such as coughing, wheezing, and shortness of breath  · You reduce your risk for cancers of the lung, mouth, throat, kidney, bladder, pancreas, stomach, and cervix  If you already have cancer, you increase the benefits of chemotherapy  You also reduce your risk for cancer returning or a second cancer from developing  · You reduce your risk for heart disease, blood clots, heart attack, and stroke  · You reduce your risk for lung infections, and diseases such as pneumonia, asthma, chronic bronchitis, and emphysema  · Your circulation improves  More oxygen can be delivered to your body   If you have diabetes, you lower your risk for complications, such as kidney, artery, and eye diseases  You also lower your risk for nerve damage  Nerve damage can lead to amputations, poor vision, and blindness  · You improve your body's ability to heal and to fight infections  For more information and support to stop smoking:   · [employer]  Phone: 7- [phone]  Web Address: [employer]  How to Quit Using Smokeless Tobacco   Why it is important to stop using smokeless tobacco:  Smokeless tobacco comes in many forms  Examples include chew, snuff, dip, dissolvable tobacco, and snus  All smokeless tobacco products contain nicotine and may contain as much nicotine as 3 cigarettes  You may be physically dependent on nicotine  You may also be emotionally addicted to it  The cravings can be strong, but it is important to quit using smokeless tobacco  You will improve your health and decrease your cancer, stroke, and heart attack risk  Mouth sores and tooth problems will also improve when you quit  You can benefit from quitting no matter how long you have used smokeless tobacco    Prepare to stop using smokeless tobacco:  Nicotine is a highly addictive drug  Withdrawal symptoms can happen when you stop and make it hard to quit  The following can help keep you on track:  · Set a quit date  · Tell friends, family, and coworkers that you plan to quit  · Remove all smokeless tobacco products from your home, car, and workplace  Manage weight gain after you quit:  Nicotine can affect your metabolism  You may gain a few pounds after you quit  The following can help you control your weight:  · Eat healthy foods  · Drink water before, during, and between meals  · Exercise as directed  Weight Management   Why it is important to manage your weight:  Being overweight increases your risk of health conditions such as heart disease, high blood pressure, type 2 diabetes, and certain types of cancer   It can also increase your risk for osteoarthritis, sleep apnea, and other respiratory problems  Aim for a slow, steady weight loss  Even a small amount of weight loss can lower your risk of health problems  How to lose weight safely:  A safe and healthy way to lose weight is to eat fewer calories and get regular exercise  You can lose up about 1 pound a week by decreasing the number of calories you eat by 500 calories each day  Healthy meal plan for weight management:  A healthy meal plan includes a variety of foods, contains fewer calories, and helps you stay healthy  A healthy meal plan includes the following:  · Eat whole-grain foods more often  A healthy meal plan should contain fiber  Fiber is the part of grains, fruits, and vegetables that is not broken down by your body  Whole-grain foods are healthy and provide extra fiber in your diet  Some examples of whole-grain foods are whole-wheat breads and pastas, oatmeal, brown rice, and bulgur  · Eat a variety of vegetables every day  Include dark, leafy greens such as spinach, kale, ney greens, and mustard greens  Eat yellow and orange vegetables such as carrots, sweet potatoes, and winter squash  · Eat a variety of fruits every day  Choose fresh or canned fruit (canned in its own juice or light syrup) instead of juice  Fruit juice has very little or no fiber  · Eat low-fat dairy foods  Drink fat-free (skim) milk or 1% milk  Eat fat-free yogurt and low-fat cottage cheese  Try low-fat cheeses such as mozzarella and other reduced-fat cheeses  · Choose meat and other protein foods that are low in fat  Choose beans or other legumes such as split peas or lentils  Choose fish, skinless poultry (chicken or turkey), or lean cuts of red meat (beef or pork)  Before you cook meat or poultry, cut off any visible fat  · Use less fat and oil  Try baking foods instead of frying them  Add less fat, such as margarine, sour cream, regular salad dressing and mayonnaise to foods  Eat fewer high-fat foods   Some examples of high-fat foods include french fries, doughnuts, ice cream, and cakes  · Eat fewer sweets  Limit foods and drinks that are high in sugar  This includes candy, cookies, regular soda, and sweetened drinks  Exercise:  Exercise at least 30 minutes per day on most days of the week  Some examples of exercise include walking, biking, dancing, and swimming  You can also fit in more physical activity by taking the stairs instead of the elevator or parking farther away from stores  Ask your healthcare provider about the best exercise plan for you  © Copyright [employer] 2018 Information is for End User's use only and may not be sold, redistributed or otherwise used for commercial purposes  All illustrations and images included in CareNotes® are the copyrighted property of [employer]  or  S  [facility] Vacc[facility] for Ad[facility]   WHAT YOU NEED TO KNOW:   The pneumococcal vaccine is an injection given to protect you from pneumococcal disease  Pneumococcal disease is an infection caused by pneumococcal bacteria  The infection may cause pneumonia or an ear infection  Pneumococcal disease is spread from person to person through coughing and sneezing  You may be given the pneumococcal conjugate vaccine (PCV) or the pneumococcal polysaccharide vaccine (PPSV)  DISCHARGE INSTRUCTIONS:   Call 911 for any of the following:   · Your mouth and throat are swollen  · You are wheezing or have trouble breathing  · You have chest pain or your heart is beating faster than normal for you  · You feel like you are going to faint  Return to the emergency department if:   · Your face is red or swollen  · You have hives that spread over your body  · You feel weak or dizzy  Contact your healthcare provider if:   · You have a fever  · You have swollen or painful lymph nodes in your neck  · You have increased pain, redness, or swelling around the area where the shot was given      · You have questions or concerns about the pneumococcal vaccine  Apply a warm compress  to the injection area as directed to decrease pain and swelling  Follow up with your healthcare provider as directed:  Write down your questions so you remember to ask them during your visits  © 2017 [employer] Information is for End User's use only and may not be sold, redistributed or otherwise used for commercial purposes  All illustrations and images included in CareNotes® are the copyrighted property of [employer] M [employer]  or Emanuel [employer]ull[employer]  The above information is an  only  It is not intended as medical advice for individual conditions or treatments  Talk to your doctor, nurse or pharmacist before following any medical regimen to see if it is safe and effective for you

## 2020-01-22 NOTE — ASSESSMENT & PLAN NOTE
PSA: 1 2 - 11/26/18  1 9 - 12/27/19  See review of systems  He is having issues with urgency and frequency of his urination and also nocturia x2  His PSA levels have been good  When I see him back to flush his ears we will also do BARRERA

## 2020-01-22 NOTE — PROGRESS NOTES
Assessment/Plan:    Type 2 diabetes mellitus with circulatory disorder, without long-term current use of insulin (HCC)    Lab Results   Component Value Date    HGBA1C 5 8 12/27/2019 06/07/2019-hemoglobin A1c was 5 6%  12/27/2019-CMP, CBC, and TSH level are within normal limits  FBS equals 123  Home blood sugar checks have been of very good  Remains on metformin a 1000 mg b i d   No change in medications  Discussed diet and weight reduction again today  Benign essential hypertension  At the doctor's offices it has been good  Labs reviewed from 12/27/2019  No home blood sugar pressure checks but when he gets it checked at the doctor's offices it has been good  Blood pressure is controlled on present treatment regimen  Patient misses no doses and tolerates the meds without side effects  Patient avoids salt  Discussed diet, exercise and weight control  No change in present meds  Continue HBPM     Mixed hyperlipidemia  Past attempt to increase atorvastatin did not work due to myalgia  Zetia started on 9/16/19 12/27/19 - HDL/LDL/TG = 43/66/166 - on atorvastatin 20mg + Zetia 10mg  Continue atorvastatin and Zetia as prescribed  Discussed diet and weight reduction again today  Benign prostatic hyperplasia without lower urinary tract symptoms  PSA: 1 2 - 11/26/18  1 9 - 12/27/19  See review of systems  He is having issues with urgency and frequency of his urination and also nocturia x2  His PSA levels have been good  When I see him back to flush his ears we will also do BARRERA  Hemiparesis affecting left side as late effect of cerebrovascular accident [facility])  Followed by neurology-Dr Katerin Mcclure  Last neurology visit was 12/23/2019  Reviewed labs from 12/27/2019  No change in neurologic exam today  Situation is stable and patient remains self ambulatory without single-point cane      Hyperplastic colonic polyp  Will be stopping by the GI office to set up the appointment for colonoscopy  Overweight  Has lost 2 lb since his visit of 09/2019  No exercise but tries to watch his carbohydrates  Has not been counting carbohydrates quantitatively  Tobacco abuse  01/22/2020-still smoking for cigars per day  Absolutely, needs to quit smoking  His plan is to quit cold turkey and use nicotine lozenges  I encouraged him to definitely do so  Carotid stenosis, bilateral  Was followed by vascular doctor-Dr Gage Oro, until his [facility] living in 2018  Needs referral to a vascular doctor for follow-up of his carotids  Will order a carotid duplex scan in the meantime  Diagnoses and all orders for this visit:    Benign essential hypertension    Screening for viral disease  -     HIV 1/2 AG-AB combo; Future    Major depressive disorder with single episode, in partial remission (HCC)    Depressive disorder    Benign prostatic hyperplasia without lower urinary tract symptoms    Tobacco abuse    Overweight    Hemiparesis affecting left side as late effect of cerebrovascular accident [facility])  -     Ambulatory referral to Vascular Surgery; Future    BMI 27 0-27 9,adult    Need for vaccination  -     Pneumococcal Polysaccharide Vaccine 23-Valent =>1yo SQ IM    Type 2 diabetes mellitus with other circulatory complication, without long-term current use of insulin (HCC)    Carotid stenosis, bilateral  -     Ambulatory referral to Vascular Surgery; Future  -     VAS carotid complete study; Future    Bilateral impacted cerumen  Comments:  See review of systems and physical exam   Will have him use Debrox ear drops twice a day for 5 days before we flushed his ears within the next 2 weeks  Subjective:     Chief Complaint   Patient presents with    Medicare Wellness Visit        Patient ID: Pratik Juarez is a 64 y o  male  HPI :  Medicare annual wellness visit and chronic medical problems      The following portions of the patient's history were reviewed and updated as appropriate: allergies, current medications, past family history, past medical history, past social history, past surgical history and problem list     Review of Systems   Constitutional: Negative for activity change, appetite change, fatigue, fever and unexpected weight change  HENT: Negative for congestion, dental problem and sneezing  Concerned about decreased hearing and feeling of closure in both ears  Eyes: Negative for discharge and visual disturbance  Had his last eye check in 09/2019  No abnormalities  Respiratory: Negative for cough, chest tightness, shortness of breath and wheezing  Cardiovascular: Negative for chest pain, palpitations and leg swelling  Gastrointestinal: Negative for abdominal pain, constipation, diarrhea, nausea and vomiting  Endocrine: Negative for polydipsia and polyuria  Genitourinary: Positive for frequency and urgency  Negative for dysuria  Has noticed increased frequency of urination during the day and has nocturia x2  Admits that he does drink coffee during the day but wonders about his prostate  His PSA level is normal    Musculoskeletal: Negative for arthralgias  Skin: Negative for rash  Allergic/Immunologic: Negative for environmental allergies and food allergies  Neurological: Negative for headaches  Hematological: Negative for adenopathy  Psychiatric/Behavioral: Negative for behavioral problems and sleep disturbance  Objective:  Vitals:    01/22/20 1245   BP: 132/68   BP Location: Left arm   Patient Position: Sitting   Cuff Size: Standard   Pulse: 78   Resp: 18   Temp: 98 8 °F (37 1 °C)   TempSrc: Temporal   SpO2: 96%   Weight: 84 6 kg (186 lb 9 6 oz)   Height: 5' 8 5" (1 74 m)      Physical Exam   Constitutional: He is oriented to person, place, and time  He appears well-developed and well-nourished  HENT:   Head: Normocephalic  Right Ear: Decreased hearing is noted  Left Ear: Decreased hearing is noted     Ears:    Nose: Nose normal  Mouth/Throat: Oropharynx is clear and moist    Eyes: Pupils are equal, round, and reactive to light  Conjunctivae are normal  Right eye exhibits no discharge  Left eye exhibits no discharge  Neck: Normal range of motion  Neck supple  No thyromegaly present  Cardiovascular: Normal rate, regular rhythm and normal heart sounds  No murmur heard  Pulmonary/Chest: Effort normal and breath sounds normal  He has no wheezes  He has no rales  Abdominal: Soft  Bowel sounds are normal  He exhibits no mass  There is no tenderness  Musculoskeletal: Normal range of motion  He exhibits no edema or tenderness  Lymphadenopathy:     He has no cervical adenopathy  Neurological: He is alert and oriented to person, place, and time  Skin: Skin is warm  No rash noted  Psychiatric: He has a normal mood and affect   His behavior is normal  Judgment and thought content normal

## 2020-01-22 NOTE — ASSESSMENT & PLAN NOTE
At the doctor's offices it has been good  Labs reviewed from 12/27/2019  No home blood sugar pressure checks but when he gets it checked at the doctor's offices it has been good  Blood pressure is controlled on present treatment regimen  Patient misses no doses and tolerates the meds without side effects  Patient avoids salt  Discussed diet, exercise and weight control  No change in present meds   Continue HBPM

## 2020-01-22 NOTE — ASSESSMENT & PLAN NOTE
Lab Results   Component Value Date    HGBA1C 5 8 12/27/2019 06/07/2019-hemoglobin A1c was 5 6%  12/27/2019-CMP, CBC, and TSH level are within normal limits  FBS equals 123  Home blood sugar checks have been of very good  Remains on metformin a 1000 mg b i d   No change in medications  Discussed diet and weight reduction again today

## 2020-01-22 NOTE — ASSESSMENT & PLAN NOTE
Past attempt to increase atorvastatin did not work due to myalgia  Zetia started on 9/16/19 12/27/19 - HDL/LDL/TG = 43/66/166 - on atorvastatin 20mg + Zetia 10mg  Continue atorvastatin and Zetia as prescribed  Discussed diet and weight reduction again today

## 2020-01-22 NOTE — ASSESSMENT & PLAN NOTE
Followed by neurology-Dr Nicole Turcios  Last neurology visit was 12/23/2019  Reviewed labs from 12/27/2019  No change in neurologic exam today  Situation is stable and patient remains self ambulatory without single-point cane

## 2020-01-22 NOTE — PROGRESS NOTES
oneumo Assessment and Plan:     Problem List Items Addressed This Visit        Cardiovascular and Mediastinum    Benign essential hypertension - Primary     Labs reviewed from 12/27/2019  Blood pressure is controlled on present treatment regimen  Patient misses no doses and tolerates the meds without side effects  Patient avoids salt  Discussed diet, exercise and weight control  No change in present meds  Continue HBPM             Nervous and Auditory    Hemiparesis affecting left side as late effect of cerebrovascular accident [facility])     Followed by neurology-Dr Jb Mckeon  Genitourinary    Benign prostatic hyperplasia without lower urinary tract symptoms     PSA: 1 2 - 11/26/18  1 9 - 12/27/19  Other    Tobacco abuse    Depressive disorder    Overweight    Major depressive disorder with single episode, in partial remission ([facility] )      Other Visit Diagnoses     Screening for viral disease        Relevant Orders    HIV 1/2 AG-AB combo           Preventive health issues were discussed with patient, and age appropriate screening tests were ordered as noted in patient's After Visit Summary  Personalized health advice and appropriate referrals for health education or preventive services given if needed, as noted in patient's After Visit Summary       History of Present Illness:     Patient presents for Medicare Annual Wellness visit    Patient Care Team:  Luz Marina Mehta MD as PCP - General (Family Medicine)  Luz Marina Mehta MD as PCP - [address] [place], [place] [address] [facility]  José Miguel Delarosa MD     Problem List:     Patient Active Problem List   Diagnosis    Other seizures ([facility] )    Hemiparesis affecting left side as late effect of cerebrovascular accident [facility])    Carotid stenosis, bilateral    Tobacco abuse    Benign essential hypertension    Cerebral artery occlusion with cerebral infarction ([facility] )    Depressive disorder    Type 2 diabetes mellitus with circulatory disorder, without long-term current use of insulin (HCC)    Generalized anxiety disorder    History of cerebrovascular accident with residual deficit    Mixed hyperlipidemia    Overweight    Patent foramen ovale    Hyperplastic colonic polyp    Benign prostatic hyperplasia without lower urinary tract symptoms    Major depressive disorder with single episode, in partial remission ([facility] )    Adjustment disorder with depressed mood      Past Medical and Surgical History:     Past Medical History:   Diagnosis Date    CAD (coronary artery disease) 01/15/2014    100% occlusion of right    CVA (cerebral vascular accident) ([facility] ) 01/15/2014    Depressive disorder     Diabetes ([place] [address] )     Dyslipidemia     Erectile dysfunction 2009    Hemiparesis ([facility] ) 01/15/2014    left    Hypertension     Seizure ([facility] ) 12/14/2014    Stroke ([place] [address] )      Past Surgical History:   Procedure Laterality Date    NO PAST SURGERIES        Family History:     Family History   Problem Relation Age of Onset    Hypertension Family     Diabetes Family     Cancer Mother     Diabetes Mother     Diabetes Father     Diabetes Brother       Social History:     Social History     Socioeconomic History    Marital status: Single     Spouse name: Not on file    Number of children: Not on file    Years of education: Not on file    Highest education level: Not on file   Occupational History    Not on file   Social Needs    Financial resource strain: Not on file    Food insecurity:     Worry: Not on file     Inability: Not on file    Transportation needs:     Medical: Not on file     Non-medical: Not on file   Tobacco Use    Smoking status: Current Every Day Smoker     Packs/day: 0 50     Types: Cigars, Cigarettes    Smokeless tobacco: Current User    Tobacco comment: heavy tob smoke - 3 cigars day - quit 1/1/14- no passive smoke exposure as per [employer]   Substance and Sexual Activity    Alcohol use: Yes     Comment: occasional    Drug use: No    Sexual activity: Not Currently   Lifestyle    Physical activity:     Days per week: Not on file     Minutes per session: Not on file    Stress: Not on file   Relationships    Social connections:     Talks on phone: Not on file     Gets together: Not on file     Attends Restorationist service: Not on file     Active member of club or organization: Not on file     Attends meetings of clubs or organizations: Not on file     Relationship status: Not on file    Intimate partner violence:     Fear of current or ex partner: Not on file     Emotionally abused: Not on file     Physically abused: Not on file     Forced sexual activity: Not on file   Other Topics Concern    Not on file   Social History Narrative    Not on file       Medications and Allergies:     Current Outpatient Medications   Medication Sig Dispense Refill    aspirin 81 mg chewable tablet Chew 1 tablet every 24 hours      atorvastatin (LIPITOR) 20 mg tablet TAKE 1 TABLET BY MOUTH EVERY DAY 90 tablet 2    ezetimibe (ZETIA) 10 mg tablet Take 1 tablet (10 mg total) by mouth daily 90 tablet 3    gabapentin (NEURONTIN) 100 mg capsule TAKE 1 CAPSULE BY MOUTH TWICE A  capsule 1    glucose blood test strip Use as instructed  ONE TOUCH ULTRA 100 each 5    Lancets (ONETOUCH ULTRASOFT) lancets daily      levETIRAcetam (KEPPRA) 1000 MG tablet Take 1 tablet (1,000 mg total) by mouth 2 (two) times a day 60 tablet 5    LORazepam (ATIVAN) 1 mg tablet Take 1 tablet (1 mg total) by mouth 2 (two) times a day Only as needed for anxiety  30 tablet 1    losartan (COZAAR) 50 mg tablet Take 1 tablet (50 mg total) by mouth daily 90 tablet 3    metFORMIN (GLUCOPHAGE) 1000 MG tablet TAKE 1 TABLET BY MOUTH TWICE A DAY WITH MEALS 180 tablet 2    sertraline (ZOLOFT) 100 mg tablet TAKE 1 TABLET BY MOUTH EVERY DAY 90 tablet 1    sertraline (ZOLOFT) 25 mg tablet Take 1 tablet (25 mg total) by mouth daily 90 tablet 3     No current facility-administered medications for this visit  Allergies   Allergen Reactions    No Active Allergies     No Known Allergies       Immunizations:     Immunization History   Administered Date(s) Administered    INFLUENZA 09/16/2015, 09/26/2016, 09/26/2016, 09/14/2017, 09/14/2017, 11/12/2018    Influenza TIV (IM) 12/15/2014, 09/16/2015    Influenza, injectable, quadrivalent, preservative free 0 5 mL 09/16/2019    Influenza, recombinant, quadrivalent,injectable, preservative free 11/12/2018    Tdap 09/14/2012      Health Maintenance:         Topic Date Due    CRC Screening: Colonoscopy  10/27/2019    Hepatitis C Screening  Completed         Topic Date Due    Pneumococcal Vaccine: Pediatrics (0 to 5 Years) and At-Risk Patients (6 to 59 Years) (1 of 1 - PPSV23) 05/28/1966      Medicare Health Risk Assessment:     /68 (BP Location: Left arm, Patient Position: Sitting, Cuff Size: Standard)   Pulse 78   Temp 98 8 °F (37 1 °C) (Temporal)   Resp 18   Ht 5' 8 5" (1 74 m)   Wt 84 6 kg (186 lb 9 6 oz)   SpO2 96%   BMI 27 96 kg/m²      Rebecca Arzola is here for his Initial Wellness visit  Health Risk Assessment:   Patient rates overall health as very good  Patient feels that their physical health rating is same  Eyesight was rated as same  Hearing was rated as same  Patient feels that their emotional and mental health rating is same  Pain experienced in the last 7 days has been none  Patient states that he has experienced no weight loss or gain in last 6 months  Depression Screening:   PHQ-2 Score: 0  PHQ-9 Score: 2      Fall Risk Screening: In the past year, patient has experienced: no history of falling in past year      Home Safety:  Patient does not have trouble with stairs inside or outside of their home  Patient has working smoke alarms and has working carbon monoxide detector  Home safety hazards include: none  Nutrition:   Current diet is Low Carb  Medications:   Patient is currently taking over-the-counter supplements   OTC medications include: see medication list  Patient is able to manage medications  Activities of Daily Living (ADLs)/Instrumental Activities of Daily Living (IADLs):   Walk and transfer into and out of bed and chair?: Yes  Dress and groom yourself?: Yes    Bathe or shower yourself?: Yes    Feed yourself? Yes  Do your laundry/housekeeping?: Yes  Manage your money, pay your bills and track your expenses?: Yes  Make your own meals?: Yes    Do your own shopping?: Yes    Previous Hospitalizations:   Any hospitalizations or ED visits within the last 12 months?: No      Advance Care Planning:   Living will: No    Durable POA for healthcare: No    Advanced directive: No    Advanced directive counseling given: Yes    Five wishes given: Yes      Cognitive Screening:   Provider or family/friend/caregiver concerned regarding cognition?: No    PREVENTIVE SCREENINGS      Cardiovascular Screening:    General: History Lipid Disorder and Screening Current      Diabetes Screening:     General: Screening Not Indicated and Screening Current      Colorectal Cancer Screening:     General: Screening Current      Prostate Cancer Screening:    General: Screening Current      Osteoporosis Screening:    General: Risks and Benefits Discussed and Screening Not Indicated      Abdominal Aortic Aneurysm (AAA) Screening:    Risk factors include: tobacco use        General: Risks and Benefits Discussed and Screening Not Indicated      Lung Cancer Screening:     General: Risks and Benefits Discussed and Screening Not Indicated      Hepatitis C Screening:    General: Screening Current      Preventive Screening Comments: Started smoking cigarettes at age 29  Quit smoking at age 48  At the most was smoking half a pack per day  He had his stroke at age 48  He has been smoking 4-5 cigars per day over the past 5 years  Patient is not yet at age 74 to do ultrasound screening for abdominal aortic aneurysm      Other Counseling Topics:   Alcohol use counseling, car/seat belt/driving safety and calcium and vitamin D intake and regular weightbearing exercise  Mary Newell MD BMI Counseling: Body mass index is 27 96 kg/m²  The BMI is above normal  Nutrition recommendations include reducing portion sizes, decreasing overall calorie intake, 3-5 servings of fruits/vegetables daily, reducing fast food intake, consuming healthier snacks, decreasing soda and/or juice intake, moderation in carbohydrate intake, increasing intake of lean protein, reducing intake of saturated fat and trans fat and reducing intake of cholesterol  Exercise recommendations include moderate aerobic physical activity for 150 minutes/week, exercising 3-5 times per week and strength training exercises

## 2020-01-22 NOTE — ASSESSMENT & PLAN NOTE
Was followed by vascular doctor-Dr Shree Cottrell, until his [facility] in 2018  Needs referral to a vascular doctor for follow-up of his carotids  Will order a carotid duplex scan in the meantime

## 2020-02-13 ENCOUNTER — OFFICE VISIT (OUTPATIENT)
Dept: FAMILY MEDICINE CLINIC | Facility: CLINIC | Age: 60
End: 2020-02-13
Payer: COMMERCIAL

## 2020-02-13 VITALS
HEART RATE: 74 BPM | OXYGEN SATURATION: 94 % | HEIGHT: 68 IN | TEMPERATURE: 98.2 F | WEIGHT: 184.8 LBS | DIASTOLIC BLOOD PRESSURE: 80 MMHG | BODY MASS INDEX: 28.01 KG/M2 | SYSTOLIC BLOOD PRESSURE: 138 MMHG

## 2020-02-13 DIAGNOSIS — H61.23 BILATERAL IMPACTED CERUMEN: ICD-10-CM

## 2020-02-13 DIAGNOSIS — E78.2 MIXED HYPERLIPIDEMIA: ICD-10-CM

## 2020-02-13 DIAGNOSIS — E11.59 TYPE 2 DIABETES MELLITUS WITH OTHER CIRCULATORY COMPLICATION, WITHOUT LONG-TERM CURRENT USE OF INSULIN (HCC): ICD-10-CM

## 2020-02-13 DIAGNOSIS — N40.0 BENIGN PROSTATIC HYPERPLASIA WITHOUT LOWER URINARY TRACT SYMPTOMS: Primary | ICD-10-CM

## 2020-02-13 PROCEDURE — 3075F SYST BP GE 130 - 139MM HG: CPT | Performed by: FAMILY MEDICINE

## 2020-02-13 PROCEDURE — 3079F DIAST BP 80-89 MM HG: CPT | Performed by: FAMILY MEDICINE

## 2020-02-13 PROCEDURE — 3008F BODY MASS INDEX DOCD: CPT | Performed by: FAMILY MEDICINE

## 2020-02-13 PROCEDURE — 99213 OFFICE O/P EST LOW 20 MIN: CPT | Performed by: FAMILY MEDICINE

## 2020-02-13 PROCEDURE — 69210 REMOVE IMPACTED EAR WAX UNI: CPT | Performed by: FAMILY MEDICINE

## 2020-02-13 PROCEDURE — 69209 REMOVE IMPACTED EAR WAX UNI: CPT | Performed by: FAMILY MEDICINE

## 2020-02-13 NOTE — PROGRESS NOTES
Assessment/Plan:    Benign prostatic hyperplasia without lower urinary tract symptoms  Patient's denies any urinary urgency or frequency since he gave up coffee  Nocturia has decreased  We will not do BARRERA today to check his prostate  Type 2 diabetes mellitus with circulatory disorder, without long-term current use of insulin ([facility])    Lab Results   Component Value Date    HGBA1C 5 8 12/27/2019   Due for labs in 6 months  Diagnoses and all orders for this visit:    Benign prostatic hyperplasia without lower urinary tract symptoms    Bilateral impacted cerumen  Comments:  Has used Debrox ear drops in both ears before flushing today  Mixed hyperlipidemia  -     Lipid Panel with Direct LDL reflex; Future    Type 2 diabetes mellitus with other circulatory complication, without long-term current use of insulin ([facility])  -     Comprehensive metabolic panel; Future  -     Hemoglobin A1C; Future  -     UA (URINE) with reflex to Scope    Other orders  -     Ear cerumen removal          Subjective:     Chief Complaint   Patient presents with    Earache     Ear drainge         Patient ID: Rosa Elena Benedict is a 64 y o  male  HPI : Multiple Chronic Medical Conditions    The following portions of the patient's history were reviewed and updated as appropriate: allergies, current medications, past family history, past medical history, past social history, past surgical history and problem list     Review of Systems   Constitutional: Negative for chills and fever  HENT: Positive for hearing loss  Negative for ear discharge and ear pain  Cardiovascular: Negative for chest pain, palpitations and leg swelling  Genitourinary: Negative for difficulty urinating, dysuria, enuresis, frequency and urgency           Objective:  Vitals:    02/13/20 0935   BP: 138/80   Pulse: 74   Temp: 98 2 °F (36 8 °C)   TempSrc: Tympanic   SpO2: 94%   Weight: 83 8 kg (184 lb 12 8 oz)   Height: 5' 8" (1 727 m)      Physical Exam Constitutional: He appears well-developed and well-nourished  HENT:   Head: Normocephalic  Right Ear: Decreased hearing is noted  Left Ear: Decreased hearing is noted  Ears:    Cardiovascular: Normal rate, regular rhythm and normal heart sounds  Musculoskeletal: He exhibits no edema  Ear cerumen removal  Date/Time: 2/13/2020 12:38 PM  Performed by: Cassidy Dunn MD  Authorized by: Cassidy Dunn MD     Patient location:  Clinic  Other Assisting Provider: Yes (comment)    Consent:     Consent obtained:  Verbal    Consent given by:  Patient    Risks discussed:  Dizziness    Alternatives discussed:  No treatment  Universal protocol:     Procedure explained and questions answered to patient or proxy's satisfaction: yes      Patient identity confirmed:  Verbally with patient  Procedure details:     Local anesthetic:  None    Location:  R ear and L ear    Procedure type: irrigation with instrumentation      Instrumentation: curette      Approach:  Natural orifice  Post-procedure details:     Complication:  None    Hearing quality:  Improved    Patient tolerance of procedure:   Tolerated well, no immediate complications

## 2020-02-13 NOTE — ASSESSMENT & PLAN NOTE
Patient's denies any urinary urgency or frequency since he gave up coffee  Nocturia has decreased  We will not do BARRERA today to check his prostate

## 2020-02-19 DIAGNOSIS — F41.1 GENERALIZED ANXIETY DISORDER: ICD-10-CM

## 2020-02-19 RX ORDER — LORAZEPAM 1 MG/1
1 TABLET ORAL 2 TIMES DAILY
Qty: 30 TABLET | Refills: 1 | Status: SHIPPED | OUTPATIENT
Start: 2020-02-19 | End: 2020-04-03 | Stop reason: SDUPTHER

## 2020-02-19 NOTE — TELEPHONE ENCOUNTER
Patient would like to refill lorazepam prescription to [facility]

## 2020-02-26 ENCOUNTER — DOCUMENTATION (OUTPATIENT)
Dept: FAMILY MEDICINE CLINIC | Facility: CLINIC | Age: 60
End: 2020-02-26

## 2020-03-09 ENCOUNTER — TELEPHONE (OUTPATIENT)
Dept: FAMILY MEDICINE CLINIC | Facility: CLINIC | Age: 60
End: 2020-03-09

## 2020-03-09 NOTE — TELEPHONE ENCOUNTER
Left message to call back -  Request form was received from [address] [place] [address],[address] for Diabetes medication and testing supplies - need to know if patient requested this from [address] [place] [address],[address] [place] [address], also need to find out if patient is requesting topical pain medication

## 2020-03-10 ENCOUNTER — TELEPHONE (OUTPATIENT)
Dept: FAMILY MEDICINE CLINIC | Facility: CLINIC | Age: 60
End: 2020-03-10

## 2020-03-10 NOTE — TELEPHONE ENCOUNTER
Got request from [address] for metformin refill  Does patient use this pharmacy?   Left message for patient to verify

## 2020-03-10 NOTE — TELEPHONE ENCOUNTER
Pt called stating that [facility] contacted him and sometimes he does use icy hot for his back and he states that this does help

## 2020-03-26 DIAGNOSIS — F43.21 ADJUSTMENT DISORDER WITH DEPRESSED MOOD: ICD-10-CM

## 2020-03-26 RX ORDER — SERTRALINE HYDROCHLORIDE 25 MG/1
25 TABLET, FILM COATED ORAL DAILY
Qty: 90 TABLET | Refills: 3 | Status: SHIPPED | OUTPATIENT
Start: 2020-03-26 | End: 2020-07-01

## 2020-04-03 DIAGNOSIS — F41.1 GENERALIZED ANXIETY DISORDER: ICD-10-CM

## 2020-04-03 DIAGNOSIS — F32.A DEPRESSIVE DISORDER: ICD-10-CM

## 2020-04-03 RX ORDER — SERTRALINE HYDROCHLORIDE 100 MG/1
100 TABLET, FILM COATED ORAL DAILY
Qty: 90 TABLET | Refills: 1 | Status: SHIPPED | OUTPATIENT
Start: 2020-04-03 | End: 2021-05-25 | Stop reason: SDUPTHER

## 2020-04-03 RX ORDER — LORAZEPAM 1 MG/1
1 TABLET ORAL 2 TIMES DAILY
Qty: 30 TABLET | Refills: 1 | Status: SHIPPED | OUTPATIENT
Start: 2020-04-03 | End: 2020-07-02 | Stop reason: SDUPTHER

## 2020-05-01 ENCOUNTER — APPOINTMENT (OUTPATIENT)
Dept: LAB | Facility: HOSPITAL | Age: 60
End: 2020-05-01
Payer: COMMERCIAL

## 2020-05-01 DIAGNOSIS — Z11.59 SCREENING FOR VIRAL DISEASE: ICD-10-CM

## 2020-05-01 DIAGNOSIS — E78.2 MIXED HYPERLIPIDEMIA: ICD-10-CM

## 2020-05-01 DIAGNOSIS — E11.59 TYPE 2 DIABETES MELLITUS WITH OTHER CIRCULATORY COMPLICATION, WITHOUT LONG-TERM CURRENT USE OF INSULIN (HCC): ICD-10-CM

## 2020-05-01 LAB
ALBUMIN SERPL BCP-MCNC: 3.9 G/DL (ref 3–5.2)
ALP SERPL-CCNC: 54 U/L (ref 43–122)
ALT SERPL W P-5'-P-CCNC: 44 U/L (ref 9–52)
ANION GAP SERPL CALCULATED.3IONS-SCNC: 6 MMOL/L (ref 5–14)
AST SERPL W P-5'-P-CCNC: 24 U/L (ref 17–59)
BILIRUB SERPL-MCNC: 0.4 MG/DL
BILIRUB UR QL STRIP: NEGATIVE
BUN SERPL-MCNC: 17 MG/DL (ref 5–25)
CALCIUM SERPL-MCNC: 9.4 MG/DL (ref 8.4–10.2)
CHLORIDE SERPL-SCNC: 106 MMOL/L (ref 97–108)
CHOLEST SERPL-MCNC: 131 MG/DL
CLARITY UR: CLEAR
CO2 SERPL-SCNC: 27 MMOL/L (ref 22–30)
COLOR UR: YELLOW
CREAT SERPL-MCNC: 0.43 MG/DL (ref 0.7–1.5)
EST. AVERAGE GLUCOSE BLD GHB EST-MCNC: 126 MG/DL
GFR SERPL CREATININE-BSD FRML MDRD: 128 ML/MIN/1.73SQ M
GLUCOSE P FAST SERPL-MCNC: 115 MG/DL (ref 70–99)
GLUCOSE UR STRIP-MCNC: NEGATIVE MG/DL
HBA1C MFR BLD: 6 %
HDLC SERPL-MCNC: 35 MG/DL
HGB UR QL STRIP.AUTO: NEGATIVE
KETONES UR STRIP-MCNC: NEGATIVE MG/DL
LDLC SERPL CALC-MCNC: 67 MG/DL
LEUKOCYTE ESTERASE UR QL STRIP: NEGATIVE
NITRITE UR QL STRIP: NEGATIVE
PH UR STRIP.AUTO: 6 [PH]
POTASSIUM SERPL-SCNC: 4.3 MMOL/L (ref 3.6–5)
PROT SERPL-MCNC: 6.6 G/DL (ref 5.9–8.4)
PROT UR STRIP-MCNC: NEGATIVE MG/DL
SODIUM SERPL-SCNC: 139 MMOL/L (ref 137–147)
SP GR UR STRIP.AUTO: 1.01 (ref 1–1.04)
TRIGL SERPL-MCNC: 145 MG/DL
UROBILINOGEN UA: NEGATIVE MG/DL

## 2020-05-01 PROCEDURE — 80061 LIPID PANEL: CPT

## 2020-05-01 PROCEDURE — 83036 HEMOGLOBIN GLYCOSYLATED A1C: CPT

## 2020-05-01 PROCEDURE — 87389 HIV-1 AG W/HIV-1&-2 AB AG IA: CPT

## 2020-05-01 PROCEDURE — 36415 COLL VENOUS BLD VENIPUNCTURE: CPT

## 2020-05-01 PROCEDURE — 3044F HG A1C LEVEL LT 7.0%: CPT | Performed by: FAMILY MEDICINE

## 2020-05-01 PROCEDURE — 80053 COMPREHEN METABOLIC PANEL: CPT

## 2020-05-04 ENCOUNTER — TELEPHONE (OUTPATIENT)
Dept: FAMILY MEDICINE CLINIC | Facility: CLINIC | Age: 60
End: 2020-05-04

## 2020-05-04 LAB — HIV 1+2 AB+HIV1 P24 AG SERPL QL IA: NORMAL

## 2020-05-29 DIAGNOSIS — I10 BENIGN ESSENTIAL HYPERTENSION: Primary | ICD-10-CM

## 2020-05-29 DIAGNOSIS — E78.2 MIXED HYPERLIPIDEMIA: ICD-10-CM

## 2020-05-29 RX ORDER — LOSARTAN POTASSIUM 50 MG/1
50 TABLET ORAL DAILY
Qty: 90 TABLET | Refills: 3 | Status: SHIPPED | OUTPATIENT
Start: 2020-05-29 | End: 2020-05-29

## 2020-05-29 RX ORDER — LOSARTAN POTASSIUM 50 MG/1
TABLET ORAL
Qty: 90 TABLET | Refills: 3 | Status: SHIPPED | OUTPATIENT
Start: 2020-05-29 | End: 2020-05-31 | Stop reason: RX

## 2020-05-31 RX ORDER — LOSARTAN POTASSIUM 50 MG/1
TABLET ORAL
Qty: 90 TABLET | Refills: 3 | OUTPATIENT
Start: 2020-05-31

## 2020-05-31 RX ORDER — LOSARTAN POTASSIUM 100 MG/1
50 TABLET ORAL DAILY
Qty: 50 TABLET | Refills: 3 | Status: SHIPPED | OUTPATIENT
Start: 2020-05-31 | End: 2020-06-02 | Stop reason: SDUPTHER

## 2020-06-02 ENCOUNTER — TELEPHONE (OUTPATIENT)
Dept: ADMINISTRATIVE | Facility: HOSPITAL | Age: 60
End: 2020-06-02

## 2020-06-02 ENCOUNTER — TELEMEDICINE (OUTPATIENT)
Dept: VASCULAR SURGERY | Facility: CLINIC | Age: 60
End: 2020-06-02
Payer: COMMERCIAL

## 2020-06-02 VITALS — BODY MASS INDEX: 26.83 KG/M2 | HEIGHT: 68 IN | WEIGHT: 177 LBS | TEMPERATURE: 98.5 F

## 2020-06-02 DIAGNOSIS — I10 BENIGN ESSENTIAL HYPERTENSION: ICD-10-CM

## 2020-06-02 DIAGNOSIS — I69.30 HISTORY OF CEREBROVASCULAR ACCIDENT WITH RESIDUAL DEFICIT: ICD-10-CM

## 2020-06-02 DIAGNOSIS — F17.299 OTHER TOBACCO PRODUCT NICOTINE DEPENDENCE WITH NICOTINE-INDUCED DISORDER: ICD-10-CM

## 2020-06-02 DIAGNOSIS — Q21.1 PATENT FORAMEN OVALE: ICD-10-CM

## 2020-06-02 DIAGNOSIS — I65.21 OCCLUSION OF CAROTID ARTERY WITHOUT CEREBRAL INFARCTION, RIGHT: ICD-10-CM

## 2020-06-02 DIAGNOSIS — E11.59 TYPE 2 DIABETES MELLITUS WITH OTHER CIRCULATORY COMPLICATION, WITHOUT LONG-TERM CURRENT USE OF INSULIN (HCC): ICD-10-CM

## 2020-06-02 DIAGNOSIS — E78.2 MIXED HYPERLIPIDEMIA: ICD-10-CM

## 2020-06-02 DIAGNOSIS — I65.23 CAROTID STENOSIS, BILATERAL: Primary | ICD-10-CM

## 2020-06-02 PROBLEM — F17.209 NICOTINE DEPENDENCE WITH NICOTINE-INDUCED DISORDER: Status: ACTIVE | Noted: 2018-07-24

## 2020-06-02 PROCEDURE — 3044F HG A1C LEVEL LT 7.0%: CPT | Performed by: PHYSICIAN ASSISTANT

## 2020-06-02 PROCEDURE — 99442 PR PHYS/QHP TELEPHONE EVALUATION 11-20 MIN: CPT | Performed by: PHYSICIAN ASSISTANT

## 2020-06-02 RX ORDER — LOSARTAN POTASSIUM 100 MG/1
50 TABLET ORAL DAILY
Qty: 90 TABLET | Refills: 3 | Status: SHIPPED | OUTPATIENT
Start: 2020-06-02 | End: 2020-07-16 | Stop reason: SDUPTHER

## 2020-06-12 DIAGNOSIS — G40.89 OTHER SEIZURES (HCC): ICD-10-CM

## 2020-06-12 RX ORDER — LEVETIRACETAM 1000 MG/1
1000 TABLET ORAL 2 TIMES DAILY
Qty: 60 TABLET | Refills: 5 | Status: SHIPPED | OUTPATIENT
Start: 2020-06-12 | End: 2020-10-28 | Stop reason: SDUPTHER

## 2020-07-02 DIAGNOSIS — F41.1 GENERALIZED ANXIETY DISORDER: ICD-10-CM

## 2020-07-02 RX ORDER — LORAZEPAM 1 MG/1
1 TABLET ORAL 2 TIMES DAILY
Qty: 30 TABLET | Refills: 0 | Status: SHIPPED | OUTPATIENT
Start: 2020-07-02 | End: 2020-08-13 | Stop reason: DRUGHIGH

## 2020-07-16 DIAGNOSIS — I10 BENIGN ESSENTIAL HYPERTENSION: ICD-10-CM

## 2020-07-16 DIAGNOSIS — R56.9 SEIZURE (HCC): ICD-10-CM

## 2020-07-16 RX ORDER — LOSARTAN POTASSIUM 100 MG/1
50 TABLET ORAL DAILY
Qty: 90 TABLET | Refills: 3 | Status: SHIPPED | OUTPATIENT
Start: 2020-07-16 | End: 2020-08-13 | Stop reason: SDUPTHER

## 2020-07-16 RX ORDER — GABAPENTIN 100 MG/1
100 CAPSULE ORAL 2 TIMES DAILY
Qty: 180 CAPSULE | Refills: 1 | Status: SHIPPED | OUTPATIENT
Start: 2020-07-16 | End: 2020-12-12

## 2020-08-13 ENCOUNTER — TELEPHONE (OUTPATIENT)
Dept: ADMINISTRATIVE | Facility: OTHER | Age: 60
End: 2020-08-13

## 2020-08-13 ENCOUNTER — OFFICE VISIT (OUTPATIENT)
Dept: FAMILY MEDICINE CLINIC | Facility: CLINIC | Age: 60
End: 2020-08-13
Payer: COMMERCIAL

## 2020-08-13 VITALS
WEIGHT: 192.7 LBS | TEMPERATURE: 97.6 F | HEIGHT: 68 IN | DIASTOLIC BLOOD PRESSURE: 80 MMHG | OXYGEN SATURATION: 97 % | BODY MASS INDEX: 29.21 KG/M2 | RESPIRATION RATE: 18 BRPM | SYSTOLIC BLOOD PRESSURE: 134 MMHG | HEART RATE: 84 BPM

## 2020-08-13 DIAGNOSIS — I10 BENIGN ESSENTIAL HYPERTENSION: ICD-10-CM

## 2020-08-13 DIAGNOSIS — N40.0 BENIGN PROSTATIC HYPERPLASIA WITHOUT LOWER URINARY TRACT SYMPTOMS: ICD-10-CM

## 2020-08-13 DIAGNOSIS — I65.23 CAROTID STENOSIS, BILATERAL: ICD-10-CM

## 2020-08-13 DIAGNOSIS — E78.49 OTHER HYPERLIPIDEMIA: ICD-10-CM

## 2020-08-13 DIAGNOSIS — F41.1 GENERALIZED ANXIETY DISORDER: ICD-10-CM

## 2020-08-13 DIAGNOSIS — F43.21 ADJUSTMENT DISORDER WITH DEPRESSED MOOD: ICD-10-CM

## 2020-08-13 DIAGNOSIS — E66.3 OVERWEIGHT: ICD-10-CM

## 2020-08-13 DIAGNOSIS — F17.299 OTHER TOBACCO PRODUCT NICOTINE DEPENDENCE WITH NICOTINE-INDUCED DISORDER: ICD-10-CM

## 2020-08-13 DIAGNOSIS — K63.5 HYPERPLASTIC COLONIC POLYP, UNSPECIFIED PART OF COLON: ICD-10-CM

## 2020-08-13 DIAGNOSIS — E78.2 MIXED HYPERLIPIDEMIA: ICD-10-CM

## 2020-08-13 DIAGNOSIS — F32.4 MAJOR DEPRESSIVE DISORDER WITH SINGLE EPISODE, IN PARTIAL REMISSION (HCC): ICD-10-CM

## 2020-08-13 DIAGNOSIS — E11.59 TYPE 2 DIABETES MELLITUS WITH OTHER CIRCULATORY COMPLICATION, WITHOUT LONG-TERM CURRENT USE OF INSULIN (HCC): Primary | ICD-10-CM

## 2020-08-13 DIAGNOSIS — G40.89 OTHER SEIZURES (HCC): ICD-10-CM

## 2020-08-13 DIAGNOSIS — I69.354 HEMIPARESIS AFFECTING LEFT SIDE AS LATE EFFECT OF CEREBROVASCULAR ACCIDENT (HCC): ICD-10-CM

## 2020-08-13 PROCEDURE — 3044F HG A1C LEVEL LT 7.0%: CPT | Performed by: FAMILY MEDICINE

## 2020-08-13 PROCEDURE — 3079F DIAST BP 80-89 MM HG: CPT | Performed by: FAMILY MEDICINE

## 2020-08-13 PROCEDURE — 99214 OFFICE O/P EST MOD 30 MIN: CPT | Performed by: FAMILY MEDICINE

## 2020-08-13 PROCEDURE — 4010F ACE/ARB THERAPY RXD/TAKEN: CPT | Performed by: FAMILY MEDICINE

## 2020-08-13 PROCEDURE — 3075F SYST BP GE 130 - 139MM HG: CPT | Performed by: FAMILY MEDICINE

## 2020-08-13 PROCEDURE — 3008F BODY MASS INDEX DOCD: CPT | Performed by: FAMILY MEDICINE

## 2020-08-13 RX ORDER — LOSARTAN POTASSIUM 100 MG/1
50 TABLET ORAL DAILY
Qty: 90 TABLET | Refills: 3 | Status: SHIPPED | OUTPATIENT
Start: 2020-08-13 | End: 2020-10-12 | Stop reason: DRUGHIGH

## 2020-08-13 RX ORDER — LORAZEPAM 0.5 MG/1
0.5 TABLET ORAL 2 TIMES DAILY
Qty: 30 TABLET | Refills: 1 | Status: SHIPPED | OUTPATIENT
Start: 2020-08-13 | End: 2020-09-23 | Stop reason: SDUPTHER

## 2020-08-13 RX ORDER — ATORVASTATIN CALCIUM 20 MG/1
20 TABLET, FILM COATED ORAL DAILY
Qty: 90 TABLET | Refills: 2 | Status: SHIPPED | OUTPATIENT
Start: 2020-08-13 | End: 2021-05-05

## 2020-08-13 NOTE — ASSESSMENT & PLAN NOTE
No seizures - followed by [facility]  He sees Dr Paula Waterman but he office was moved out to [address] and that is too far for him and Dr Paula Waterman is planning on [facility] in 01/2021

## 2020-08-13 NOTE — TELEPHONE ENCOUNTER
----- Message from Loida Jules MA sent at 8/13/2020 10:31 AM EDT -----  Regarding: Foot Exam- Harriet Mars  Patient seen about 9 weeks ago for his Foot Exam     Thank you

## 2020-08-13 NOTE — ASSESSMENT & PLAN NOTE
Has gained 5 pounds over the last 2 months  Because of the heat him because of issues around coronavirus he does not get the walk as much as he did prior  Ties to eat healthy  Patient is educated on the importance of portion control and dieting  Patient is also educated on the importance of exercise  Patient is encouraged to walk 30 min at least 5 times a week  Discussed about benefits of losing weight  Patient acknowledges that they understood what is discussed

## 2020-08-13 NOTE — ASSESSMENT & PLAN NOTE
Followed by [facility] - needs to make a change  Situation has been stable and he does quite well on his own  Walks without any appliance

## 2020-08-13 NOTE — ASSESSMENT & PLAN NOTE
Denies depressed moods and has been off the 25 milligrams of sertraline for past 1-2 months  He thinks he is okay on just to 100 milligram dosage

## 2020-08-13 NOTE — ASSESSMENT & PLAN NOTE
Has made contact with office of Dr Pamelia Dance but has not yet set up a definite appointment concerning his repeat colonoscopy

## 2020-08-13 NOTE — TELEPHONE ENCOUNTER
----- Message from Franck Mirza, [facility] sent at 8/13/2020 10:38 AM EDT -----  Regarding: Noam Boswell- Cortez MeléndezReading [phone]

## 2020-08-13 NOTE — ASSESSMENT & PLAN NOTE
8/13/20 - only needs the lorazepam on a p r n  Basis and only uses it about once every 2 weeks  He does not like to use it because he gets grogginess from the 1 milligram dosage  Will decrease to the 0 5 milligram dosage but only to be used on an as-needed basis

## 2020-08-13 NOTE — LETTER
Diabetic Eye Exam Form    Date Requested: 20  Patient: Aggie Guerra  Patient : 1960   Referring Provider: Grecia Boone MD    Dilated Retinal Exam, Optomap-Iris Exam, or Fundus Photography Done         Yes ([place] one above)         No     Date of Diabetic Eye Exam ______________________________  Left Eye      Exam did show retinopathy    Exam did not show retinopathy         Mild       Moderate       None       Proliferative       Severe     Right Eye     Exam did show retinopathy    Exam did not show retinopathy         Mild       Moderate       None       Proliferative       Severe     Comments __________________________________________________________    Practice Providing Exam ______________________________________________    Exam Performed By (print name) _______________________________________      Provider Signature ___________________________________________________      These reports are needed for  compliance    Please fax this completed form and a copy of the Diabetic Eye Exam report to our office located at [place] [address] as soon as possible to [phone] michelle Gonsales: Phone [phone]    We thank you for your assistance in treating our mutual patient

## 2020-08-13 NOTE — ASSESSMENT & PLAN NOTE
No HBPM   Blood pressure is controlled on present treatment regimen  Patient misses no doses and tolerates the meds without side effects  Patient avoids salt  Discussed diet, exercise and weight control  No change in present meds   Continue HBPM

## 2020-08-13 NOTE — ASSESSMENT & PLAN NOTE
Lab Results   Component Value Date    HGBA1C 6 0 (H) 05/01/2020   Only occasional HBSM  Blood sugars have been good  Remains on metformin a 1000 milligrams twice a day

## 2020-08-13 NOTE — PROGRESS NOTES
Assessment/Plan:    Overweight  Has gained 5 pounds over the last 2 months  Because of the heat him because of issues around coronavirus he does not get the walk as much as he did prior  Ties to eat healthy  Patient is educated on the importance of portion control and dieting  Patient is also educated on the importance of exercise  Patient is encouraged to walk 30 min at least 5 times a week  Discussed about benefits of losing weight  Patient acknowledges that they understood what is discussed  Benign essential hypertension  No HBPM   Blood pressure is controlled on present treatment regimen  Patient misses no doses and tolerates the meds without side effects  Patient avoids salt  Discussed diet, exercise and weight control  No change in present meds  Continue HBPM     Carotid stenosis, bilateral  10/2020 - appt with Dr Jessica Levine  Hyperplastic colonic polyp  Has made contact with office of Dr Jadon Weeks but has not yet set up a definite appointment concerning his repeat colonoscopy  Type 2 diabetes mellitus with circulatory disorder, without long-term current use of insulin ([facility])    Lab Results   Component Value Date    HGBA1C 6 0 (H) 05/01/2020   Only occasional HBSM  Blood sugars have been good  Remains on metformin a 1000 milligrams twice a day  Mixed hyperlipidemia  05/01/20 - HDL/LDL/TG = 35/67/145 - on atorvastatin 20mg + Zetia 10mg  No change in meds  Other seizures ([address] )  No seizures - followed by [facility]  He sees Dr Brynn Mcburney but he office was moved out to [address] and that is too far for him and Dr Brynn Mcburney is planning on shelter in 01/2021  Nicotine dependence with nicotine-induced disorder  Still smoking 5 small cigars per day  Not ready to quit  Adjustment disorder with depressed mood  Denies depressed moods and has been off the 25 milligrams of sertraline for past 1-2 months  He thinks he is okay on just to 100 milligram dosage      Generalized anxiety disorder  8/13/20 - only needs the lorazepam on a p r n  Basis and only uses it about once every 2 weeks  He does not like to use it because he gets grogginess from the 1 milligram dosage  Will decrease to the 0 5 milligram dosage but only to be used on an as-needed basis  Hemiparesis affecting left side as late effect of cerebrovascular accident ([facility] [address] )  Followed by [facility] - needs to make a change  Situation has been stable and he does quite well on his own  Walks without any appliance  Diagnoses and all orders for this visit:    Type 2 diabetes mellitus with other circulatory complication, without long-term current use of insulin ([facility])  -     Hemoglobin A1C; Future  -     Microalbumin / creatinine urine ratio    Overweight    Benign essential hypertension  -     Comprehensive metabolic panel; Future    Carotid stenosis, bilateral    Hyperplastic colonic polyp, unspecified part of colon    Mixed hyperlipidemia  -     LDL cholesterol, direct; Future    Other seizures ([facility] [address] )  -     Ambulatory referral to Neurology; Future    Other tobacco product nicotine dependence with nicotine-induced disorder    Adjustment disorder with depressed mood    Major depressive disorder with single episode, in partial remission ([facility])    Generalized anxiety disorder  -     LORazepam (ATIVAN) 0 5 mg tablet; Take 1 tablet (0 5 mg total) by mouth 2 (two) times a day As needed for anxiety    Hemiparesis affecting left side as late effect of cerebrovascular accident [facility])  -     Ambulatory referral to Neurology; Future    Benign prostatic hyperplasia without lower urinary tract symptoms  -     PSA Total, Diagnostic; Future          Subjective:     Chief Complaint   Patient presents with    Follow-up     2/13/20 visit: labs done 5/1/20    Fatigue     continues        Patient ID: Tita Rain is a 64 y o  male      HPI : Multiple Chronic Medical Conditions    The following portions of the patient's history were reviewed and updated as appropriate: allergies, current medications, past family history, past medical history, past social history, past surgical history and problem list     Review of Systems   Constitutional: Negative for activity change, appetite change, fatigue, fever and unexpected weight change  HENT: Negative for congestion, dental problem and sneezing  Eyes: Negative for discharge and visual disturbance  Last eye check was OK - 9/2019  Respiratory: Negative for cough, shortness of breath and wheezing  Cardiovascular: Negative for chest pain, palpitations and leg swelling  Gastrointestinal: Negative for abdominal pain, constipation, diarrhea, nausea and vomiting  Endocrine: Negative for polydipsia and polyuria  Genitourinary: Negative for dysuria and frequency  Musculoskeletal: Negative for arthralgias  Skin: Negative for rash  Allergic/Immunologic: Negative for environmental allergies and food allergies  Neurological: Positive for weakness  Negative for dizziness, seizures and headaches  Hematological: Negative for adenopathy  Psychiatric/Behavioral: Negative for behavioral problems and sleep disturbance  Objective:  Vitals:    08/13/20 1023   BP: 134/80   BP Location: Left arm   Patient Position: Sitting   Cuff Size: Standard   Pulse: 84   Resp: 18   Temp: 97 6 °F (36 4 °C)   TempSrc: Temporal   SpO2: 97%   Weight: 87 4 kg (192 lb 11 2 oz)   Height: 5' 8" (1 727 m)      Physical Exam  Constitutional:       Appearance: Normal appearance  He is well-developed  HENT:      Head: Normocephalic  Eyes:      General:         Right eye: No discharge  Left eye: No discharge  Conjunctiva/sclera: Conjunctivae normal    Neck:      Musculoskeletal: Normal range of motion and neck supple  Thyroid: No thyromegaly  Cardiovascular:      Rate and Rhythm: Normal rate and regular rhythm  Heart sounds: Normal heart sounds  No murmur  Comments: APR = 72    Pulmonary: Effort: Pulmonary effort is normal       Breath sounds: Normal breath sounds  Abdominal:      General: Abdomen is flat  Bowel sounds are normal       Palpations: Abdomen is soft  There is no hepatomegaly, splenomegaly or mass  Tenderness: There is no abdominal tenderness  Hernia: A hernia is present  Hernia is present in the umbilical area  Musculoskeletal: Normal range of motion  General: No swelling or tenderness  Lymphadenopathy:      Cervical: No cervical adenopathy  Skin:     General: Skin is warm  Findings: No rash  Neurological:      Mental Status: He is alert and oriented to person, place, and time  Motor: Weakness present  Comments: Left hemiparesis : LUE > LLE  Psychiatric:         Mood and Affect: Mood normal          Behavior: Behavior normal          Thought Content: Thought content normal          Judgment: Judgment normal          Patient Instructions   Recommendations for weight loss:   Eat smaller portions at meals and drink water with meals  Avoid bread and bread products, like bagels and muffins  Avoid fast foods  Eat HEALTHY snacks  Avoid drinking soda or sweet drinks  Eat 3 to 5 servings of fruit and vegetables per day  Eat more fish and only lean meat  Heart Healthy Diet   WHAT YOU NEED TO KNOW:   A heart healthy diet is an eating plan low in total fat, unhealthy fats, and sodium (salt)  A heart healthy diet helps decrease your risk for heart disease and stroke  Limit the amount of fat you eat to 25% to 35% of your total daily calories  Limit sodium to less than 2,300 mg each day  DISCHARGE INSTRUCTIONS:   Healthy fats:  Healthy fats can help improve cholesterol levels  The risk for heart disease is decreased when cholesterol levels are normal  Choose healthy fats, such as the following:  · Unsaturated fat  is found in foods such as soybean, canola, olive, corn, and safflower oils   It is also found in soft tub margarine that is made with liquid vegetable oil  · Omega-3 fat  is found in certain fish, such as salmon, tuna, and trout, and in walnuts and flaxseed  Unhealthy fats:  Unhealthy fats can cause unhealthy cholesterol levels in your blood and increase your risk of heart disease  Limit unhealthy fats, such as the following:  · Cholesterol  is found in animal foods, such as eggs and lobster, and in dairy products made from whole milk  Limit cholesterol to less than 300 milligrams (mg) each day  You may need to limit cholesterol to 200 mg each day if you have heart disease  · Saturated fat  is found in meats, such as dorantes and hamburger  It is also found in chicken or turkey skin, whole milk, and butter  Limit saturated fat to less than 7% of your total daily calories  Limit saturated fat to less than 6% if you have heart disease or are at increased risk for it  · Trans fat  is found in packaged foods, such as potato chips and cookies  It is also in hard margarine, some fried foods, and shortening  Avoid trans fats as much as possible    Heart healthy foods and drinks to include:  Ask your dietitian or healthcare provider how many servings to have from each of the following food groups:  · Grains:      ¨ Whole-wheat breads, cereals, and pastas, and brown rice    ¨ Low-fat, low-sodium crackers and chips    · Vegetables:      ¨ Broccoli, green beans, green peas, and spinach    ¨ Collards, kale, and lima beans    ¨ Carrots, sweet potatoes, tomatoes, and peppers    ¨ Canned vegetables with no salt added    · Fruits:      ¨ Bananas, peaches, pears, and pineapple    ¨ Grapes, raisins, and dates    ¨ Oranges, tangerines, grapefruit, orange juice, and grapefruit juice    ¨ Apricots, mangoes, melons, and papaya    ¨ Raspberries and strawberries    ¨ Canned fruit with no added sugar    · Low-fat dairy products:      ¨ Nonfat (skim) milk, 1% milk, and low-fat almond, cashew, or soy milks fortified with calcium    ¨ Low-fat cheese, regular or frozen yogurt, and cottage cheese    · Meats and proteins , such as lean cuts of beef and pork (loin, leg, round), skinless chicken and turkey, legumes, soy products, egg whites, and nuts  Foods and drinks to limit or avoid:  Ask your dietitian or healthcare provider about these and other foods that are high in unhealthy fat, sodium, and sugar:  · Snack or packaged foods , such as frozen dinners, cookies, macaroni and cheese, and cereals with more than 300 mg of sodium per serving    · Canned or dry mixes  for cakes, soups, sauces, or gravies    · Vegetables with added sodium , such as instant potatoes, vegetables with added sauces, or regular canned vegetables    · Other foods high in sodium , such as ketchup, barbecue sauce, salad dressing, pickles, olives, soy sauce, and miso    · High-fat dairy foods  such as whole or 2% milk, cream cheese, or sour cream, and cheeses     · High-fat protein foods  such as high-fat cuts of beef (T-bone steaks, ribs), chicken or turkey with skin, and organ meats, such as liver    · Cured or smoked meats , such as hot dogs, dorantes, and sausage    · Unhealthy fats and oils , such as butter, stick margarine, shortening, and cooking oils such as coconut or palm oil    · Food and drinks high in sugar , such as soft drinks (soda), sports drinks, sweetened tea, candy, cake, cookies, pies, and doughnuts  Other diet guidelines to follow:   · Eat more foods containing omega-3 fats  Eat fish high in omega-3 fats at least 2 times a week  · Limit alcohol  Too much alcohol can damage your heart and raise your blood pressure  Women should limit alcohol to 1 drink a day  Men should limit alcohol to 2 drinks a day  A drink of alcohol is 12 ounces of beer, 5 ounces of wine, or 1½ ounces of liquor  · Choose low-sodium foods  High-sodium foods can lead to high blood pressure  Add little or no salt to food you prepare  Use herbs and spices in place of salt      · Eat more fiber  to help lower cholesterol levels  Eat at least 5 servings of fruits and vegetables each day  Eat 3 ounces of whole-grain foods each day  Legumes (beans) are also a good source of fiber  Lifestyle guidelines:   · Do not smoke  Nicotine and other chemicals in cigarettes and cigars can cause lung and heart damage  Ask your healthcare provider for information if you currently smoke and need help to quit  E-cigarettes or smokeless tobacco still contain nicotine  Talk to your healthcare provider before you use these products  · Exercise regularly  to help you maintain a healthy weight and improve your blood pressure and cholesterol levels  Ask your healthcare provider about the best exercise plan for you  Do not start an exercise program without asking your healthcare provider  Follow up with your healthcare provider as directed:  Write down your questions so you remember to ask them during your visits  © 2017 [address] Information is for End User's use only and may not be sold, redistributed or otherwise used for commercial purposes  All illustrations and images included in CareNotes® are the copyrighted property of A D A M [employer] Inc  or [employer]ull[employer]  The above information is an  only  It is not intended as medical advice for individual conditions or treatments  Talk to your doctor, nurse or pharmacist before following any medical regimen to see if it is safe and effective for you

## 2020-08-13 NOTE — LETTER
Diabetic Foot Exam Form    Date Requested: 20  Patient: Declan Cunningham  Patient : 1960   Referring Provider: Britney Khan MD    Diabetic Foot Exam Performed with shoes and socks removed        Yes         No     Date of Diabetic Foot Exam ______________________________  Risk Score ____________________________________________    Left Foot       Visual Inspection         Monofilament Testing Sensory Exam        Pedal Pulses         Additional Comments         Right Foot      Visual Inspection         Monofilament Testing Sensory Exam       Pedal Pulses         Additional Comments         Comments __________________________________________________________    Practice Providing Exam ______________________________________________    Exam Performed By (print name) _______________________________________      Provider Signature ___________________________________________________      These reports are needed for  compliance    Please fax this completed form and a copy of the Diabetic Foot Exam report to our office located at [place] [address] as soon as possible to [phone] michelle Madrigal: Phone [phone]    We thank you for your assistance in treating our mutual patient

## 2020-08-13 NOTE — PATIENT INSTRUCTIONS
Recommendations for weight loss:   Eat smaller portions at meals and drink water with meals  Avoid bread and bread products, like bagels and muffins  Avoid fast foods  Eat HEALTHY snacks  Avoid drinking soda or sweet drinks  Eat 3 to 5 servings of fruit and vegetables per day  Eat more fish and only lean meat  Heart Healthy Diet   WHAT YOU NEED TO KNOW:   A heart healthy diet is an eating plan low in total fat, unhealthy fats, and sodium (salt)  A heart healthy diet helps decrease your risk for heart disease and stroke  Limit the amount of fat you eat to 25% to 35% of your total daily calories  Limit sodium to less than 2,300 mg each day  DISCHARGE INSTRUCTIONS:   Healthy fats:  Healthy fats can help improve cholesterol levels  The risk for heart disease is decreased when cholesterol levels are normal  Choose healthy fats, such as the following:  · Unsaturated fat  is found in foods such as soybean, canola, olive, corn, and safflower oils  It is also found in soft tub margarine that is made with liquid vegetable oil  · Omega-3 fat  is found in certain fish, such as salmon, tuna, and trout, and in walnuts and flaxseed  Unhealthy fats:  Unhealthy fats can cause unhealthy cholesterol levels in your blood and increase your risk of heart disease  Limit unhealthy fats, such as the following:  · Cholesterol  is found in animal foods, such as eggs and lobster, and in dairy products made from whole milk  Limit cholesterol to less than 300 milligrams (mg) each day  You may need to limit cholesterol to 200 mg each day if you have heart disease  · Saturated fat  is found in meats, such as dorantes and hamburger  It is also found in chicken or turkey skin, whole milk, and butter  Limit saturated fat to less than 7% of your total daily calories  Limit saturated fat to less than 6% if you have heart disease or are at increased risk for it       · Trans fat  is found in packaged foods, such as potato chips and cookies  It is also in hard margarine, some fried foods, and shortening  Avoid trans fats as much as possible    Heart healthy foods and drinks to include:  Ask your dietitian or healthcare provider how many servings to have from each of the following food groups:  · Grains:      ¨ Whole-wheat breads, cereals, and pastas, and brown rice    ¨ Low-fat, low-sodium crackers and chips    · Vegetables:      ¨ Broccoli, green beans, green peas, and spinach    ¨ Collards, kale, and lima beans    ¨ Carrots, sweet potatoes, tomatoes, and peppers    ¨ Canned vegetables with no salt added    · Fruits:      ¨ Bananas, peaches, pears, and pineapple    ¨ Grapes, raisins, and dates    ¨ Oranges, tangerines, grapefruit, orange juice, and grapefruit juice    ¨ Apricots, mangoes, melons, and papaya    ¨ Raspberries and strawberries    ¨ Canned fruit with no added sugar    · Low-fat dairy products:      ¨ Nonfat (skim) milk, 1% milk, and low-fat almond, cashew, or soy milks fortified with calcium    ¨ Low-fat cheese, regular or frozen yogurt, and cottage cheese    · Meats and proteins , such as lean cuts of beef and pork (loin, leg, round), skinless chicken and turkey, legumes, soy products, egg whites, and nuts  Foods and drinks to limit or avoid:  Ask your dietitian or healthcare provider about these and other foods that are high in unhealthy fat, sodium, and sugar:  · Snack or packaged foods , such as frozen dinners, cookies, macaroni and cheese, and cereals with more than 300 mg of sodium per serving    · Canned or dry mixes  for cakes, soups, sauces, or gravies    · Vegetables with added sodium , such as instant potatoes, vegetables with added sauces, or regular canned vegetables    · Other foods high in sodium , such as ketchup, barbecue sauce, salad dressing, pickles, olives, soy sauce, and miso    · High-fat dairy foods  such as whole or 2% milk, cream cheese, or sour cream, and cheeses     · High-fat protein foods  such as high-fat cuts of beef (T-bone steaks, ribs), chicken or turkey with skin, and organ meats, such as liver    · Cured or smoked meats , such as hot dogs, dorantes, and sausage    · Unhealthy fats and oils , such as butter, stick margarine, shortening, and cooking oils such as coconut or palm oil    · Food and drinks high in sugar , such as soft drinks (soda), sports drinks, sweetened tea, candy, cake, cookies, pies, and doughnuts  Other diet guidelines to follow:   · Eat more foods containing omega-3 fats  Eat fish high in omega-3 fats at least 2 times a week  · Limit alcohol  Too much alcohol can damage your heart and raise your blood pressure  Women should limit alcohol to 1 drink a day  Men should limit alcohol to 2 drinks a day  A drink of alcohol is 12 ounces of beer, 5 ounces of wine, or 1½ ounces of liquor  · Choose low-sodium foods  High-sodium foods can lead to high blood pressure  Add little or no salt to food you prepare  Use herbs and spices in place of salt  · Eat more fiber  to help lower cholesterol levels  Eat at least 5 servings of fruits and vegetables each day  Eat 3 ounces of whole-grain foods each day  Legumes (beans) are also a good source of fiber  Lifestyle guidelines:   · Do not smoke  Nicotine and other chemicals in cigarettes and cigars can cause lung and heart damage  Ask your healthcare provider for information if you currently smoke and need help to quit  E-cigarettes or smokeless tobacco still contain nicotine  Talk to your healthcare provider before you use these products  · Exercise regularly  to help you maintain a healthy weight and improve your blood pressure and cholesterol levels  Ask your healthcare provider about the best exercise plan for you  Do not start an exercise program without asking your healthcare provider  Follow up with your healthcare provider as directed:  Write down your questions so you remember to ask them during your visits     © 2017 [facility] [address] is for End User's use only and may not be sold, redistributed or otherwise used for commercial purposes  All illustrations and images included in CareNotes® are the copyrighted property of A [employer] M , Inc  or [employer]manuel [employer]ullon  The above information is an  only  It is not intended as medical advice for individual conditions or treatments  Talk to your doctor, nurse or pharmacist before following any medical regimen to see if it is safe and effective for you

## 2020-08-13 NOTE — TELEPHONE ENCOUNTER
Upon review of the In Basket request and the patient's chart, initial outreach has been made via fax, please see Contacts section for details  A second outreach attempt will be made within 5 business days      Thank you  Uyen Washington MA

## 2020-08-17 ENCOUNTER — TELEPHONE (OUTPATIENT)
Dept: NEUROLOGY | Facility: CLINIC | Age: 60
End: 2020-08-17

## 2020-08-17 NOTE — TELEPHONE ENCOUNTER
MARIA EUGENIA/HALEY pt stated  to far of a travel to the [facility] office and does not want to go the [facility]

## 2020-08-17 NOTE — TELEPHONE ENCOUNTER
Phoned both [facility] and [facility], they will be faxing over most recent exams  Fax # give: [phone]    Prtaima Sims

## 2020-08-17 NOTE — TELEPHONE ENCOUNTER
Upon review of your request/inquiry, we have found as a result of outreach that patient did not have the requested item(s) completed  Any additional questions or concerns should be emailed to the Practice Liaisons via [email or web address] email, please do not reply via In Basket       Thank you  Flores Granger MA

## 2020-08-24 DIAGNOSIS — E11.9 DIABETES MELLITUS WITHOUT COMPLICATION (HCC): ICD-10-CM

## 2020-09-17 DIAGNOSIS — E78.2 MIXED HYPERLIPIDEMIA: ICD-10-CM

## 2020-09-17 RX ORDER — EZETIMIBE 10 MG/1
10 TABLET ORAL DAILY
Qty: 90 TABLET | Refills: 3 | Status: SHIPPED | OUTPATIENT
Start: 2020-09-17 | End: 2021-05-24 | Stop reason: SDUPTHER

## 2020-09-23 DIAGNOSIS — F41.1 GENERALIZED ANXIETY DISORDER: ICD-10-CM

## 2020-09-23 RX ORDER — LORAZEPAM 0.5 MG/1
0.5 TABLET ORAL 2 TIMES DAILY
Qty: 30 TABLET | Refills: 1 | Status: SHIPPED | OUTPATIENT
Start: 2020-09-23 | End: 2020-11-06 | Stop reason: SDUPTHER

## 2020-10-12 ENCOUNTER — HOSPITAL ENCOUNTER (OUTPATIENT)
Dept: NON INVASIVE DIAGNOSTICS | Facility: CLINIC | Age: 60
Discharge: HOME | End: 2020-10-12
Payer: COMMERCIAL

## 2020-10-12 DIAGNOSIS — I65.23 CAROTID STENOSIS, BILATERAL: ICD-10-CM

## 2020-10-12 DIAGNOSIS — I10 BENIGN ESSENTIAL HYPERTENSION: Primary | ICD-10-CM

## 2020-10-12 PROCEDURE — 93880 EXTRACRANIAL BILAT STUDY: CPT

## 2020-10-12 PROCEDURE — 4010F ACE/ARB THERAPY RXD/TAKEN: CPT | Performed by: SURGERY

## 2020-10-12 RX ORDER — LOSARTAN POTASSIUM 50 MG/1
50 TABLET ORAL DAILY
Qty: 90 TABLET | Refills: 3 | Status: SHIPPED | OUTPATIENT
Start: 2020-10-12 | End: 2021-09-28 | Stop reason: SDUPTHER

## 2020-10-13 PROCEDURE — 93880 EXTRACRANIAL BILAT STUDY: CPT | Performed by: SURGERY

## 2020-10-19 ENCOUNTER — OFFICE VISIT (OUTPATIENT)
Dept: VASCULAR SURGERY | Facility: CLINIC | Age: 60
End: 2020-10-19
Payer: COMMERCIAL

## 2020-10-19 VITALS
BODY MASS INDEX: 28.64 KG/M2 | TEMPERATURE: 99.3 F | DIASTOLIC BLOOD PRESSURE: 80 MMHG | SYSTOLIC BLOOD PRESSURE: 138 MMHG | WEIGHT: 189 LBS | HEART RATE: 80 BPM | RESPIRATION RATE: 18 BRPM | HEIGHT: 68 IN

## 2020-10-19 DIAGNOSIS — I65.23 CAROTID STENOSIS, BILATERAL: Primary | ICD-10-CM

## 2020-10-19 PROCEDURE — 3079F DIAST BP 80-89 MM HG: CPT | Performed by: SURGERY

## 2020-10-19 PROCEDURE — 1036F TOBACCO NON-USER: CPT | Performed by: SURGERY

## 2020-10-19 PROCEDURE — 99214 OFFICE O/P EST MOD 30 MIN: CPT | Performed by: SURGERY

## 2020-10-28 ENCOUNTER — OFFICE VISIT (OUTPATIENT)
Dept: NEUROLOGY | Facility: CLINIC | Age: 60
End: 2020-10-28
Payer: COMMERCIAL

## 2020-10-28 VITALS
TEMPERATURE: 96.7 F | WEIGHT: 191.6 LBS | DIASTOLIC BLOOD PRESSURE: 72 MMHG | SYSTOLIC BLOOD PRESSURE: 125 MMHG | HEART RATE: 80 BPM | HEIGHT: 68 IN | BODY MASS INDEX: 29.04 KG/M2

## 2020-10-28 DIAGNOSIS — I69.398 EPILEPSY DUE TO OLD STROKE (HCC): ICD-10-CM

## 2020-10-28 DIAGNOSIS — I69.354 HEMIPARESIS AFFECTING LEFT SIDE AS LATE EFFECT OF CEREBROVASCULAR ACCIDENT (HCC): ICD-10-CM

## 2020-10-28 DIAGNOSIS — G40.909 EPILEPSY DUE TO OLD STROKE (HCC): ICD-10-CM

## 2020-10-28 PROCEDURE — 3074F SYST BP LT 130 MM HG: CPT | Performed by: PSYCHIATRY & NEUROLOGY

## 2020-10-28 PROCEDURE — 99205 OFFICE O/P NEW HI 60 MIN: CPT | Performed by: PSYCHIATRY & NEUROLOGY

## 2020-10-28 PROCEDURE — 3008F BODY MASS INDEX DOCD: CPT | Performed by: PSYCHIATRY & NEUROLOGY

## 2020-10-28 PROCEDURE — 3078F DIAST BP <80 MM HG: CPT | Performed by: PSYCHIATRY & NEUROLOGY

## 2020-10-28 RX ORDER — LEVETIRACETAM 1000 MG/1
1000 TABLET ORAL 2 TIMES DAILY
Qty: 180 TABLET | Refills: 3 | Status: SHIPPED | OUTPATIENT
Start: 2020-10-28 | End: 2021-04-26 | Stop reason: SDUPTHER

## 2020-10-29 PROBLEM — I69.398 EPILEPSY DUE TO OLD STROKE (HCC): Status: ACTIVE | Noted: 2020-10-29

## 2020-10-29 PROBLEM — G40.909 EPILEPSY DUE TO OLD STROKE (HCC): Status: ACTIVE | Noted: 2020-10-29

## 2020-11-04 ENCOUNTER — HOSPITAL ENCOUNTER (OUTPATIENT)
Dept: NEUROLOGY | Facility: CLINIC | Age: 60
Discharge: HOME | End: 2020-11-04
Payer: COMMERCIAL

## 2020-11-04 DIAGNOSIS — G40.909 EPILEPSY DUE TO OLD STROKE (HCC): ICD-10-CM

## 2020-11-04 DIAGNOSIS — I69.398 EPILEPSY DUE TO OLD STROKE (HCC): ICD-10-CM

## 2020-11-04 PROCEDURE — 95819 EEG AWAKE AND ASLEEP: CPT

## 2020-11-05 PROCEDURE — 95816 EEG AWAKE AND DROWSY: CPT | Performed by: STUDENT IN AN ORGANIZED HEALTH CARE EDUCATION/TRAINING PROGRAM

## 2020-11-06 DIAGNOSIS — F41.1 GENERALIZED ANXIETY DISORDER: ICD-10-CM

## 2020-11-06 RX ORDER — LORAZEPAM 0.5 MG/1
0.5 TABLET ORAL 2 TIMES DAILY
Qty: 30 TABLET | Refills: 1 | Status: SHIPPED | OUTPATIENT
Start: 2020-11-06 | End: 2020-12-11 | Stop reason: SDUPTHER

## 2020-11-18 ENCOUNTER — TELEPHONE (OUTPATIENT)
Dept: NEUROLOGY | Facility: CLINIC | Age: 60
End: 2020-11-18

## 2020-12-02 ENCOUNTER — DOCUMENTATION (OUTPATIENT)
Dept: FAMILY MEDICINE CLINIC | Facility: CLINIC | Age: 60
End: 2020-12-02

## 2020-12-02 ENCOUNTER — TELEPHONE (OUTPATIENT)
Dept: FAMILY MEDICINE CLINIC | Facility: CLINIC | Age: 60
End: 2020-12-02

## 2020-12-11 DIAGNOSIS — R56.9 SEIZURE (HCC): ICD-10-CM

## 2020-12-11 DIAGNOSIS — F41.1 GENERALIZED ANXIETY DISORDER: ICD-10-CM

## 2020-12-11 RX ORDER — LORAZEPAM 0.5 MG/1
0.5 TABLET ORAL 2 TIMES DAILY
Qty: 30 TABLET | Refills: 1 | Status: SHIPPED | OUTPATIENT
Start: 2020-12-11 | End: 2021-01-15 | Stop reason: SDUPTHER

## 2020-12-12 RX ORDER — GABAPENTIN 100 MG/1
CAPSULE ORAL
Qty: 180 CAPSULE | Refills: 1 | Status: SHIPPED | OUTPATIENT
Start: 2020-12-12 | End: 2020-12-18 | Stop reason: SDUPTHER

## 2020-12-14 ENCOUNTER — TELEPHONE (OUTPATIENT)
Dept: NEUROLOGY | Facility: CLINIC | Age: 60
End: 2020-12-14

## 2020-12-18 DIAGNOSIS — R56.9 SEIZURE (HCC): ICD-10-CM

## 2020-12-18 RX ORDER — GABAPENTIN 100 MG/1
100 CAPSULE ORAL 2 TIMES DAILY
Qty: 180 CAPSULE | Refills: 1 | Status: SHIPPED | OUTPATIENT
Start: 2020-12-18 | End: 2021-02-17 | Stop reason: ALTCHOICE

## 2021-01-15 DIAGNOSIS — F41.1 GENERALIZED ANXIETY DISORDER: ICD-10-CM

## 2021-01-15 RX ORDER — LORAZEPAM 0.5 MG/1
0.5 TABLET ORAL 2 TIMES DAILY
Qty: 30 TABLET | Refills: 0 | Status: SHIPPED | OUTPATIENT
Start: 2021-01-15 | End: 2021-02-17 | Stop reason: SDUPTHER

## 2021-02-10 ENCOUNTER — LAB (OUTPATIENT)
Dept: LAB | Facility: HOSPITAL | Age: 61
End: 2021-02-10
Payer: COMMERCIAL

## 2021-02-10 DIAGNOSIS — E11.59 TYPE 2 DIABETES MELLITUS WITH OTHER CIRCULATORY COMPLICATION, WITHOUT LONG-TERM CURRENT USE OF INSULIN (HCC): ICD-10-CM

## 2021-02-10 DIAGNOSIS — E78.2 MIXED HYPERLIPIDEMIA: ICD-10-CM

## 2021-02-10 DIAGNOSIS — I10 BENIGN ESSENTIAL HYPERTENSION: ICD-10-CM

## 2021-02-10 DIAGNOSIS — N40.0 BENIGN PROSTATIC HYPERPLASIA WITHOUT LOWER URINARY TRACT SYMPTOMS: ICD-10-CM

## 2021-02-10 LAB
ALBUMIN SERPL BCP-MCNC: 3.7 G/DL (ref 3.5–5)
ALP SERPL-CCNC: 72 U/L (ref 46–116)
ALT SERPL W P-5'-P-CCNC: 34 U/L (ref 12–78)
ANION GAP SERPL CALCULATED.3IONS-SCNC: 6 MMOL/L (ref 4–13)
AST SERPL W P-5'-P-CCNC: 16 U/L (ref 5–45)
BILIRUB SERPL-MCNC: 0.39 MG/DL (ref 0.2–1)
BUN SERPL-MCNC: 12 MG/DL (ref 5–25)
CALCIUM SERPL-MCNC: 9.5 MG/DL (ref 8.3–10.1)
CHLORIDE SERPL-SCNC: 103 MMOL/L (ref 100–108)
CO2 SERPL-SCNC: 30 MMOL/L (ref 21–32)
CREAT SERPL-MCNC: 0.55 MG/DL (ref 0.6–1.3)
CREAT UR-MCNC: 76.2 MG/DL
EST. AVERAGE GLUCOSE BLD GHB EST-MCNC: 123 MG/DL
GFR SERPL CREATININE-BSD FRML MDRD: 115 ML/MIN/1.73SQ M
GLUCOSE P FAST SERPL-MCNC: 143 MG/DL (ref 65–99)
HBA1C MFR BLD: 5.9 %
LDLC SERPL DIRECT ASSAY-MCNC: 65 MG/DL (ref 0–100)
MICROALBUMIN UR-MCNC: 6.2 MG/L (ref 0–20)
MICROALBUMIN/CREAT 24H UR: 8 MG/G CREATININE (ref 0–30)
POTASSIUM SERPL-SCNC: 4.1 MMOL/L (ref 3.5–5.3)
PROT SERPL-MCNC: 7.1 G/DL (ref 6.4–8.2)
PSA SERPL-MCNC: 1.5 NG/ML (ref 0–4)
SODIUM SERPL-SCNC: 139 MMOL/L (ref 136–145)

## 2021-02-10 PROCEDURE — 3044F HG A1C LEVEL LT 7.0%: CPT | Performed by: FAMILY MEDICINE

## 2021-02-10 PROCEDURE — 82043 UR ALBUMIN QUANTITATIVE: CPT | Performed by: FAMILY MEDICINE

## 2021-02-10 PROCEDURE — 83721 ASSAY OF BLOOD LIPOPROTEIN: CPT

## 2021-02-10 PROCEDURE — 80053 COMPREHEN METABOLIC PANEL: CPT

## 2021-02-10 PROCEDURE — 36415 COLL VENOUS BLD VENIPUNCTURE: CPT

## 2021-02-10 PROCEDURE — 3061F NEG MICROALBUMINURIA REV: CPT | Performed by: FAMILY MEDICINE

## 2021-02-10 PROCEDURE — 83036 HEMOGLOBIN GLYCOSYLATED A1C: CPT

## 2021-02-10 PROCEDURE — 82570 ASSAY OF URINE CREATININE: CPT | Performed by: FAMILY MEDICINE

## 2021-02-10 PROCEDURE — 84153 ASSAY OF PSA TOTAL: CPT

## 2021-02-15 RX ORDER — LOSARTAN POTASSIUM 100 MG/1
50 TABLET ORAL DAILY
COMMUNITY
Start: 2020-12-21 | End: 2021-07-29

## 2021-02-15 RX ORDER — SERTRALINE HYDROCHLORIDE 25 MG/1
25 TABLET, FILM COATED ORAL DAILY
COMMUNITY
Start: 2020-12-20 | End: 2021-02-17 | Stop reason: DRUGHIGH

## 2021-02-17 ENCOUNTER — OFFICE VISIT (OUTPATIENT)
Dept: FAMILY MEDICINE CLINIC | Facility: CLINIC | Age: 61
End: 2021-02-17
Payer: COMMERCIAL

## 2021-02-17 VITALS
WEIGHT: 187.4 LBS | OXYGEN SATURATION: 96 % | DIASTOLIC BLOOD PRESSURE: 72 MMHG | SYSTOLIC BLOOD PRESSURE: 122 MMHG | HEART RATE: 88 BPM | TEMPERATURE: 97.5 F | HEIGHT: 68 IN | RESPIRATION RATE: 18 BRPM | BODY MASS INDEX: 28.4 KG/M2

## 2021-02-17 DIAGNOSIS — F41.1 GENERALIZED ANXIETY DISORDER: ICD-10-CM

## 2021-02-17 DIAGNOSIS — Z00.00 MEDICARE ANNUAL WELLNESS VISIT, SUBSEQUENT: Primary | ICD-10-CM

## 2021-02-17 DIAGNOSIS — Z23 NEED FOR VACCINATION: ICD-10-CM

## 2021-02-17 DIAGNOSIS — I10 BENIGN ESSENTIAL HYPERTENSION: ICD-10-CM

## 2021-02-17 DIAGNOSIS — I69.354 HEMIPARESIS AFFECTING LEFT SIDE AS LATE EFFECT OF CEREBROVASCULAR ACCIDENT (HCC): ICD-10-CM

## 2021-02-17 DIAGNOSIS — K63.5 HYPERPLASTIC COLONIC POLYP, UNSPECIFIED PART OF COLON: ICD-10-CM

## 2021-02-17 DIAGNOSIS — F32.4 MAJOR DEPRESSIVE DISORDER WITH SINGLE EPISODE, IN PARTIAL REMISSION (HCC): ICD-10-CM

## 2021-02-17 DIAGNOSIS — E78.2 MIXED HYPERLIPIDEMIA: ICD-10-CM

## 2021-02-17 DIAGNOSIS — E11.59 TYPE 2 DIABETES MELLITUS WITH OTHER CIRCULATORY COMPLICATION, WITHOUT LONG-TERM CURRENT USE OF INSULIN (HCC): ICD-10-CM

## 2021-02-17 DIAGNOSIS — I65.23 CAROTID STENOSIS, BILATERAL: ICD-10-CM

## 2021-02-17 DIAGNOSIS — E66.3 OVERWEIGHT: ICD-10-CM

## 2021-02-17 DIAGNOSIS — F43.21 ADJUSTMENT DISORDER WITH DEPRESSED MOOD: ICD-10-CM

## 2021-02-17 DIAGNOSIS — G40.909 EPILEPSY DUE TO OLD STROKE (HCC): ICD-10-CM

## 2021-02-17 DIAGNOSIS — F17.299 OTHER TOBACCO PRODUCT NICOTINE DEPENDENCE WITH NICOTINE-INDUCED DISORDER: ICD-10-CM

## 2021-02-17 DIAGNOSIS — I69.398 EPILEPSY DUE TO OLD STROKE (HCC): ICD-10-CM

## 2021-02-17 PROCEDURE — 3078F DIAST BP <80 MM HG: CPT | Performed by: FAMILY MEDICINE

## 2021-02-17 PROCEDURE — 3074F SYST BP LT 130 MM HG: CPT | Performed by: FAMILY MEDICINE

## 2021-02-17 PROCEDURE — 3725F SCREEN DEPRESSION PERFORMED: CPT | Performed by: FAMILY MEDICINE

## 2021-02-17 PROCEDURE — 90682 RIV4 VACC RECOMBINANT DNA IM: CPT

## 2021-02-17 PROCEDURE — G0439 PPPS, SUBSEQ VISIT: HCPCS | Performed by: FAMILY MEDICINE

## 2021-02-17 PROCEDURE — G0008 ADMIN INFLUENZA VIRUS VAC: HCPCS

## 2021-02-17 PROCEDURE — 99214 OFFICE O/P EST MOD 30 MIN: CPT | Performed by: FAMILY MEDICINE

## 2021-02-17 PROCEDURE — 3008F BODY MASS INDEX DOCD: CPT | Performed by: FAMILY MEDICINE

## 2021-02-17 RX ORDER — LORAZEPAM 0.5 MG/1
0.5 TABLET ORAL 2 TIMES DAILY
Qty: 30 TABLET | Refills: 0 | Status: SHIPPED | OUTPATIENT
Start: 2021-02-17 | End: 2021-05-17 | Stop reason: SDUPTHER

## 2021-02-17 NOTE — ASSESSMENT & PLAN NOTE
Has had no seizures  Seen by Neurology on 10/28/2020 and EEG was ordered for 11/04/2020  EEG showed no evidence of seizures  At the next visit they will discuss the possibility of going off the Synchroneuron

## 2021-02-17 NOTE — ASSESSMENT & PLAN NOTE
02/10/21: HbA1c = 5 9% , XYQ=316 and urine MCR=8  Lab Results   Component Value Date    HGBA1C 5 9 (H) 02/10/2021   Meds reconciled  Has lost 5 pound since his last visit

## 2021-02-17 NOTE — ASSESSMENT & PLAN NOTE
Has lost 5# since the last visit but no regular exercise  Patient is educated on the importance of portion control and dieting  Patient is also educated on the importance of exercise  Patient is encouraged to walk 30 min at least 5 times a week  Discussed about benefits of losing weight  Patient acknowledges that they understood what is discussed

## 2021-02-17 NOTE — ASSESSMENT & PLAN NOTE
Followed by neurology-Dr Macarena Julian  Last office visit was 10/28/2020 and situation has been stable

## 2021-02-17 NOTE — ASSESSMENT & PLAN NOTE
Doing well on the sertraline  Uses the lorazepam, once a day for most days, for anxiety  Much of his anxiety revolves around financial issues

## 2021-02-17 NOTE — PROGRESS NOTES
Assessment/Plan:    Hyperplastic colonic polyp  Has not yet set up follow-up colonoscopy with Dr Shirlene Frank  He needs to get this set and he will call them  Type 2 diabetes mellitus with circulatory disorder, without long-term current use of insulin ([facility] )  02/10/21: HbA1c = 5 9% , MVJ=127 and urine MCR=8  Lab Results   Component Value Date    HGBA1C 5 9 (H) 02/10/2021   Meds reconciled  Has lost 5 pound since his last visit  Nicotine dependence with nicotine-induced disorder  Still smoking cigars - 4 small ones per day  Knows that he needs to quit  Overweight  Has lost 5# since the last visit but no regular exercise  Patient is educated on the importance of portion control and dieting  Patient is also educated on the importance of exercise  Patient is encouraged to walk 30 min at least 5 times a week  Discussed about benefits of losing weight  Patient acknowledges that they understood what is discussed  Epilepsy due to old stroke [facility])  Has had no seizures  Seen by Neurology on 10/28/2020 and EEG was ordered for 11/04/2020  EEG showed no evidence of seizures  At the next visit they will discuss the possibility of going off the Bio[employer]  Hemiparesis affecting left side as late effect of cerebrovascular accident [facility])  Followed by neurology-Dr Andrew Hui  Last office visit was 10/28/2020 and situation has been stable  Benign essential hypertension  02/10/21: CMP=WNL except ZPJ=477  No HBPM   Blood pressure is controlled on present treatment regimen  Patient misses no doses and tolerates the meds without side effects  Patient avoids salt  Discussed diet, exercise and weight control  No change in present meds  Continue HBPM     Carotid stenosis, bilateral  10/2020 - appt with [facility] - Dr Nathanael Cai - stable carotids and asymptomatic  Generalized anxiety disorder  Doing well on the sertraline  Uses the lorazepam, once a day for most days, for anxiety   Much of his anxiety revolves around financial issues  Adjustment disorder with depressed mood  Stable on one 100mg tablet, once a day  Mixed hyperlipidemia  No change in medication  Diagnoses and all orders for this visit:    Medicare annual wellness visit, subsequent    Need for vaccination  -     influenza vaccine, quadrivalent, recombinant, PF, 0 5 mL, for patients 18 yr+ (FLUBLOK)    BMI 28 0-28 9,adult    Generalized anxiety disorder  -     LORazepam (ATIVAN) 0 5 mg tablet; Take 1 tablet (0 5 mg total) by mouth 2 (two) times a day As needed for anxiety    Hyperplastic colonic polyp, unspecified part of colon    Other tobacco product nicotine dependence with nicotine-induced disorder    Overweight    Epilepsy due to old stroke ([facility] )    Hemiparesis affecting left side as late effect of cerebrovascular accident ([facility] )    Benign essential hypertension  -     Comprehensive metabolic panel; Future  -     TSH, 3rd generation; Future    Carotid stenosis, bilateral    Type 2 diabetes mellitus with other circulatory complication, without long-term current use of insulin ([facility])  -     Comprehensive metabolic panel; Future  -     Hemoglobin A1C; Future    Mixed hyperlipidemia  -     Lipid Panel with Direct LDL reflex; Future  -     TSH, 3rd generation; Future    Adjustment disorder with depressed mood    Major depressive disorder with single episode, in partial remission ([facility] )    Other orders  -     Discontinue: sertraline (ZOLOFT) 50 mg tablet; Take 50 mg by mouth daily          Subjective:     Chief Complaint   Patient presents with    Medicare Wellness Visit    Diabetes        Patient ID: Akil Ellison is a 64 y o  male  HPI : Multiple Chronic Medical Conditions and Medicare AWV      The following portions of the patient's history were reviewed and updated as appropriate: allergies, current medications, past family history, past medical history, past social history, past surgical history and problem list     Review of Systems Constitutional: Negative for activity change, appetite change, fatigue, fever and unexpected weight change  HENT: Negative for congestion, dental problem and sneezing  Eyes: Negative for discharge and visual disturbance  Respiratory: Negative for cough, chest tightness, shortness of breath and wheezing  Cardiovascular: Negative for chest pain, palpitations and leg swelling  Gastrointestinal: Negative for abdominal pain, constipation, diarrhea, nausea and vomiting  Endocrine: Negative for polydipsia and polyuria  Genitourinary: Negative for dysuria and frequency  Musculoskeletal: Negative for arthralgias  Skin: Negative for rash  Allergic/Immunologic: Negative for environmental allergies and food allergies  Neurological: Positive for weakness  Negative for light-headedness and headaches  Hematological: Negative for adenopathy  Psychiatric/Behavioral: Negative for behavioral problems and sleep disturbance  Objective:  Vitals:    02/17/21 1228   BP: 122/72   BP Location: Left arm   Patient Position: Sitting   Cuff Size: Standard   Pulse: 88   Resp: 18   Temp: 97 5 °F (36 4 °C)   TempSrc: Tympanic   SpO2: 96%   Weight: 85 kg (187 lb 6 4 oz)   Height: 5' 8" (1 727 m)      Physical Exam  Vitals signs reviewed  Constitutional:       Appearance: Normal appearance  He is well-developed  He is not ill-appearing  HENT:      Head: Normocephalic and atraumatic  Nose: Nose normal    Eyes:      Conjunctiva/sclera: Conjunctivae normal       Pupils: Pupils are equal, round, and reactive to light  Neck:      Musculoskeletal: Normal range of motion and neck supple  Thyroid: No thyromegaly  Cardiovascular:      Rate and Rhythm: Normal rate and regular rhythm  Heart sounds: Normal heart sounds  Comments: APR = 72  Pulmonary:      Effort: Pulmonary effort is normal       Breath sounds: Normal breath sounds     Abdominal:      General: Bowel sounds are normal  Palpations: Abdomen is soft  There is no mass  Tenderness: There is no abdominal tenderness  Genitourinary:     Penis: No tenderness  Rectum: Normal    Musculoskeletal: Normal range of motion  General: No tenderness  Right lower leg: No edema  Left lower leg: No edema  Lymphadenopathy:      Cervical: No cervical adenopathy  Skin:     General: Skin is warm and dry  Coloration: Skin is not pale  Findings: No erythema or rash  Neurological:      General: No focal deficit present  Mental Status: He is alert and oriented to person, place, and time  Cranial Nerves: No cranial nerve deficit  Motor: Weakness present  Deep Tendon Reflexes: Reflexes are normal and symmetric  Comments: Walks without an appliance  Same left hemiparesis: arm > leg  Psychiatric:         Mood and Affect: Mood normal          Behavior: Behavior normal          Thought Content: Thought content normal          Judgment: Judgment normal            Patient Instructions     No change in medications, except decrease the gabapentin to once a day for 5 days then stop it completely  It does not appear to me that this low dosage is doing you very much good  Continue diet and exercises program   Medicare Preventive Visit Patient Instructions  Thank you for completing your Welcome to Medicare Visit or Medicare Annual Wellness Visit today  Your next wellness visit will be due in one year (2/17/2022)  The screening/preventive services that you may require over the next 5-10 years are detailed below  Some tests may not apply to you based off risk factors and/or age  Screening tests ordered at today's visit but not completed yet may show as past due  Also, please note that scanned in results may not display below    Preventive Screenings:  Service Recommendations Previous Testing/Comments   Colorectal Cancer Screening  · Colonoscopy    · Fecal Occult Blood Test (FOBT)/Fecal Immunochemical Test (FIT)  · Fecal DNA/Cologuard Test  · Flexible Sigmoidoscopy Age: 54-65 years old   Colonoscopy: every 10 years (May be performed more frequently if at higher risk)  OR  FOBT/FIT: every 1 year  OR  Cologuard: every 3 years  OR  Sigmoidoscopy: every 5 years  Screening may be recommended earlier than age 48 if at higher risk for colorectal cancer  Also, an individualized decision between you and your healthcare provider will decide whether screening between the ages of 74-80 would be appropriate  Colonoscopy: 10/27/2016  FOBT/FIT: Not on file  Cologuard: Not on file  Sigmoidoscopy: Not on file    Screening Current     Prostate Cancer Screening Individualized decision between patient and health care provider in men between ages of 53-78   Medicare will cover every 12 months beginning on the day after your 50th birthday PSA: 1 5 ng/mL     Screening Current     Hepatitis C Screening Once for adults born between 1945 and 1965  More frequently in patients at high risk for Hepatitis C Hep C Antibody: 11/26/2018    Screening Current   Diabetes Screening 1-2 times per year if you're at risk for diabetes or have pre-diabetes Fasting glucose: 143 mg/dL   A1C: 5 9 %    History Diabetes  Screening Current   Cholesterol Screening Once every 5 years if you don't have a lipid disorder  May order more often based on risk factors  Lipid panel: 05/01/2020    History Lipid Disorder  Screening Current      Other Preventive Screenings Covered by Medicare:  1  Abdominal Aortic Aneurysm (AAA) Screening: covered once if your at risk  You're considered to be at risk if you have a family history of AAA or a male between the age of 73-68 who smoking at least 100 cigarettes in your lifetime    2  Lung Cancer Screening: covers low dose CT scan once per year if you meet all of the following conditions: (1) Age 50-69; (2) No signs or symptoms of lung cancer; (3) Current smoker or have quit smoking within the last 15 years; (4) You have a tobacco smoking history of at least 30 pack years (packs per day x number of years you smoked); (5) You get a written order from a healthcare provider  3  Glaucoma Screening: covered annually if you're considered high risk: (1) You have diabetes OR (2) Family history of glaucoma OR (3)  aged 48 and older OR (3)  American aged 72 and older  3  Osteoporosis Screening: covered every 2 years if you meet one of the following conditions: (1) Have a vertebral abnormality; (2) On glucocorticoid therapy for more than 3 months; (3) Have primary hyperparathyroidism; (4) On osteoporosis medications and need to assess response to drug therapy  5  HIV Screening: covered annually if you're between the age of 12-76  Also covered annually if you are younger than 13 and older than 72 with risk factors for HIV infection  For pregnant patients, it is covered up to 3 times per pregnancy  Immunizations:  Immunization Recommendations   Influenza Vaccine Annual influenza vaccination during flu season is recommended for all persons aged >= 6 months who do not have contraindications   Pneumococcal Vaccine (Prevnar and Pneumovax)  * Prevnar = PCV13  * Pneumovax = PPSV23 Adults 25-60 years old: 1-3 doses may be recommended based on certain risk factors  Adults 72 years old: Prevnar (PCV13) vaccine recommended followed by Pneumovax (PPSV23) vaccine  If already received PPSV23 since turning 65, then PCV13 recommended at least one year after PPSV23 dose  Hepatitis B Vaccine 3 dose series if at intermediate or high risk (ex: diabetes, end stage renal disease, liver disease)   Tetanus (Td) Vaccine - COST NOT COVERED BY MEDICARE PART B Following completion of primary series, a booster dose should be given every 10 years to maintain immunity against tetanus  Td may also be given as tetanus wound prophylaxis  Tdap Vaccine - COST NOT COVERED BY MEDICARE PART B Recommended at least once for all adults   For pregnant patients, recommended with each pregnancy  Shingles Vaccine (Shingrix) - COST NOT COVERED BY MEDICARE PART B  2 shot series recommended in those aged 48 and above     Health Maintenance Due:      Topic Date Due    Colonoscopy Surveillance  03/17/2021 (Originally 10/27/2019)    Colorectal Cancer Screening  10/27/2026    HIV Screening  Completed    Hepatitis C Screening  Completed     Immunizations Due:      Topic Date Due    Influenza Vaccine (1) 09/01/2020     Advance Directives   What are advance directives? Advance directives are legal documents that state your wishes and plans for medical care  These plans are made ahead of time in case you lose your ability to make decisions for yourself  Advance directives can apply to any medical decision, such as the treatments you want, and if you want to donate organs  What are the types of advance directives? There are many types of advance directives, and each state has rules about how to use them  You may choose a combination of any of the following:  · Living will: This is a written record of the treatment you want  You can also choose which treatments you do not want, which to limit, and which to stop at a certain time  This includes surgery, medicine, IV fluid, and tube feedings  · Durable power of  for healthcare [facility]): This is a written record that states who you want to make healthcare choices for you when you are unable to make them for yourself  This person, called a proxy, is usually a family member or a friend  You may choose more than 1 proxy  · Do not resuscitate (DNR) order:  A DNR order is used in case your heart stops beating or you stop breathing  It is a request not to have certain forms of treatment, such as CPR  A DNR order may be included in other types of advance directives  · Medical directive: This covers the care that you want if you are in a coma, near death, or unable to make decisions for yourself   You can list the treatments you want for each condition  Treatment may include pain medicine, surgery, blood transfusions, dialysis, IV or tube feedings, and a ventilator (breathing machine)  · Values history: This document has questions about your views, beliefs, and how you feel and think about life  This information can help others choose the care that you would choose  Why are advance directives important? An advance directive helps you control your care  Although spoken wishes may be used, it is better to have your wishes written down  Spoken wishes can be misunderstood, or not followed  Treatments may be given even if you do not want them  An advance directive may make it easier for your family to make difficult choices about your care  Cigarette Smoking and Your Health   Risks to your health if you smoke:  Nicotine and other chemicals found in tobacco damage every cell in your body  Even if you are a light smoker, you have an increased risk for cancer, heart disease, and lung disease  If you are pregnant or have diabetes, smoking increases your risk for complications  Benefits to your health if you stop smoking:   · You decrease respiratory symptoms such as coughing, wheezing, and shortness of breath  · You reduce your risk for cancers of the lung, mouth, throat, kidney, bladder, pancreas, stomach, and cervix  If you already have cancer, you increase the benefits of chemotherapy  You also reduce your risk for cancer returning or a second cancer from developing  · You reduce your risk for heart disease, blood clots, heart attack, and stroke  · You reduce your risk for lung infections, and diseases such as pneumonia, asthma, chronic bronchitis, and emphysema  · Your circulation improves  More oxygen can be delivered to your body  If you have diabetes, you lower your risk for complications, such as kidney, artery, and eye diseases  You also lower your risk for nerve damage   Nerve damage can lead to amputations, poor vision, and blindness  · You improve your body's ability to heal and to fight infections  For more information and support to stop smoking:   · [employer]  Phone: 2- 504 - 961-2119  Web Address: [employer]  How to Quit Using Smokeless Tobacco   Why it is important to stop using smokeless tobacco:  Smokeless tobacco comes in many forms  Examples include chew, snuff, dip, dissolvable tobacco, and snus  All smokeless tobacco products contain nicotine and may contain as much nicotine as 3 cigarettes  You may be physically dependent on nicotine  You may also be emotionally addicted to it  The cravings can be strong, but it is important to quit using smokeless tobacco  You will improve your health and decrease your cancer, stroke, and heart attack risk  Mouth sores and tooth problems will also improve when you quit  You can benefit from quitting no matter how long you have used smokeless tobacco    Prepare to stop using smokeless tobacco:  Nicotine is a highly addictive drug  Withdrawal symptoms can happen when you stop and make it hard to quit  The following can help keep you on track:  · Set a quit date  · Tell friends, family, and coworkers that you plan to quit  · Remove all smokeless tobacco products from your home, car, and workplace  Manage weight gain after you quit:  Nicotine can affect your metabolism  You may gain a few pounds after you quit  The following can help you control your weight:  · Eat healthy foods  · Drink water before, during, and between meals  · Exercise as directed  Weight Management   Why it is important to manage your weight:  Being overweight increases your risk of health conditions such as heart disease, high blood pressure, type 2 diabetes, and certain types of cancer  It can also increase your risk for osteoarthritis, sleep apnea, and other respiratory problems  Aim for a slow, steady weight loss   Even a small amount of weight loss can lower your risk of health problems  How to lose weight safely:  A safe and healthy way to lose weight is to eat fewer calories and get regular exercise  You can lose up about 1 pound a week by decreasing the number of calories you eat by 500 calories each day  Healthy meal plan for weight management:  A healthy meal plan includes a variety of foods, contains fewer calories, and helps you stay healthy  A healthy meal plan includes the following:  · Eat whole-grain foods more often  A healthy meal plan should contain fiber  Fiber is the part of grains, fruits, and vegetables that is not broken down by your body  Whole-grain foods are healthy and provide extra fiber in your diet  Some examples of whole-grain foods are whole-wheat breads and pastas, oatmeal, brown rice, and bulgur  · Eat a variety of vegetables every day  Include dark, leafy greens such as spinach, kale, ney greens, and mustard greens  Eat yellow and orange vegetables such as carrots, sweet potatoes, and winter squash  · Eat a variety of fruits every day  Choose fresh or canned fruit (canned in its own juice or light syrup) instead of juice  Fruit juice has very little or no fiber  · Eat low-fat dairy foods  Drink fat-free (skim) milk or 1% milk  Eat fat-free yogurt and low-fat cottage cheese  Try low-fat cheeses such as mozzarella and other reduced-fat cheeses  · Choose meat and other protein foods that are low in fat  Choose beans or other legumes such as split peas or lentils  Choose fish, skinless poultry (chicken or turkey), or lean cuts of red meat (beef or pork)  Before you cook meat or poultry, cut off any visible fat  · Use less fat and oil  Try baking foods instead of frying them  Add less fat, such as margarine, sour cream, regular salad dressing and mayonnaise to foods  Eat fewer high-fat foods  Some examples of high-fat foods include french fries, doughnuts, ice cream, and cakes  · Eat fewer sweets    Limit foods and drinks that are high in sugar  This includes candy, cookies, regular soda, and sweetened drinks  Exercise:  Exercise at least 30 minutes per day on most days of the week  Some examples of exercise include walking, biking, dancing, and swimming  You can also fit in more physical activity by taking the stairs instead of the elevator or parking farther away from stores  Ask your healthcare provider about the best exercise plan for you  © Copyright [employer] 2018 Information is for End User's use only and may not be sold, redistributed or otherwise used for commercial purposes   All illustrations and images included in CareNotes® are the copyrighted property of A D A M , Inc  or [address] [place] [address]

## 2021-02-17 NOTE — ASSESSMENT & PLAN NOTE
Has not yet set up follow-up colonoscopy with Dr [facility]  He needs to get this set and he will call them

## 2021-02-17 NOTE — PROGRESS NOTES
Assessment and Plan:     Problem List Items Addressed This Visit     None      Visit Diagnoses     Medicare annual wellness visit, subsequent    -  Primary    Need for vaccination               Preventive health issues were discussed with patient, and age appropriate screening tests were ordered as noted in patient's After Visit Summary  Personalized health advice and appropriate referrals for health education or preventive services given if needed, as noted in patient's After Visit Summary       History of Present Illness:     Patient presents for Medicare Annual Wellness visit    Patient Care Team:  Xavier Christine MD as PCP - General (Family Medicine)  Xavier Christine MD as PCP - [address] [place] [address] ([facility])  Donna Crawford MD     Problem List:     Patient Active Problem List   Diagnosis    Hemiparesis affecting left side as late effect of cerebrovascular accident [facility])    Carotid stenosis, bilateral    Nicotine dependence with nicotine-induced disorder    Benign essential hypertension    Cerebral artery occlusion with cerebral infarction ([facility] )    Depressive disorder    Type 2 diabetes mellitus with circulatory disorder, without long-term current use of insulin ([facility] )    Generalized anxiety disorder    History of cerebrovascular accident with residual deficit    Mixed hyperlipidemia    Overweight    Patent foramen ovale    Hyperplastic colonic polyp    Benign prostatic hyperplasia without lower urinary tract symptoms    Major depressive disorder with single episode, in partial remission [facility]    Adjustment disorder with depressed mood    Occlusion of carotid artery without cerebral infarction, right    Epilepsy due to old stroke [facility])      Past Medical and Surgical History:     Past Medical History:   Diagnosis Date    CAD (coronary artery disease) 01/15/2014    100% occlusion of right    CVA [facility]cere[facility]) [facility] ) 01/15/2014    Depressive disorder     Diabetes ([facility] )     Dyslipidemia     Erectile dysfunction 2009    Hemiparesis ([facility] ) 01/15/2014    left    Hypertension     [facility]iz[facility] 2014    Stroke [facility])      Past Surgical History:   Procedure Laterality Date    NO PAST SURGERIES        Family History:     Family History   Problem Relation Age of Onset    Hypertension Family     Diabetes Family     Cancer Mother     Diabetes Mother     Diabetes Father     Diabetes Brother       Social History:     E-Cigarette/Vaping    E-Cigarette Use Never User      E-Cigarette/Vaping Substances    Nicotine No     THC No     CBD No     Flavoring No     Other No     Unknown No      Social History     Socioeconomic History    Marital status: Single     Spouse name: None    Number of children: None    Years of education: None    Highest education level: None   Occupational History    None   Social Needs    Financial resource strain: None    Food insecurity     Worry: None     Inability: None    Transportation needs     Medical: None     Non-medical: None   Tobacco Use    Smoking status: Current Every Day Smoker     Packs/day: 0 25     Types: Cigars     Last attempt to quit: 2014     Years since quittin 1    Smokeless tobacco: Current User    Tobacco comment: heavy tob smoke - 3 cigars day - quit 14- no passive smoke exposure as per [employer]   Substance and Sexual Activity    Alcohol use: Yes     Comment: occasional    Drug use: No    Sexual activity: Not Currently   Lifestyle    Physical activity     Days per week: None     Minutes per session: None    Stress: None   Relationships    Social connections     Talks on phone: None     Gets together: None     Attends [employer] service: None     Active member of club or organization: None     Attends meetings of clubs or organizations: None     Relationship status: None    Intimate partner violence     Fear of current or ex partner: None     Emotionally abused: None     Physically abused: None Forced sexual activity: None   Other Topics Concern    None   Social History Narrative    None      Medications and Allergies:     Current Outpatient Medications   Medication Sig Dispense Refill    aspirin 81 mg chewable tablet Chew 1 tablet every 24 hours      atorvastatin (LIPITOR) 20 mg tablet Take 1 tablet (20 mg total) by mouth daily 90 tablet 2    ezetimibe (ZETIA) 10 mg tablet Take 1 tablet (10 mg total) by mouth daily 90 tablet 3    gabapentin (NEURONTIN) 100 mg capsule Take 1 capsule (100 mg total) by mouth 2 (two) times a day 180 capsule 1    glucose blood test strip Use as instructed  ONE TOUCH ULTRA 100 each 5    Lancets (ONETOUCH ULTRASOFT) lancets daily      levETIRAcetam (KEPPRA) 1000 MG tablet Take 1 tablet (1,000 mg total) by mouth 2 (two) times a day 180 tablet 3    LORazepam (ATIVAN) 0 5 mg tablet Take 1 tablet (0 5 mg total) by mouth 2 (two) times a day As needed for anxiety 30 tablet 0    losartan (COZAAR) 50 mg tablet Take 1 tablet (50 mg total) by mouth daily 90 tablet 3    metFORMIN (GLUCOPHAGE) 1000 MG tablet Take 1 tablet (1,000 mg total) by mouth 2 (two) times a day with meals 180 tablet 2    sertraline (ZOLOFT) 50 mg tablet Take 50 mg by mouth daily      losartan (COZAAR) 100 MG tablet Take 50 mg by mouth daily      sertraline (ZOLOFT) 100 mg tablet Take 1 tablet (100 mg total) by mouth daily (Patient taking differently: Take 50 mg by mouth daily ) 90 tablet 1    sertraline (ZOLOFT) 25 mg tablet Take 25 mg by mouth daily       No current facility-administered medications for this visit        Allergies   Allergen Reactions    No Known Allergies       Immunizations:     Immunization History   Administered Date(s) Administered    INFLUENZA 09/16/2015, 09/26/2016, 09/26/2016, 09/14/2017, 09/14/2017, 11/12/2018    Influenza, injectable, quadrivalent, preservative free 0 5 mL 09/16/2019    Influenza, recombinant, quadrivalent,injectable, preservative free 11/12/2018    Influenza, seasonal, injectable 12/15/2014, 09/16/2015    Pneumococcal Polysaccharide PPV23 01/22/2020    Tdap 09/14/2012      Health Maintenance:         Topic Date Due    Colonoscopy Surveillance  10/27/2019    Colorectal Cancer Screening  10/27/2026    HIV Screening  Completed    Hepatitis C Screening  Completed         Topic Date Due    Influenza Vaccine (1) 09/01/2020      Medicare Health Risk Assessment:     /72 (BP Location: Left arm, Patient Position: Sitting, Cuff Size: Standard)   Pulse 88   Temp 97 5 °F (36 4 °C) (Tympanic)   Resp 18   Ht 5' 8" (1 727 m)   Wt 85 kg (187 lb 6 4 oz)   SpO2 96%   BMI 28 49 kg/m²      Jan Gil is here for his Subsequent Wellness visit  Health Risk Assessment:   Patient rates overall health as very good  Patient feels that their physical health rating is much better  Eyesight was rated as same  Hearing was rated as same  Patient feels that their emotional and mental health rating is same  Pain experienced in the last 7 days has been none  Patient states that he has experienced no weight loss or gain in last 6 months  Depression Screening:   PHQ-2 Score: 0  PHQ-9 Score: 0      Fall Risk Screening: In the past year, patient has experienced: history of falling in past year    Number of falls: 1  Injured during fall?: No      Home Safety:  Patient does not have trouble with stairs inside or outside of their home  Patient has working smoke alarms and has working carbon monoxide detector  Home safety hazards include: none  Nutrition:   Current diet is Regular  Medications:   Patient is currently taking over-the-counter supplements  OTC medications include: see medication list  Patient is able to manage medications  Activities of Daily Living (ADLs)/Instrumental Activities of Daily Living (IADLs):   Walk and transfer into and out of bed and chair?: Yes  Dress and groom yourself?: Yes    Bathe or shower yourself?: Yes    Feed yourself?  Yes  Do your laundry/housekeeping?: Yes  Manage your money, pay your bills and track your expenses?: Yes  Make your own meals?: Yes    Do your own shopping?: Yes    Previous Hospitalizations:   Any hospitalizations or ED visits within the last 12 months?: No      Advance Care Planning:   Living will: No    Durable POA for healthcare: No    Advanced directive: No    Advanced directive counseling given: Yes    Five wishes given: Yes    End of Life Decisions reviewed with patient: Yes      Comments: Discussed Advanced Directive, including a Living Will and the appointment of a Medical POA  I gave the person examples of both  When they have these, we want a copy  Cognitive Screening:   Provider or family/friend/caregiver concerned regarding cognition?: No    PREVENTIVE SCREENINGS      Cardiovascular Screening:    General: History Lipid Disorder and Screening Current      Diabetes Screening:     General: History Diabetes and Screening Current      Colorectal Cancer Screening:     General: Screening Current      Prostate Cancer Screening:    General: Screening Current      Osteoporosis Screening:    General: Screening Not Indicated      Abdominal Aortic Aneurysm (AAA) Screening:    Risk factors include: tobacco use        General: Screening Not Indicated      Lung Cancer Screening:     General: Screening Not Indicated      Hepatitis C Screening:    General: Screening Current    Other Counseling Topics:   Alcohol use counseling, car/seat belt/driving safety, skin self-exam, sunscreen and calcium and vitamin D intake and regular weightbearing exercise  Patient's shoes and socks removed  Right Foot/Ankle   Right Foot Inspection  Skin Exam: skin normal and skin intact no dry skin, no warmth, no callus, no erythema, no maceration, no abnormal color, no pre-ulcer, no ulcer and no callus                          Toe Exam: ROM and strength within normal limits  Sensory       Monofilament testing: intact  Vascular    The right DP pulse is 1+  The right PT pulse is 1+  Left Foot/Ankle  Left Foot Inspection  Skin Exam: skin normal and skin intactno dry skin, no warmth, no erythema, no maceration, normal color, no pre-ulcer, no ulcer and no callus                         Toe Exam: ROM and strength within normal limits                   Sensory       Monofilament: diminished  Vascular    The left DP pulse is 1+  The left PT pulse is 1+  Assign Risk Category:  No deformity present; No loss of protective sensation; Weak pulses       Risk: 0      Gonzalo Mendoza MD BMI Counseling: Body mass index is 28 49 kg/m²  The BMI is above normal  Nutrition recommendations include reducing portion sizes, decreasing overall calorie intake, 3-5 servings of fruits/vegetables daily, reducing fast food intake, consuming healthier snacks, decreasing soda and/or juice intake, moderation in carbohydrate intake, increasing intake of lean protein, reducing intake of saturated fat and trans fat and reducing intake of cholesterol  Exercise recommendations include moderate aerobic physical activity for 150 minutes/week, exercising 3-5 times per week and strength training exercises

## 2021-02-17 NOTE — ASSESSMENT & PLAN NOTE
02/10/21: CMP=WNL except WUB=[id number]  No HBPM   Blood pressure is controlled on present treatment regimen  Patient misses no doses and tolerates the meds without side effects  Patient avoids salt  Discussed diet, exercise and weight control  No change in present meds   Continue HBPM

## 2021-02-17 NOTE — PATIENT INSTRUCTIONS
No change in medications, except decrease the gabapentin to once a day for 5 days then stop it completely  It does not appear to me that this low dosage is doing you very much good  Continue diet and exercises program   Medicare Preventive Visit Patient Instructions  Thank you for completing your Welcome to Medicare Visit or Medicare Annual Wellness Visit today  Your next wellness visit will be due in one year (2/17/2022)  The screening/preventive services that you may require over the next 5-10 years are detailed below  Some tests may not apply to you based off risk factors and/or age  Screening tests ordered at today's visit but not completed yet may show as past due  Also, please note that scanned in results may not display below  Preventive Screenings:  Service Recommendations Previous Testing/Comments   Colorectal Cancer Screening  · Colonoscopy    · Fecal Occult Blood Test (FOBT)/Fecal Immunochemical Test (FIT)  · Fecal DNA/Cologuard Test  · Flexible Sigmoidoscopy Age: 54-65 years old   Colonoscopy: every 10 years (May be performed more frequently if at higher risk)  OR  FOBT/FIT: every 1 year  OR  Cologuard: every 3 years  OR  Sigmoidoscopy: every 5 years  Screening may be recommended earlier than age 48 if at higher risk for colorectal cancer  Also, an individualized decision between you and your healthcare provider will decide whether screening between the ages of 74-80 would be appropriate   Colonoscopy: 10/27/2016  FOBT/FIT: Not on file  Cologuard: Not on file  Sigmoidoscopy: Not on file    Screening Current     Prostate Cancer Screening Individualized decision between patient and health care provider in men between ages of 53-78   Medicare will cover every 12 months beginning on the day after your 50th birthday PSA: 1 5 ng/mL     Screening Current     Hepatitis C Screening Once for adults born between [facility]  More frequently in patients at high risk for Hepatitis C Hep C Antibody: 11/26/2018    Screening Current   Diabetes Screening 1-2 times per year if you're at risk for diabetes or have pre-diabetes Fasting glucose: 143 mg/dL   A1C: 5 9 %    History Diabetes  Screening Current   Cholesterol Screening Once every 5 years if you don't have a lipid disorder  May order more often based on risk factors  Lipid panel: 05/01/2020    History Lipid Disorder  Screening Current      Other Preventive Screenings Covered by Medicare:  1  Abdominal Aortic Aneurysm (AAA) Screening: covered once if your at risk  You're considered to be at risk if you have a family history of AAA or a male between the age of 73-68 who smoking at least 100 cigarettes in your lifetime  2  Lung Cancer Screening: covers low dose CT scan once per year if you meet all of the following conditions: (1) Age 50-69; (2) No signs or symptoms of lung cancer; (3) Current smoker or have quit smoking within the last 15 years; (4) You have a tobacco smoking history of at least 30 pack years (packs per day x number of years you smoked); (5) You get a written order from a healthcare provider  3  Glaucoma Screening: covered annually if you're considered high risk: (1) You have diabetes OR (2) Family history of glaucoma OR (3)  aged 48 and older OR (3)  American aged 72 and older  3  Osteoporosis Screening: covered every 2 years if you meet one of the following conditions: (1) Have a vertebral abnormality; (2) On glucocorticoid therapy for more than 3 months; (3) Have primary hyperparathyroidism; (4) On osteoporosis medications and need to assess response to drug therapy  5  HIV Screening: covered annually if you're between the age of 12-76  Also covered annually if you are younger than 13 and older than 72 with risk factors for HIV infection  For pregnant patients, it is covered up to 3 times per pregnancy      Immunizations:  Immunization Recommendations   Influenza Vaccine Annual influenza vaccination during flu season is recommended for all persons aged >= 6 months who do not have contraindications   Pneumococcal Vaccine (Prevnar and Pneumovax)  * Prevnar = PCV13  * Pneumovax = PPSV23 Adults 25-60 years old: 1-3 doses may be recommended based on certain risk factors  Adults 72 years old: Prevnar (PCV13) vaccine recommended followed by Pneumovax (PPSV23) vaccine  If already received PPSV23 since turning 65, then PCV13 recommended at least one year after PPSV23 dose  Hepatitis B Vaccine 3 dose series if at intermediate or high risk (ex: diabetes, end stage renal disease, liver disease)   Tetanus (Td) Vaccine - COST NOT COVERED BY MEDICARE PART B Following completion of primary series, a booster dose should be given every 10 years to maintain immunity against tetanus  Td may also be given as tetanus wound prophylaxis  Tdap Vaccine - COST NOT COVERED BY MEDICARE PART B Recommended at least once for all adults  For pregnant patients, recommended with each pregnancy  Shingles Vaccine (Shingrix) - COST NOT COVERED BY MEDICARE PART B  2 shot series recommended in those aged 48 and above     Health Maintenance Due:      Topic Date Due    Colonoscopy Surveillance  03/17/2021 (Originally 10/27/2019)    Colorectal Cancer Screening  10/27/2026    HIV Screening  Completed    Hepatitis C Screening  Completed     Immunizations Due:      Topic Date Due    Influenza Vaccine (1) 09/01/2020     Advance Directives   What are advance directives? Advance directives are legal documents that state your wishes and plans for medical care  These plans are made ahead of time in case you lose your ability to make decisions for yourself  Advance directives can apply to any medical decision, such as the treatments you want, and if you want to donate organs  What are the types of advance directives? There are many types of advance directives, and each state has rules about how to use them   You may choose a combination of any of the following:  · Living will: This is a written record of the treatment you want  You can also choose which treatments you do not want, which to limit, and which to stop at a certain time  This includes surgery, medicine, IV fluid, and tube feedings  · Durable power of  for healthcare [facility]): This is a written record that states who you want to make healthcare choices for you when you are unable to make them for yourself  This person, called a proxy, is usually a family member or a friend  You may choose more than 1 proxy  · Do not resuscitate (DNR) order:  A DNR order is used in case your heart stops beating or you stop breathing  It is a request not to have certain forms of treatment, such as CPR  A DNR order may be included in other types of advance directives  · Medical directive: This covers the care that you want if you are in a coma, near death, or unable to make decisions for yourself  You can list the treatments you want for each condition  Treatment may include pain medicine, surgery, blood transfusions, dialysis, IV or tube feedings, and a ventilator (breathing machine)  · Values history: This document has questions about your views, beliefs, and how you feel and think about life  This information can help others choose the care that you would choose  Why are advance directives important? An advance directive helps you control your care  Although spoken wishes may be used, it is better to have your wishes written down  Spoken wishes can be misunderstood, or not followed  Treatments may be given even if you do not want them  An advance directive may make it easier for your family to make difficult choices about your care  Cigarette Smoking and Your Health   Risks to your health if you smoke:  Nicotine and other chemicals found in tobacco damage every cell in your body  Even if you are a light smoker, you have an increased risk for cancer, heart disease, and lung disease   If you are pregnant or have diabetes, smoking increases your risk for complications  Benefits to your health if you stop smoking:   · You decrease respiratory symptoms such as coughing, wheezing, and shortness of breath  · You reduce your risk for cancers of the lung, mouth, throat, kidney, bladder, pancreas, stomach, and cervix  If you already have cancer, you increase the benefits of chemotherapy  You also reduce your risk for cancer returning or a second cancer from developing  · You reduce your risk for heart disease, blood clots, heart attack, and stroke  · You reduce your risk for lung infections, and diseases such as pneumonia, asthma, chronic bronchitis, and emphysema  · Your circulation improves  More oxygen can be delivered to your body  If you have diabetes, you lower your risk for complications, such as kidney, artery, and eye diseases  You also lower your risk for nerve damage  Nerve damage can lead to amputations, poor vision, and blindness  · You improve your body's ability to heal and to fight infections  For more information and support to stop smoking:   · [employer]  Phone: 0- 830 - [phone]  Web Address: [place]  How to Quit Using Smokeless Tobacco   Why it is important to stop using smokeless tobacco:  Smokeless tobacco comes in many forms  Examples include chew, snuff, dip, dissolvable tobacco, and snus  All smokeless tobacco products contain nicotine and may contain as much nicotine as 3 cigarettes  You may be physically dependent on nicotine  You may also be emotionally addicted to it  The cravings can be strong, but it is important to quit using smokeless tobacco  You will improve your health and decrease your cancer, stroke, and heart attack risk  Mouth sores and tooth problems will also improve when you quit  You can benefit from quitting no matter how long you have used smokeless tobacco    Prepare to stop using smokeless tobacco:  Nicotine is a highly addictive drug  Withdrawal symptoms can happen when you stop and make it hard to quit  The following can help keep you on track:  · Set a quit date  · Tell friends, family, and coworkers that you plan to quit  · Remove all smokeless tobacco products from your home, car, and workplace  Manage weight gain after you quit:  Nicotine can affect your metabolism  You may gain a few pounds after you quit  The following can help you control your weight:  · Eat healthy foods  · Drink water before, during, and between meals  · Exercise as directed  Weight Management   Why it is important to manage your weight:  Being overweight increases your risk of health conditions such as heart disease, high blood pressure, type 2 diabetes, and certain types of cancer  It can also increase your risk for osteoarthritis, sleep apnea, and other respiratory problems  Aim for a slow, steady weight loss  Even a small amount of weight loss can lower your risk of health problems  How to lose weight safely:  A safe and healthy way to lose weight is to eat fewer calories and get regular exercise  You can lose up about 1 pound a week by decreasing the number of calories you eat by 500 calories each day  Healthy meal plan for weight management:  A healthy meal plan includes a variety of foods, contains fewer calories, and helps you stay healthy  A healthy meal plan includes the following:  · Eat whole-grain foods more often  A healthy meal plan should contain fiber  Fiber is the part of grains, fruits, and vegetables that is not broken down by your body  Whole-grain foods are healthy and provide extra fiber in your diet  Some examples of whole-grain foods are whole-wheat breads and pastas, oatmeal, brown rice, and bulgur  · Eat a variety of vegetables every day  Include dark, leafy greens such as spinach, kale, ney greens, and mustard greens  Eat yellow and orange vegetables such as carrots, sweet potatoes, and winter squash     · Eat a variety of fruits every day  Choose fresh or canned fruit (canned in its own juice or light syrup) instead of juice  Fruit juice has very little or no fiber  · Eat low-fat dairy foods  Drink fat-free (skim) milk or 1% milk  Eat fat-free yogurt and low-fat cottage cheese  Try low-fat cheeses such as mozzarella and other reduced-fat cheeses  · Choose meat and other protein foods that are low in fat  Choose beans or other legumes such as split peas or lentils  Choose fish, skinless poultry (chicken or turkey), or lean cuts of red meat (beef or pork)  Before you cook meat or poultry, cut off any visible fat  · Use less fat and oil  Try baking foods instead of frying them  Add less fat, such as margarine, sour cream, regular salad dressing and mayonnaise to foods  Eat fewer high-fat foods  Some examples of high-fat foods include french fries, doughnuts, ice cream, and cakes  · Eat fewer sweets  Limit foods and drinks that are high in sugar  This includes candy, cookies, regular soda, and sweetened drinks  Exercise:  Exercise at least 30 minutes per day on most days of the week  Some examples of exercise include walking, biking, dancing, and swimming  You can also fit in more physical activity by taking the stairs instead of the elevator or parking farther away from stores  Ask your healthcare provider about the best exercise plan for you  © Copyright [address] 2018 Information is for End User's use only and may not be sold, redistributed or otherwise used for commercial purposes   All illustrations and images included in CareNotes® are the copyrighted property of A D A M , Inc  or [address] [place] [address]

## 2021-04-15 ENCOUNTER — HOSPITAL ENCOUNTER (OUTPATIENT)
Dept: NON INVASIVE DIAGNOSTICS | Facility: HOSPITAL | Age: 61
Discharge: HOME | End: 2021-04-15
Attending: SURGERY
Payer: COMMERCIAL

## 2021-04-15 ENCOUNTER — IMMUNIZATIONS (OUTPATIENT)
Dept: FAMILY MEDICINE CLINIC | Facility: HOSPITAL | Age: 61
End: 2021-04-15
Payer: COMMERCIAL

## 2021-04-15 DIAGNOSIS — I65.23 CAROTID STENOSIS, BILATERAL: ICD-10-CM

## 2021-04-15 DIAGNOSIS — Z23 ENCOUNTER FOR IMMUNIZATION: Primary | ICD-10-CM

## 2021-04-15 PROCEDURE — 93880 EXTRACRANIAL BILAT STUDY: CPT

## 2021-04-15 PROCEDURE — 91301 SARS-COV-2 / COVID-19 MRNA VACCINE (MODERNA) 100 MCG: CPT

## 2021-04-15 PROCEDURE — 0011A SARS-COV-2 / COVID-19 MRNA VACCINE (MODERNA) 100 MCG: CPT

## 2021-04-15 PROCEDURE — 93888 INTRACRANIAL LIMITED STUDY: CPT | Performed by: SURGERY

## 2021-04-26 ENCOUNTER — OFFICE VISIT (OUTPATIENT)
Dept: NEUROLOGY | Facility: CLINIC | Age: 61
End: 2021-04-26
Payer: COMMERCIAL

## 2021-04-26 VITALS
HEIGHT: 68 IN | HEART RATE: 70 BPM | BODY MASS INDEX: 28.95 KG/M2 | DIASTOLIC BLOOD PRESSURE: 70 MMHG | WEIGHT: 191 LBS | SYSTOLIC BLOOD PRESSURE: 120 MMHG

## 2021-04-26 DIAGNOSIS — I69.398 EPILEPSY DUE TO OLD STROKE (HCC): ICD-10-CM

## 2021-04-26 DIAGNOSIS — G40.909 EPILEPSY DUE TO OLD STROKE (HCC): ICD-10-CM

## 2021-04-26 PROCEDURE — 99214 OFFICE O/P EST MOD 30 MIN: CPT | Performed by: PSYCHIATRY & NEUROLOGY

## 2021-04-26 PROCEDURE — 3008F BODY MASS INDEX DOCD: CPT | Performed by: PSYCHIATRY & NEUROLOGY

## 2021-04-26 PROCEDURE — 3074F SYST BP LT 130 MM HG: CPT | Performed by: PSYCHIATRY & NEUROLOGY

## 2021-04-26 PROCEDURE — 3078F DIAST BP <80 MM HG: CPT | Performed by: PSYCHIATRY & NEUROLOGY

## 2021-04-26 RX ORDER — LEVETIRACETAM 1000 MG/1
1000 TABLET ORAL 2 TIMES DAILY
Qty: 180 TABLET | Refills: 3 | Status: SHIPPED | OUTPATIENT
Start: 2021-04-26 | End: 2022-05-26

## 2021-04-26 NOTE — PROGRESS NOTES
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  There is chronic spastic left hemiparesis, arm > leg  Seizures are completely controlled on levetiracetam  He had a single seizure in 12/2014, 11 months after his stroke which is the presumed focus  Could consider medication wean in the future, but he is tolerating therapy without side effects and there will always be some degree of increased seizure risk  I recommend continuing therapy for now  He agrees       Bilateral carotid disease is followed by vascular surgery  Most recent study reviewed  He will continue with medical management and surveillance duplex/imaging  Patient Instructions   1  Continue current seizure medications unchanged  2  Let us know if there are seizures or problems with your medication  3  Return in 6 months to see [employer], CRNP  Return in one year to see Dr Marion Kirby  Diagnoses and all orders for this visit:    Epilepsy due to old stroke ([facility] [address] )  -     levETIRAcetam (KEPPRA) 1000 MG tablet; Take 1 tablet (1,000 mg total) by mouth 2 (two) times a day        Subjective    [employer] is returning to the [place] [address] [facility] for follow up  Interval Events:   Seizures since last visit: None    No seizures  Carotid duplex completed  Results reviewed  Follows with vascular surgery  Current AEDs:  Keppra 1000mg bid  Medication side effects: None  Medication adherence: Yes     Event/Seizure semiology:  Left sided arm and leg shaking     Special Features  Status epilepticus: no  Self Injury Seizures: no  Precipitating Factors: None reported     Epilepsy Risk Factors:  Stroke     Prior AEDs:  Levetiracetam     Prior Evaluation:  No EEGs in EMR     CT shows chronic large R MCA stroke (personally reviewed 10/28/2020)     History Reviewed: The following were reviewed and updated as appropriate: allergies, current medications, past family history, past medical history, past social history, past surgical history and problem list   No Family History of seizure or epilepsy  Father does have Parkinson's Disease     Psychiatric History:  Anxiety: Sertraline and Lorazepam  Depression: Sertraline     Social History:   Driving: Yes  Lives Alone: Lives at home with his son  Occupation: on permanent disability    Objective    /70 (BP Location: Right arm, Patient Position: Sitting, Cuff Size: Standard)   Pulse 70   Ht 5' 8" (1 727 m)   Wt 86 6 kg (191 lb)   BMI 29 04 kg/m²      General Exam  No acute distress  Neurologic Exam  Mental Status:  Alert and oriented x 3  Language: normal fluency and comprehension  Cranial Nerves:  VFFTC  EOMI  No dysarthria  Motor:  Left spastic hemiparesis, arm(distal>prox)>leg  Raises left arm to 90 degrees at shoulder, no finger movement  Limited ROM at right elbow, chronic  Coordination: Finger to nose intact on right  DTRs: Hyperreflexic on left  Gait: spastic/hemiparetic gait  ROS:    Review of Systems   Constitutional: Negative  Negative for appetite change and fever  HENT: Negative  Negative for hearing loss, tinnitus, trouble swallowing and voice change  Eyes: Negative  Negative for photophobia and pain  Respiratory: Negative  Negative for shortness of breath  Cardiovascular: Negative  Negative for palpitations  Gastrointestinal: Negative  Negative for nausea and vomiting  Endocrine: Negative  Negative for cold intolerance  Genitourinary: Negative  Negative for dysuria, frequency and urgency  Musculoskeletal: Negative  Negative for myalgias and neck pain  Skin: Negative  Negative for rash  Neurological: Negative  Negative for dizziness, tremors, seizures, syncope, facial asymmetry, speech difficulty, weakness, light-headedness, numbness and headaches  Hematological: Negative  Does not bruise/bleed easily  Psychiatric/Behavioral: Negative  Negative for confusion, hallucinations and sleep disturbance  ROS reviewed and updated as appropriate

## 2021-04-29 ENCOUNTER — TRANSCRIBE ORDERS (OUTPATIENT)
Dept: VASCULAR SURGERY | Facility: CLINIC | Age: 61
End: 2021-04-29

## 2021-04-29 DIAGNOSIS — I65.23 CAROTID STENOSIS, BILATERAL: Primary | ICD-10-CM

## 2021-05-05 DIAGNOSIS — E78.49 OTHER HYPERLIPIDEMIA: ICD-10-CM

## 2021-05-05 RX ORDER — ATORVASTATIN CALCIUM 20 MG/1
TABLET, FILM COATED ORAL
Qty: 90 TABLET | Refills: 2 | Status: SHIPPED | OUTPATIENT
Start: 2021-05-05 | End: 2022-02-08 | Stop reason: SDUPTHER

## 2021-05-17 DIAGNOSIS — F41.1 GENERALIZED ANXIETY DISORDER: ICD-10-CM

## 2021-05-17 RX ORDER — LORAZEPAM 0.5 MG/1
0.5 TABLET ORAL 2 TIMES DAILY
Qty: 30 TABLET | Refills: 0 | Status: SHIPPED | OUTPATIENT
Start: 2021-05-17 | End: 2021-06-21 | Stop reason: SDUPTHER

## 2021-05-19 ENCOUNTER — IMMUNIZATIONS (OUTPATIENT)
Dept: FAMILY MEDICINE CLINIC | Facility: HOSPITAL | Age: 61
End: 2021-05-19

## 2021-05-19 DIAGNOSIS — Z23 ENCOUNTER FOR IMMUNIZATION: Primary | ICD-10-CM

## 2021-05-19 PROCEDURE — 0012A SARS-COV-2 / COVID-19 MRNA VACCINE (MODERNA) 100 MCG: CPT

## 2021-05-19 PROCEDURE — 91301 SARS-COV-2 / COVID-19 MRNA VACCINE (MODERNA) 100 MCG: CPT

## 2021-05-24 DIAGNOSIS — E78.2 MIXED HYPERLIPIDEMIA: ICD-10-CM

## 2021-05-24 RX ORDER — EZETIMIBE 10 MG/1
10 TABLET ORAL DAILY
Qty: 90 TABLET | Refills: 3 | Status: SHIPPED | OUTPATIENT
Start: 2021-05-24 | End: 2021-05-25 | Stop reason: SDUPTHER

## 2021-05-25 DIAGNOSIS — E78.2 MIXED HYPERLIPIDEMIA: ICD-10-CM

## 2021-05-25 DIAGNOSIS — E11.9 DIABETES MELLITUS WITHOUT COMPLICATION (HCC): ICD-10-CM

## 2021-05-25 DIAGNOSIS — F32.A DEPRESSIVE DISORDER: ICD-10-CM

## 2021-05-25 RX ORDER — SERTRALINE HYDROCHLORIDE 100 MG/1
100 TABLET, FILM COATED ORAL DAILY
Qty: 90 TABLET | Refills: 1 | Status: SHIPPED | OUTPATIENT
Start: 2021-05-25 | End: 2021-09-28

## 2021-05-25 RX ORDER — EZETIMIBE 10 MG/1
10 TABLET ORAL DAILY
Qty: 90 TABLET | Refills: 3 | Status: SHIPPED | OUTPATIENT
Start: 2021-05-25 | End: 2022-02-08 | Stop reason: SDUPTHER

## 2021-05-25 NOTE — TELEPHONE ENCOUNTER
Patient requesting refills of metformin 1000mg, ezetimibe 10mg, and sertraline 100mg to [facility]

## 2021-06-21 DIAGNOSIS — F41.1 GENERALIZED ANXIETY DISORDER: ICD-10-CM

## 2021-06-21 RX ORDER — LORAZEPAM 0.5 MG/1
0.5 TABLET ORAL 2 TIMES DAILY
Qty: 30 TABLET | Refills: 0 | Status: SHIPPED | OUTPATIENT
Start: 2021-06-21 | End: 2022-02-18 | Stop reason: ALTCHOICE

## 2021-06-22 ENCOUNTER — TELEPHONE (OUTPATIENT)
Dept: ADMINISTRATIVE | Facility: OTHER | Age: 61
End: 2021-06-22

## 2021-06-22 NOTE — LETTER
Procedure Request Form: Colonoscopy      Date Requested: 21  Patient: Morrie Music  Patient : 1960   Referring Provider: Jazzya Simper, MD        Date of Procedure ______________________________       The above patient has informed us that they have completed their   most recent Colonoscopy at your facility  Please complete   this form and attach all corresponding procedure reports/results  Comments __________________________________________________________  ____________________________________________________________________  ____________________________________________________________________  ____________________________________________________________________    Facility Completing Procedure _________________________________________    Form Completed By (print name) _______________________________________      Signature __________________________________________________________      These reports are needed for  compliance    Please fax this completed form and a copy of the procedure report to our office located at [place] [address] as soon as possible to [phone] michelle Steele: Phone [phone]    We thank you for your assistance in treating our mutual patient

## 2021-06-22 NOTE — TELEPHONE ENCOUNTER
Upon review of the In Basket request we have found this is a duplicate request and no further action is needed  This request was completed upon initial request, the patient chart is up to date, and this message will now be closed  Per the office, last colonoscopy was 2016 which is already in patient's chart  Thank you! Any additional questions or concerns should be emailed to the Practice Liaisons via [email or web address] email, please do not reply via In Basket      Thank you  Simran Randolph

## 2021-06-22 NOTE — TELEPHONE ENCOUNTER
----- Message from Darlyn Montero sent at 6/22/2021  8:00 AM EDT -----  Regarding: Care Gap Request  06/22/21 8:00 AM    Hello, our patient Yajaira Montes has had CRC: Colonoscopy completed/performed  Please assist in updating the patient chart by pulling the Care Everywhere (CE) document  The date of service is 05/19/2021       Thank you,  Carla Flores MA  [facility] [place]

## 2021-06-24 ENCOUNTER — TELEPHONE (OUTPATIENT)
Dept: NEUROLOGY | Facility: CLINIC | Age: 61
End: 2021-06-24

## 2021-06-24 NOTE — TELEPHONE ENCOUNTER
Patient calling to see if melatonin would interact with his Keppra  Reviewed micromedix with him and advised that there were no interactions between the 2 medications

## 2021-07-29 DIAGNOSIS — I10 ESSENTIAL (PRIMARY) HYPERTENSION: ICD-10-CM

## 2021-07-29 RX ORDER — LOSARTAN POTASSIUM 100 MG/1
TABLET ORAL
Qty: 50 TABLET | Refills: 3 | Status: SHIPPED | OUTPATIENT
Start: 2021-07-29 | End: 2022-02-18

## 2021-07-31 ENCOUNTER — APPOINTMENT (OUTPATIENT)
Dept: LAB | Facility: HOSPITAL | Age: 61
End: 2021-07-31
Payer: COMMERCIAL

## 2021-07-31 DIAGNOSIS — E11.59 TYPE 2 DIABETES MELLITUS WITH OTHER CIRCULATORY COMPLICATION, WITHOUT LONG-TERM CURRENT USE OF INSULIN (HCC): ICD-10-CM

## 2021-07-31 DIAGNOSIS — E78.2 MIXED HYPERLIPIDEMIA: ICD-10-CM

## 2021-07-31 DIAGNOSIS — I10 BENIGN ESSENTIAL HYPERTENSION: ICD-10-CM

## 2021-07-31 LAB
ALBUMIN SERPL BCP-MCNC: 3.8 G/DL (ref 3.5–5)
ALP SERPL-CCNC: 62 U/L (ref 46–116)
ALT SERPL W P-5'-P-CCNC: 55 U/L (ref 12–78)
ANION GAP SERPL CALCULATED.3IONS-SCNC: 8 MMOL/L (ref 4–13)
AST SERPL W P-5'-P-CCNC: 23 U/L (ref 5–45)
BILIRUB SERPL-MCNC: 0.41 MG/DL (ref 0.2–1)
BUN SERPL-MCNC: 9 MG/DL (ref 5–25)
CALCIUM SERPL-MCNC: 9.4 MG/DL (ref 8.3–10.1)
CHLORIDE SERPL-SCNC: 105 MMOL/L (ref 100–108)
CHOLEST SERPL-MCNC: 128 MG/DL (ref 50–200)
CO2 SERPL-SCNC: 28 MMOL/L (ref 21–32)
CREAT SERPL-MCNC: 0.53 MG/DL (ref 0.6–1.3)
EST. AVERAGE GLUCOSE BLD GHB EST-MCNC: 126 MG/DL
GFR SERPL CREATININE-BSD FRML MDRD: 116 ML/MIN/1.73SQ M
GLUCOSE P FAST SERPL-MCNC: 126 MG/DL (ref 65–99)
HBA1C MFR BLD: 6 %
HDLC SERPL-MCNC: 39 MG/DL
LDLC SERPL CALC-MCNC: 60 MG/DL (ref 0–100)
POTASSIUM SERPL-SCNC: 3.9 MMOL/L (ref 3.5–5.3)
PROT SERPL-MCNC: 7.3 G/DL (ref 6.4–8.2)
SODIUM SERPL-SCNC: 141 MMOL/L (ref 136–145)
TRIGL SERPL-MCNC: 147 MG/DL
TSH SERPL DL<=0.05 MIU/L-ACNC: 3.08 UIU/ML (ref 0.36–3.74)

## 2021-07-31 PROCEDURE — 83036 HEMOGLOBIN GLYCOSYLATED A1C: CPT

## 2021-07-31 PROCEDURE — 36415 COLL VENOUS BLD VENIPUNCTURE: CPT

## 2021-07-31 PROCEDURE — 80053 COMPREHEN METABOLIC PANEL: CPT

## 2021-07-31 PROCEDURE — 80061 LIPID PANEL: CPT

## 2021-07-31 PROCEDURE — 84443 ASSAY THYROID STIM HORMONE: CPT

## 2021-08-18 ENCOUNTER — OFFICE VISIT (OUTPATIENT)
Dept: FAMILY MEDICINE CLINIC | Facility: CLINIC | Age: 61
End: 2021-08-18
Payer: COMMERCIAL

## 2021-08-18 VITALS
TEMPERATURE: 98 F | HEART RATE: 81 BPM | HEIGHT: 68 IN | SYSTOLIC BLOOD PRESSURE: 108 MMHG | WEIGHT: 191.8 LBS | DIASTOLIC BLOOD PRESSURE: 64 MMHG | OXYGEN SATURATION: 97 % | BODY MASS INDEX: 29.07 KG/M2 | RESPIRATION RATE: 18 BRPM

## 2021-08-18 DIAGNOSIS — E11.59 TYPE 2 DIABETES MELLITUS WITH OTHER CIRCULATORY COMPLICATION, WITHOUT LONG-TERM CURRENT USE OF INSULIN (HCC): ICD-10-CM

## 2021-08-18 DIAGNOSIS — I65.23 CAROTID STENOSIS, BILATERAL: ICD-10-CM

## 2021-08-18 DIAGNOSIS — I69.354 HEMIPARESIS AFFECTING LEFT SIDE AS LATE EFFECT OF CEREBROVASCULAR ACCIDENT (HCC): ICD-10-CM

## 2021-08-18 DIAGNOSIS — G40.909 EPILEPSY DUE TO OLD STROKE (HCC): ICD-10-CM

## 2021-08-18 DIAGNOSIS — I10 BENIGN ESSENTIAL HYPERTENSION: Primary | ICD-10-CM

## 2021-08-18 DIAGNOSIS — I77.1 STRICTURE OF ARTERY (HCC): ICD-10-CM

## 2021-08-18 DIAGNOSIS — I69.398 EPILEPSY DUE TO OLD STROKE (HCC): ICD-10-CM

## 2021-08-18 DIAGNOSIS — E78.2 MIXED HYPERLIPIDEMIA: ICD-10-CM

## 2021-08-18 DIAGNOSIS — F32.4 MAJOR DEPRESSIVE DISORDER WITH SINGLE EPISODE, IN PARTIAL REMISSION (HCC): ICD-10-CM

## 2021-08-18 DIAGNOSIS — K63.5 HYPERPLASTIC COLONIC POLYP, UNSPECIFIED PART OF COLON: ICD-10-CM

## 2021-08-18 PROCEDURE — 3008F BODY MASS INDEX DOCD: CPT | Performed by: FAMILY MEDICINE

## 2021-08-18 PROCEDURE — 99214 OFFICE O/P EST MOD 30 MIN: CPT | Performed by: FAMILY MEDICINE

## 2021-08-18 PROCEDURE — 3074F SYST BP LT 130 MM HG: CPT | Performed by: FAMILY MEDICINE

## 2021-08-18 PROCEDURE — 3078F DIAST BP <80 MM HG: CPT | Performed by: FAMILY MEDICINE

## 2021-08-18 RX ORDER — LANCETS
EACH MISCELLANEOUS DAILY
Qty: 90 EACH | Refills: 1 | Status: SHIPPED | OUTPATIENT
Start: 2021-08-18

## 2021-08-18 NOTE — ASSESSMENT & PLAN NOTE
Continues to smoke 2-4 small cigars  He uses nicorette losanges when he drives which helps  Discussed with patient on the importance of smoking cessation especially with hx of CVA

## 2021-08-18 NOTE — ASSESSMENT & PLAN NOTE
Stable   goal bp <130/80, pt's bp is at goal   encouraged medication compliance   Advised patient on symptoms of hypotension & severe HTN   Limit salt-intake & caffeine in diet, minimize stress level   Weight reduction efforts via improved diet & increased exercise recommend 150 minutes a week   DASH diet handout given via AVS   last urine microalbumin less than 1 year ago   Discussed importance of treating HTN to prevent ASCVD, CVA

## 2021-08-18 NOTE — ASSESSMENT & PLAN NOTE
Stable  · Patient states his mood is good  · Patient denies depressive symptoms  Wakes up feeling energized and continues to enjoy a hobbies a laxative    · Continue sertraline 100mg daily

## 2021-08-18 NOTE — ASSESSMENT & PLAN NOTE
Stable  · Remains seizure free on current regimen of keppra 1000mg BID  · Follows regularly with Neuro, Dr Kassandra Bowden

## 2021-08-18 NOTE — ASSESSMENT & PLAN NOTE
Stable  · Follows with neurology Dr Chen Hence  · Follows with Vascular Dr Leonel Kelly - repeat doppler 10/2021

## 2021-08-18 NOTE — ASSESSMENT & PLAN NOTE
Stable  · Lipid panel 7/31/2021: WNL  · Continue Zetia 10mg daily and atorvastatin 20mg daily  · Encouraged walking and exercise  Due to patient's hemiperesis it is difficult to do strenuous exercise

## 2021-08-18 NOTE — PROGRESS NOTES
BMI Counseling: Body mass index is 29 38 kg/m²  The BMI is above normal  Nutrition recommendations include reducing portion sizes, decreasing overall calorie intake, 3-5 servings of fruits/vegetables daily, reducing fast food intake, consuming healthier snacks, decreasing soda and/or juice intake, moderation in carbohydrate intake, increasing intake of lean protein, reducing intake of saturated fat and trans fat and reducing intake of cholesterol  Exercise recommendations include moderate aerobic physical activity for 150 minutes/week and exercising 3-5 times per week  Assessment/Plan:    Major depressive disorder with single episode, in partial remission ([address] )  Stable  · Patient states his mood is good  · Patient denies depressive symptoms  Wakes up feeling energized and continues to enjoy a hobbies a laxative    · Continue sertraline 100mg daily    Type 2 diabetes mellitus with circulatory disorder, without long-term current use of insulin ([facility])    Lab Results   Component Value Date    HGBA1C 6 0 (H) 07/31/2021     stable  Last a1c:   Lab Results   Component Value Date    HGBA1C 6 0 (H) 07/31/2021       Goal A1C <7   Reconciled home medication regimen   Last urine microalbumin: 8   Discussed importance of strict carb control diet and weight reduction   Up to date with diabetic eye exam 10/2020   Follow up in 6 months        Nicotine dependence with nicotine-induced disorder  Continues to smoke 2-4 small cigars  He uses [employer] when he drives which helps  Discussed with patient on the importance of smoking cessation especially with hx of CVA    Carotid stenosis, bilateral  Stable  · Follows with neurology Dr Heather Stark  · [address] with Vascular Dr Charles Haskins - repeat doppler 10/2021    Benign essential hypertension  Stable   goal bp <130/80, pt's bp is at goal   encouraged medication compliance   Advised patient on symptoms of hypotension & severe HTN   Limit salt-intake & caffeine in diet, minimize stress level   Weight reduction efforts via improved diet & increased exercise recommend 150 minutes a week   DASH diet handout given via AVS   last urine microalbumin less than 1 year ago   Discussed importance of treating HTN to prevent ASCVD, CVA        Hemiparesis affecting left side as late effect of cerebrovascular accident ([facility] [address] )  Stable  · Follows with Dr Emily Griffin 4/2021    Epilepsy due to old stroke ([facility] [address] )  Stable  · Remains seizure free on current regimen of keppra 1000mg BID  · Follows regularly with Neuro, Dr Emily Griffin    Mixed hyperlipidemia  Stable  · Lipid panel 7/31/2021: WNL  · Continue Zetia 10mg daily and atorvastatin 20mg daily  · Encouraged walking and exercise  Due to patient's hemiperesis it is difficult to do strenuous exercise           Problem List Items Addressed This Visit        Digestive    Hyperplastic colonic polyp    Relevant Orders    Ambulatory referral for colonoscopy       Endocrine    Type 2 diabetes mellitus with circulatory disorder, without long-term current use of insulin ([facility])       Lab Results   Component Value Date    HGBA1C 6 0 (H) 07/31/2021     stable  Last a1c:   Lab Results   Component Value Date    HGBA1C 6 0 (H) 07/31/2021       Goal A1C <7   Reconciled home medication regimen   Last urine microalbumin: 8   Discussed importance of strict carb control diet and weight reduction   Up to date with diabetic eye exam 10/2020   Follow up in 6 months             Relevant Medications    Lancets (onetouch ultrasoft) lancets    Other Relevant Orders    HEMOGLOBIN A1C W/ EAG ESTIMATION    Comprehensive metabolic panel    Microalbumin / creatinine urine ratio       Cardiovascular and Mediastinum    Carotid stenosis, bilateral     Stable  · Follows with neurology Dr Emily Griffin  · Follows with Vascular Dr Henry Teran - repeat doppler 10/2021         Benign essential hypertension - Primary     Stable   goal bp <130/80, pt's bp is at goal   encouraged medication compliance   Advised patient on symptoms of hypotension & severe HTN   Limit salt-intake & caffeine in diet, minimize stress level   Weight reduction efforts via improved diet & increased exercise recommend 150 minutes a week   DASH diet handout given via AVS   last urine microalbumin less than 1 year ago   Discussed importance of treating HTN to prevent ASCVD, CVA             Relevant Orders    Comprehensive metabolic panel    Microalbumin / creatinine urine ratio    Stricture of artery (HCC)       Nervous and Auditory    Hemiparesis affecting left side as late effect of cerebrovascular accident ([facility] [address] )     Stable  · Follows with Dr Tana Richmond 4/2021         Epilepsy due to old stroke ([facility] [address] )     Stable  · Remains seizure free on current regimen of keppra 1000mg BID  · Follows regularly with Neuro, Dr Tana Richmond            Other    Mixed hyperlipidemia     Stable  · Lipid panel 7/31/2021: WNL  · Continue Zetia 10mg daily and atorvastatin 20mg daily  · Encouraged walking and exercise  Due to patient's hemiperesis it is difficult to do strenuous exercise  Relevant Orders    Lipid panel    Major depressive disorder with single episode, in partial remission (HCC)     Stable  · Patient states his mood is good  · Patient denies depressive symptoms  Wakes up feeling energized and continues to enjoy a hobbies a laxative  · Continue sertraline 100mg daily           Other Visit Diagnoses     BMI 29 0-29 9,adult        Relevant Orders    Lipid panel            Subjective:      Patient ID: Madhuri Helms is a 63 y o  male presents today for routine follow up  He is doing well and has no complaints  He would like to go over his blood work  Georgette Ormond He is also due for colonoscopy           The following portions of the patient's history were reviewed and updated as appropriate: allergies, current medications, past family history, past medical history, past social history, past surgical history and problem list     Review of Systems   Constitutional: Negative for activity change, chills, diaphoresis, fatigue and fever  HENT: Negative for congestion, ear pain, hearing loss, postnasal drip, rhinorrhea, sinus pressure, sinus pain, sneezing and sore throat  Eyes: Negative for visual disturbance  Respiratory: Negative for cough, chest tightness, shortness of breath and wheezing  Cardiovascular: Negative for chest pain, palpitations and leg swelling  Gastrointestinal: Negative for abdominal pain, blood in stool, constipation, diarrhea, nausea and vomiting  Genitourinary: Negative for difficulty urinating, dysuria, flank pain, frequency, hematuria and urgency  Musculoskeletal: Negative for arthralgias, back pain, myalgias and neck pain  Neurological: Negative for dizziness, syncope, weakness, light-headedness, numbness and headaches  Objective:      /64 (BP Location: Right arm, Patient Position: Sitting, Cuff Size: Standard)   Pulse 81   Temp 98 °F (36 7 °C) (Tympanic)   Resp 18   Ht 5' 7 75" (1 721 m)   Wt 87 kg (191 lb 12 8 oz)   SpO2 97%   BMI 29 38 kg/m²          Physical Exam  Vitals reviewed  Constitutional:       General: He is not in acute distress  Appearance: He is well-developed  He is not diaphoretic  HENT:      Head: Normocephalic and atraumatic  Right Ear: External ear normal       Left Ear: External ear normal       Nose: No congestion or rhinorrhea  Mouth/Throat:      Mouth: Mucous membranes are moist       Pharynx: Oropharynx is clear  No oropharyngeal exudate  Eyes:      General: No scleral icterus  Pupils: Pupils are equal, round, and reactive to light  Neck:      Thyroid: No thyromegaly  Vascular: No JVD  Trachea: No tracheal deviation  Cardiovascular:      Rate and Rhythm: Normal rate and regular rhythm  Heart sounds: Normal heart sounds  No murmur heard  No friction rub     Pulmonary:      Effort: Pulmonary effort is normal  No respiratory distress  Breath sounds: Normal breath sounds  No wheezing or rales  Chest:      Chest wall: No tenderness  Abdominal:      General: Bowel sounds are normal  There is no distension  Palpations: Abdomen is soft  Tenderness: There is no abdominal tenderness  There is no guarding or rebound  Musculoskeletal:         General: No tenderness  Normal range of motion  Cervical back: Normal range of motion and neck supple  Skin:     General: Skin is warm and dry  Neurological:      Mental Status: He is alert and oriented to person, place, and time  Mental status is at baseline  Deep Tendon Reflexes: Reflexes are normal and symmetric  Psychiatric:         Mood and Affect: Mood normal          Behavior: Behavior normal          Thought Content:  Thought content normal          Judgment: Judgment normal

## 2021-08-18 NOTE — ASSESSMENT & PLAN NOTE
Lab Results   Component Value Date    HGBA1C 6 0 (H) 07/31/2021     stable  Last a1c:   Lab Results   Component Value Date    HGBA1C 6 0 (H) 07/31/2021       Goal A1C <7   Reconciled home medication regimen   Last urine microalbumin: 8   Discussed importance of strict carb control diet and weight reduction   Up to date with diabetic eye exam 10/2020   Recommend testing fasting blood sugar daily or if patient feels hypo or hyperglycemic   New script for glucometer, test strips, and lancets sent to pharmacy   Follow up in 6 months

## 2021-08-18 NOTE — PROGRESS NOTES
BMI Counseling: Body mass index is 29 38 kg/m²  The BMI is above normal  Nutrition recommendations include reducing portion sizes, decreasing overall calorie intake, 3-5 servings of fruits/vegetables daily, reducing fast food intake, consuming healthier snacks, decreasing soda and/or juice intake, moderation in carbohydrate intake, increasing intake of lean protein, reducing intake of saturated fat and trans fat and reducing intake of cholesterol  Exercise recommendations include moderate aerobic physical activity for 150 minutes/week, exercising 3-5 times per week, joining a gym and strength training exercises

## 2021-09-21 ENCOUNTER — TELEPHONE (OUTPATIENT)
Dept: VASCULAR SURGERY | Facility: CLINIC | Age: 61
End: 2021-09-21

## 2021-09-28 DIAGNOSIS — I10 BENIGN ESSENTIAL HYPERTENSION: ICD-10-CM

## 2021-09-28 DIAGNOSIS — F32.A DEPRESSIVE DISORDER: ICD-10-CM

## 2021-09-28 PROCEDURE — 4010F ACE/ARB THERAPY RXD/TAKEN: CPT | Performed by: FAMILY MEDICINE

## 2021-09-28 RX ORDER — LOSARTAN POTASSIUM 50 MG/1
50 TABLET ORAL DAILY
Qty: 90 TABLET | Refills: 3 | Status: SHIPPED | OUTPATIENT
Start: 2021-09-28

## 2021-09-28 RX ORDER — SERTRALINE HYDROCHLORIDE 100 MG/1
TABLET, FILM COATED ORAL
Qty: 90 TABLET | Refills: 1 | Status: SHIPPED | OUTPATIENT
Start: 2021-09-28 | End: 2021-09-28 | Stop reason: SDUPTHER

## 2021-09-28 RX ORDER — SERTRALINE HYDROCHLORIDE 100 MG/1
100 TABLET, FILM COATED ORAL DAILY
Qty: 90 TABLET | Refills: 1 | Status: SHIPPED | OUTPATIENT
Start: 2021-09-28 | End: 2022-05-12

## 2021-09-29 ENCOUNTER — TELEPHONE (OUTPATIENT)
Dept: FAMILY MEDICINE CLINIC | Facility: CLINIC | Age: 61
End: 2021-09-29

## 2021-10-18 ENCOUNTER — HOSPITAL ENCOUNTER (OUTPATIENT)
Dept: NON INVASIVE DIAGNOSTICS | Facility: CLINIC | Age: 61
Discharge: HOME | End: 2021-10-18
Payer: COMMERCIAL

## 2021-10-18 DIAGNOSIS — I65.23 CAROTID STENOSIS, BILATERAL: ICD-10-CM

## 2021-10-18 PROCEDURE — 93880 EXTRACRANIAL BILAT STUDY: CPT

## 2021-10-18 PROCEDURE — 93880 EXTRACRANIAL BILAT STUDY: CPT | Performed by: SURGERY

## 2021-11-19 ENCOUNTER — TELEPHONE (OUTPATIENT)
Dept: FAMILY MEDICINE CLINIC | Facility: CLINIC | Age: 61
End: 2021-11-19

## 2021-11-24 ENCOUNTER — OFFICE VISIT (OUTPATIENT)
Dept: VASCULAR SURGERY | Facility: CLINIC | Age: 61
End: 2021-11-24
Payer: COMMERCIAL

## 2021-11-24 VITALS
BODY MASS INDEX: 28.95 KG/M2 | WEIGHT: 191 LBS | DIASTOLIC BLOOD PRESSURE: 70 MMHG | SYSTOLIC BLOOD PRESSURE: 118 MMHG | HEIGHT: 68 IN | HEART RATE: 65 BPM

## 2021-11-24 DIAGNOSIS — I10 BENIGN ESSENTIAL HYPERTENSION: ICD-10-CM

## 2021-11-24 DIAGNOSIS — I69.30 HISTORY OF CEREBROVASCULAR ACCIDENT WITH RESIDUAL DEFICIT: ICD-10-CM

## 2021-11-24 DIAGNOSIS — I65.23 CAROTID STENOSIS, BILATERAL: Primary | ICD-10-CM

## 2021-11-24 DIAGNOSIS — E78.2 MIXED HYPERLIPIDEMIA: ICD-10-CM

## 2021-11-24 DIAGNOSIS — F17.299 OTHER TOBACCO PRODUCT NICOTINE DEPENDENCE WITH NICOTINE-INDUCED DISORDER: ICD-10-CM

## 2021-11-24 PROCEDURE — 3074F SYST BP LT 130 MM HG: CPT | Performed by: PHYSICIAN ASSISTANT

## 2021-11-24 PROCEDURE — 3008F BODY MASS INDEX DOCD: CPT | Performed by: PHYSICIAN ASSISTANT

## 2021-11-24 PROCEDURE — 3078F DIAST BP <80 MM HG: CPT | Performed by: PHYSICIAN ASSISTANT

## 2021-11-24 PROCEDURE — 99214 OFFICE O/P EST MOD 30 MIN: CPT | Performed by: PHYSICIAN ASSISTANT

## 2021-12-08 LAB
LEFT EYE DIABETIC RETINOPATHY: NORMAL
RIGHT EYE DIABETIC RETINOPATHY: NORMAL

## 2022-01-05 ENCOUNTER — IMMUNIZATIONS (OUTPATIENT)
Dept: FAMILY MEDICINE CLINIC | Facility: HOSPITAL | Age: 62
End: 2022-01-05

## 2022-01-05 DIAGNOSIS — Z23 ENCOUNTER FOR IMMUNIZATION: Primary | ICD-10-CM

## 2022-01-05 PROCEDURE — 0064A COVID-19 MODERNA VACC 0.25 ML BOOSTER: CPT

## 2022-01-05 PROCEDURE — 91306 COVID-19 MODERNA VACC 0.25 ML BOOSTER: CPT

## 2022-01-28 ENCOUNTER — TELEPHONE (OUTPATIENT)
Dept: NEUROLOGY | Facility: CLINIC | Age: 62
End: 2022-01-28

## 2022-02-08 DIAGNOSIS — E78.2 MIXED HYPERLIPIDEMIA: ICD-10-CM

## 2022-02-08 DIAGNOSIS — E78.49 OTHER HYPERLIPIDEMIA: ICD-10-CM

## 2022-02-08 RX ORDER — EZETIMIBE 10 MG/1
10 TABLET ORAL DAILY
Qty: 90 TABLET | Refills: 3 | Status: SHIPPED | OUTPATIENT
Start: 2022-02-08

## 2022-02-08 RX ORDER — ATORVASTATIN CALCIUM 20 MG/1
20 TABLET, FILM COATED ORAL DAILY
Qty: 90 TABLET | Refills: 2 | Status: SHIPPED | OUTPATIENT
Start: 2022-02-08

## 2022-02-08 NOTE — TELEPHONE ENCOUNTER
patient presents at window for a refill     atorvastatin (LIPITOR) 20 mg tablet     ezetimibe (ZETIA) 10 mg tablet       States he has been using the ezetimbe in place of the atorvastatin since he has been out      [facility]

## 2022-02-09 DIAGNOSIS — E11.9 DIABETES MELLITUS WITHOUT COMPLICATION (HCC): ICD-10-CM

## 2022-02-11 ENCOUNTER — APPOINTMENT (OUTPATIENT)
Dept: LAB | Facility: HOSPITAL | Age: 62
End: 2022-02-11
Attending: FAMILY MEDICINE
Payer: COMMERCIAL

## 2022-02-11 DIAGNOSIS — I10 BENIGN ESSENTIAL HYPERTENSION: ICD-10-CM

## 2022-02-11 DIAGNOSIS — E11.59 TYPE 2 DIABETES MELLITUS WITH OTHER CIRCULATORY COMPLICATION, WITHOUT LONG-TERM CURRENT USE OF INSULIN (HCC): ICD-10-CM

## 2022-02-11 DIAGNOSIS — E78.2 MIXED HYPERLIPIDEMIA: ICD-10-CM

## 2022-02-11 LAB
ALBUMIN SERPL BCP-MCNC: 3.4 G/DL (ref 3.5–5)
ALP SERPL-CCNC: 65 U/L (ref 46–116)
ALT SERPL W P-5'-P-CCNC: 29 U/L (ref 12–78)
ANION GAP SERPL CALCULATED.3IONS-SCNC: 6 MMOL/L (ref 4–13)
AST SERPL W P-5'-P-CCNC: 13 U/L (ref 5–45)
BILIRUB SERPL-MCNC: 0.43 MG/DL (ref 0.2–1)
BUN SERPL-MCNC: 12 MG/DL (ref 5–25)
CALCIUM ALBUM COR SERPL-MCNC: 9.9 MG/DL (ref 8.3–10.1)
CALCIUM SERPL-MCNC: 9.4 MG/DL (ref 8.3–10.1)
CHLORIDE SERPL-SCNC: 104 MMOL/L (ref 100–108)
CHOLEST SERPL-MCNC: 152 MG/DL
CO2 SERPL-SCNC: 29 MMOL/L (ref 21–32)
CREAT SERPL-MCNC: 0.59 MG/DL (ref 0.6–1.3)
CREAT UR-MCNC: 83.9 MG/DL
EST. AVERAGE GLUCOSE BLD GHB EST-MCNC: 114 MG/DL
GFR SERPL CREATININE-BSD FRML MDRD: 110 ML/MIN/1.73SQ M
GLUCOSE P FAST SERPL-MCNC: 132 MG/DL (ref 65–99)
HBA1C MFR BLD: 5.6 %
HDLC SERPL-MCNC: 40 MG/DL
LDLC SERPL CALC-MCNC: 85 MG/DL (ref 0–100)
MICROALBUMIN UR-MCNC: 6 MG/L (ref 0–20)
MICROALBUMIN/CREAT 24H UR: 7 MG/G CREATININE (ref 0–30)
NONHDLC SERPL-MCNC: 112 MG/DL
POTASSIUM SERPL-SCNC: 4.6 MMOL/L (ref 3.5–5.3)
PROT SERPL-MCNC: 6.8 G/DL (ref 6.4–8.2)
SODIUM SERPL-SCNC: 139 MMOL/L (ref 136–145)
TRIGL SERPL-MCNC: 137 MG/DL

## 2022-02-11 PROCEDURE — 82570 ASSAY OF URINE CREATININE: CPT

## 2022-02-11 PROCEDURE — 3061F NEG MICROALBUMINURIA REV: CPT | Performed by: FAMILY MEDICINE

## 2022-02-11 PROCEDURE — 3044F HG A1C LEVEL LT 7.0%: CPT | Performed by: FAMILY MEDICINE

## 2022-02-11 PROCEDURE — 80053 COMPREHEN METABOLIC PANEL: CPT

## 2022-02-11 PROCEDURE — 80061 LIPID PANEL: CPT

## 2022-02-11 PROCEDURE — 83036 HEMOGLOBIN GLYCOSYLATED A1C: CPT

## 2022-02-11 PROCEDURE — 82043 UR ALBUMIN QUANTITATIVE: CPT

## 2022-02-11 PROCEDURE — 36415 COLL VENOUS BLD VENIPUNCTURE: CPT

## 2022-02-15 ENCOUNTER — RA CDI HCC (OUTPATIENT)
Dept: OTHER | Facility: HOSPITAL | Age: 62
End: 2022-02-15

## 2022-02-18 ENCOUNTER — OFFICE VISIT (OUTPATIENT)
Dept: FAMILY MEDICINE CLINIC | Facility: CLINIC | Age: 62
End: 2022-02-18
Payer: COMMERCIAL

## 2022-02-18 VITALS
WEIGHT: 185 LBS | SYSTOLIC BLOOD PRESSURE: 122 MMHG | RESPIRATION RATE: 18 BRPM | HEART RATE: 98 BPM | OXYGEN SATURATION: 97 % | HEIGHT: 69 IN | TEMPERATURE: 97.7 F | BODY MASS INDEX: 27.4 KG/M2 | DIASTOLIC BLOOD PRESSURE: 80 MMHG

## 2022-02-18 DIAGNOSIS — I65.23 CAROTID STENOSIS, BILATERAL: ICD-10-CM

## 2022-02-18 DIAGNOSIS — Z00.00 MEDICARE ANNUAL WELLNESS VISIT, SUBSEQUENT: Primary | ICD-10-CM

## 2022-02-18 DIAGNOSIS — E11.59 TYPE 2 DIABETES MELLITUS WITH OTHER CIRCULATORY COMPLICATION, WITHOUT LONG-TERM CURRENT USE OF INSULIN (HCC): ICD-10-CM

## 2022-02-18 DIAGNOSIS — E78.2 MIXED HYPERLIPIDEMIA: ICD-10-CM

## 2022-02-18 DIAGNOSIS — G40.909 EPILEPSY DUE TO OLD STROKE (HCC): ICD-10-CM

## 2022-02-18 DIAGNOSIS — I69.354 HEMIPARESIS AFFECTING LEFT SIDE AS LATE EFFECT OF CEREBROVASCULAR ACCIDENT (HCC): ICD-10-CM

## 2022-02-18 DIAGNOSIS — Z23 NEED FOR VACCINATION: ICD-10-CM

## 2022-02-18 DIAGNOSIS — I69.398 EPILEPSY DUE TO OLD STROKE (HCC): ICD-10-CM

## 2022-02-18 DIAGNOSIS — K63.5 HYPERPLASTIC COLONIC POLYP, UNSPECIFIED PART OF COLON: ICD-10-CM

## 2022-02-18 DIAGNOSIS — I10 BENIGN ESSENTIAL HYPERTENSION: ICD-10-CM

## 2022-02-18 DIAGNOSIS — F41.1 GENERALIZED ANXIETY DISORDER: ICD-10-CM

## 2022-02-18 DIAGNOSIS — N40.0 BENIGN PROSTATIC HYPERPLASIA WITHOUT LOWER URINARY TRACT SYMPTOMS: ICD-10-CM

## 2022-02-18 DIAGNOSIS — F32.4 MAJOR DEPRESSIVE DISORDER WITH SINGLE EPISODE, IN PARTIAL REMISSION (HCC): ICD-10-CM

## 2022-02-18 PROCEDURE — 3008F BODY MASS INDEX DOCD: CPT | Performed by: FAMILY MEDICINE

## 2022-02-18 PROCEDURE — 99214 OFFICE O/P EST MOD 30 MIN: CPT | Performed by: FAMILY MEDICINE

## 2022-02-18 PROCEDURE — 3079F DIAST BP 80-89 MM HG: CPT | Performed by: FAMILY MEDICINE

## 2022-02-18 PROCEDURE — G0008 ADMIN INFLUENZA VIRUS VAC: HCPCS

## 2022-02-18 PROCEDURE — G0439 PPPS, SUBSEQ VISIT: HCPCS | Performed by: FAMILY MEDICINE

## 2022-02-18 PROCEDURE — 3074F SYST BP LT 130 MM HG: CPT | Performed by: FAMILY MEDICINE

## 2022-02-18 PROCEDURE — 90682 RIV4 VACC RECOMBINANT DNA IM: CPT

## 2022-02-18 PROCEDURE — 3725F SCREEN DEPRESSION PERFORMED: CPT | Performed by: FAMILY MEDICINE

## 2022-02-18 RX ORDER — BLOOD SUGAR DIAGNOSTIC
1 STRIP MISCELLANEOUS DAILY
Qty: 100 STRIP | Refills: 3 | Status: SHIPPED | OUTPATIENT
Start: 2022-02-18

## 2022-02-18 NOTE — ASSESSMENT & PLAN NOTE
·  Patient remained seizure-free  ·  continue Keppra 1000 mg b i d   ·  will repeat Keppra level at next visit

## 2022-02-18 NOTE — ASSESSMENT & PLAN NOTE
Stable  ·  previously follows with Neurology, Dr Macy Vo  ·  has not been seen since 04/2021  ·  patient is unsure if he needs further follow-up as symptoms have remains significantly stable

## 2022-02-18 NOTE — PATIENT INSTRUCTIONS
Please call your gastroenterologist to schedule a colonoscopy      Medicare Preventive Visit Patient Instructions  Thank you for completing your Welcome to Medicare Visit or Medicare Annual Wellness Visit today  Your next wellness visit will be due in one year (2/19/2023)  The screening/preventive services that you may require over the next 5-10 years are detailed below  Some tests may not apply to you based off risk factors and/or age  Screening tests ordered at today's visit but not completed yet may show as past due  Also, please note that scanned in results may not display below  Preventive Screenings:  Service Recommendations Previous Testing/Comments   Colorectal Cancer Screening  · Colonoscopy    · Fecal Occult Blood Test (FOBT)/Fecal Immunochemical Test (FIT)  · Fecal DNA/Cologuard Test  · Flexible Sigmoidoscopy Age: 54-65 years old   Colonoscopy: every 10 years (May be performed more frequently if at higher risk)  OR  FOBT/FIT: every 1 year  OR  Cologuard: every 3 years  OR  Sigmoidoscopy: every 5 years  Screening may be recommended earlier than age 48 if at higher risk for colorectal cancer  Also, an individualized decision between you and your healthcare provider will decide whether screening between the ages of 74-80 would be appropriate   Colonoscopy: 10/27/2016  FOBT/FIT: Not on file  Cologuard: Not on file  Sigmoidoscopy: Not on file    Screening Current     Prostate Cancer Screening Individualized decision between patient and health care provider in men between ages of 53-78   Medicare will cover every 12 months beginning on the day after your 50th birthday PSA: 1 5 ng/mL     Screening Current     Hepatitis C Screening Once for adults born between 1945 and 1965  More frequently in patients at high risk for Hepatitis C Hep C Antibody: 11/26/2018    Screening Current   Diabetes Screening 1-2 times per year if you're at risk for diabetes or have pre-diabetes Fasting glucose: 132 mg/dL   A1C: 5 6 %    History Diabetes  Screening Current   Cholesterol Screening Once every 5 years if you don't have a lipid disorder  May order more often based on risk factors  Lipid panel: 02/11/2022    History Lipid Disorder  Screening Current      Other Preventive Screenings Covered by Medicare:  1  Abdominal Aortic Aneurysm (AAA) Screening: covered once if your at risk  You're considered to be at risk if you have a family history of AAA or a male between the age of 73-68 who smoking at least 100 cigarettes in your lifetime  2  Lung Cancer Screening: covers low dose CT scan once per year if you meet all of the following conditions: (1) Age 50-69; (2) No signs or symptoms of lung cancer; (3) Current smoker or have quit smoking within the last 15 years; (4) You have a tobacco smoking history of at least 30 pack years (packs per day x number of years you smoked); (5) You get a written order from a healthcare provider  3  Glaucoma Screening: covered annually if you're considered high risk: (1) You have diabetes OR (2) Family history of glaucoma OR (3)  aged 48 and older OR (3)  American aged 72 and older  3  Osteoporosis Screening: covered every 2 years if you meet one of the following conditions: (1) Have a vertebral abnormality; (2) On glucocorticoid therapy for more than 3 months; (3) Have primary hyperparathyroidism; (4) On osteoporosis medications and need to assess response to drug therapy  5  HIV Screening: covered annually if you're between the age of 12-76  Also covered annually if you are younger than 13 and older than 72 with risk factors for HIV infection  For pregnant patients, it is covered up to 3 times per pregnancy      Immunizations:  Immunization Recommendations   Influenza Vaccine Annual influenza vaccination during flu season is recommended for all persons aged >= 6 months who do not have contraindications   Pneumococcal Vaccine (Prevnar and Pneumovax)  * Prevnar = PCV13  * Pneumovax = PPSV23 Adults 25-60 years old: 1-3 doses may be recommended based on certain risk factors  Adults 72 years old: Prevnar (PCV13) vaccine recommended followed by Pneumovax (PPSV23) vaccine  If already received PPSV23 since turning 65, then PCV13 recommended at least one year after PPSV23 dose  Hepatitis B Vaccine 3 dose series if at intermediate or high risk (ex: diabetes, end stage renal disease, liver disease)   Tetanus (Td) Vaccine - COST NOT COVERED BY MEDICARE PART B Following completion of primary series, a booster dose should be given every 10 years to maintain immunity against tetanus  Td may also be given as tetanus wound prophylaxis  Tdap Vaccine - COST NOT COVERED BY MEDICARE PART B Recommended at least once for all adults  For pregnant patients, recommended with each pregnancy  Shingles Vaccine (Shingrix) - COST NOT COVERED BY MEDICARE PART B  2 shot series recommended in those aged 48 and above     Health Maintenance Due:      Topic Date Due    Colorectal Cancer Screening  10/27/2019    HIV Screening  Completed    Hepatitis C Screening  Completed     Immunizations Due:      Topic Date Due    Influenza Vaccine (1) 09/01/2021     Advance Directives   What are advance directives? Advance directives are legal documents that state your wishes and plans for medical care  These plans are made ahead of time in case you lose your ability to make decisions for yourself  Advance directives can apply to any medical decision, such as the treatments you want, and if you want to donate organs  What are the types of advance directives? There are many types of advance directives, and each state has rules about how to use them  You may choose a combination of any of the following:  · Living will: This is a written record of the treatment you want  You can also choose which treatments you do not want, which to limit, and which to stop at a certain time   This includes surgery, medicine, IV fluid, and tube feedings  · Durable power of  for healthcare [facility]): This is a written record that states who you want to make healthcare choices for you when you are unable to make them for yourself  This person, called a proxy, is usually a family member or a friend  You may choose more than 1 proxy  · Do not resuscitate (DNR) order:  A DNR order is used in case your heart stops beating or you stop breathing  It is a request not to have certain forms of treatment, such as CPR  A DNR order may be included in other types of advance directives  · Medical directive: This covers the care that you want if you are in a coma, near death, or unable to make decisions for yourself  You can list the treatments you want for each condition  Treatment may include pain medicine, surgery, blood transfusions, dialysis, IV or tube feedings, and a ventilator (breathing machine)  · Values history: This document has questions about your views, beliefs, and how you feel and think about life  This information can help others choose the care that you would choose  Why are advance directives important? An advance directive helps you control your care  Although spoken wishes may be used, it is better to have your wishes written down  Spoken wishes can be misunderstood, or not followed  Treatments may be given even if you do not want them  An advance directive may make it easier for your family to make difficult choices about your care  Cigarette Smoking and Your Health   Risks to your health if you smoke:  Nicotine and other chemicals found in tobacco damage every cell in your body  Even if you are a light smoker, you have an increased risk for cancer, heart disease, and lung disease  If you are pregnant or have diabetes, smoking increases your risk for complications  Benefits to your health if you stop smoking:   · You decrease respiratory symptoms such as coughing, wheezing, and shortness of breath     · You reduce your risk for cancers of the lung, mouth, throat, kidney, bladder, pancreas, stomach, and cervix  If you already have cancer, you increase the benefits of chemotherapy  You also reduce your risk for cancer returning or a second cancer from developing  · You reduce your risk for heart disease, blood clots, heart attack, and stroke  · You reduce your risk for lung infections, and diseases such as pneumonia, asthma, chronic bronchitis, and emphysema  · Your circulation improves  More oxygen can be delivered to your body  If you have diabetes, you lower your risk for complications, such as kidney, artery, and eye diseases  You also lower your risk for nerve damage  Nerve damage can lead to amputations, poor vision, and blindness  · You improve your body's ability to heal and to fight infections  For more information and support to stop smoking:   · [employer]  Phone: 5- 309 - [phone]  Web Address: [employer]  How to Quit Using Smokeless Tobacco   Why it is important to stop using smokeless tobacco:  Smokeless tobacco comes in many forms  Examples include chew, snuff, dip, dissolvable tobacco, and snus  All smokeless tobacco products contain nicotine and may contain as much nicotine as 3 cigarettes  You may be physically dependent on nicotine  You may also be emotionally addicted to it  The cravings can be strong, but it is important to quit using smokeless tobacco  You will improve your health and decrease your cancer, stroke, and heart attack risk  Mouth sores and tooth problems will also improve when you quit  You can benefit from quitting no matter how long you have used smokeless tobacco    Prepare to stop using smokeless tobacco:  Nicotine is a highly addictive drug  Withdrawal symptoms can happen when you stop and make it hard to quit  The following can help keep you on track:  · Set a quit date  · Tell friends, family, and coworkers that you plan to quit      · Remove all smokeless tobacco products from your home, car, and workplace  Manage weight gain after you quit:  Nicotine can affect your metabolism  You may gain a few pounds after you quit  The following can help you control your weight:  · Eat healthy foods  · Drink water before, during, and between meals  · Exercise as directed  Weight Management   Why it is important to manage your weight:  Being overweight increases your risk of health conditions such as heart disease, high blood pressure, type 2 diabetes, and certain types of cancer  It can also increase your risk for osteoarthritis, sleep apnea, and other respiratory problems  Aim for a slow, steady weight loss  Even a small amount of weight loss can lower your risk of health problems  How to lose weight safely:  A safe and healthy way to lose weight is to eat fewer calories and get regular exercise  You can lose up about 1 pound a week by decreasing the number of calories you eat by 500 calories each day  Healthy meal plan for weight management:  A healthy meal plan includes a variety of foods, contains fewer calories, and helps you stay healthy  A healthy meal plan includes the following:  · Eat whole-grain foods more often  A healthy meal plan should contain fiber  Fiber is the part of grains, fruits, and vegetables that is not broken down by your body  Whole-grain foods are healthy and provide extra fiber in your diet  Some examples of whole-grain foods are whole-wheat breads and pastas, oatmeal, brown rice, and bulgur  · Eat a variety of vegetables every day  Include dark, leafy greens such as spinach, kale, ney greens, and mustard greens  Eat yellow and orange vegetables such as carrots, sweet potatoes, and winter squash  · Eat a variety of fruits every day  Choose fresh or canned fruit (canned in its own juice or light syrup) instead of juice  Fruit juice has very little or no fiber  · Eat low-fat dairy foods  Drink fat-free (skim) milk or 1% milk   Eat fat-free yogurt and low-fat cottage cheese  Try low-fat cheeses such as mozzarella and other reduced-fat cheeses  · Choose meat and other protein foods that are low in fat  Choose beans or other legumes such as split peas or lentils  Choose fish, skinless poultry (chicken or turkey), or lean cuts of red meat (beef or pork)  Before you cook meat or poultry, cut off any visible fat  · Use less fat and oil  Try baking foods instead of frying them  Add less fat, such as margarine, sour cream, regular salad dressing and mayonnaise to foods  Eat fewer high-fat foods  Some examples of high-fat foods include french fries, doughnuts, ice cream, and cakes  · Eat fewer sweets  Limit foods and drinks that are high in sugar  This includes candy, cookies, regular soda, and sweetened drinks  Exercise:  Exercise at least 30 minutes per day on most days of the week  Some examples of exercise include walking, biking, dancing, and swimming  You can also fit in more physical activity by taking the stairs instead of the elevator or parking farther away from stores  Ask your healthcare provider about the best exercise plan for you  © Copyright [employer] 2018 Information is for End User's use only and may not be sold, redistributed or otherwise used for commercial purposes  All illustrations and images included in CareNotes® are the copyrighted property of [employer]  or [employer] (Vaccine) for Adults   WHAT YOU NEED TO KNOW:   Several types of viruses cause the flu  The viruses change over time, so new vaccines are made each year  Get the vaccine as soon as recommended each year, usually in September or October  The vaccine begins to protect you about 2 weeks after you get it  The flu vaccine may cause mild symptoms, such as a fever, headache, and muscle aches  You may still get the flu after you receive the vaccine    DISCHARGE INSTRUCTIONS:   Call your local emergency number (910 in the [address] [place] [address],[address]) if:   · Your mouth and throat are swollen  · You are wheezing or having trouble breathing  · You have chest pain or your heart is beating faster than normal for you  · You feel like you are going to faint  Return to the emergency department if:   · Your face is red or swollen  · You have hives that spread over your body  Call your doctor if:   · You feel weak or dizzy  · You have increased pain, redness, or swelling around the area where the shot was given  · You have questions or concerns about the flu shot  Apply a warm compress  to the injection area to decrease pain and swelling  Follow up with your doctor as directed:  Write down your questions so you remember to ask them during your visits  © Copyright [address] 2021 Information is for End User's use only and may not be sold, redistributed or otherwise used for commercial purposes  All illustrations and images included in CareNotes® are the copyrighted property of A [employer]  or [facility] Santa   The above information is an  only  It is not intended as medical advice for individual conditions or treatments  Talk to your doctor, nurse or pharmacist before following any medical regimen to see if it is safe and effective for you

## 2022-02-18 NOTE — ASSESSMENT & PLAN NOTE
·  Patient is due for colonoscopy  ·  had consultation done by GI 09/2022  ·  encourage patient  To follow-up for colonoscopy

## 2022-02-18 NOTE — ASSESSMENT & PLAN NOTE
Lab Results   Component Value Date    HGBA1C 5 6 02/11/2022     ·  significantly improving  ·  continue metformin 1000 mg b i d   ·  up-to-date with diabetic eye exam   · Is seeing Podiatry next week for diabetic foot exam  ·  consider decreasing metformin if A1c remains at this level

## 2022-02-18 NOTE — ASSESSMENT & PLAN NOTE
Well controlled  ·  blood pressure today 122/80   · Patient is at goal of <130/80  ·  patient has difficulty exercising due to residual stroke symptoms of left hemiparesis  ·  patient has improved his diet and avoid salt  ·   02/11/2022 MCR = 7

## 2022-02-18 NOTE — PROGRESS NOTES
Assessment/Plan:      1  Medicare annual wellness visit, subsequent    2  Need for vaccination  -     influenza vaccine, quadrivalent, recombinant, PF, 0 5 mL, for patients 18 yr+ (FLUBLOK)    3  BMI 27 0-27 9,adult    4  Type 2 diabetes mellitus with other circulatory complication, without long-term current use of insulin ([facility])  Assessment & Plan:    Lab Results   Component Value Date    HGBA1C 5 6 02/11/2022     ·  significantly improving  ·  continue metformin 1000 mg b i d   ·  up-to-date with diabetic eye exam   · Is seeing Podiatry next week for diabetic foot exam  ·  consider decreasing metformin if A1c remains at this level    Orders:  -     glucose blood (OneTouch Ultra) test strip; Use 1 each in the morning Use as instructed  -     Comprehensive metabolic panel; Future  -     HEMOGLOBIN A1C W/ EAG ESTIMATION; Future    5  Hyperplastic colonic polyp, unspecified part of colon  Assessment & Plan:  ·  Patient is due for colonoscopy  ·  had consultation done by GI 09/2022  ·  encourage patient  To follow-up for colonoscopy        6  Carotid stenosis, bilateral  Assessment & Plan:  ·  Follows with vascular surgery Dr Hamlet Alberts  ·  last seen 11/24/2021  ·  carotids have remained stable patient denies any symptoms   · Due for repeat carotid duplex in 6 months and follow-up in 1 year      7  Benign essential hypertension  Assessment & Plan:   Well controlled  ·  blood pressure today 122/80   · Patient is at goal of <130/80  ·  patient has difficulty exercising due to residual stroke symptoms of left hemiparesis  ·  patient has improved his diet and avoid salt  ·   02/11/2022 MCR = 7    Orders:  -     Comprehensive metabolic panel; Future    8   Hemiparesis affecting left side as late effect of cerebrovascular accident [facility])  Assessment & Plan:   Stable  ·  previously follows with Neurology, Dr Curry Anne  ·  has not been seen since 04/2021  ·  patient is unsure if he needs further follow-up as symptoms have remains significantly stable      9  Benign prostatic hyperplasia without lower urinary tract symptoms  Assessment & Plan:  Stable  · PSA has remained 1 2-1 9 over the past 4 years  · Denies nocturia or other urinary symptoms      10  Mixed hyperlipidemia  Assessment & Plan:   Well controlled  · 2/11/22: chol/tg/hdl/ldl = 152/137/40/85  ·  continue atorvastatin 20 mg and Zetia 10 mg      11  Major depressive disorder with single episode, in partial remission [facility])  Assessment & Plan:  · Patients mood is great  · Takes classes on how to raise his kids and control anger  · Son is staying with his aunt  · Sertraline 100mg      12  Generalized anxiety disorder    13  Epilepsy due to old stroke [facility])  Assessment & Plan:  ·  Patient remained seizure-free  ·  continue Keppra 1000 mg b i d   ·  will repeat Keppra level at next visit    Orders:  -     Levetiracetam level; Future          Subjective:      Patient ID: Aggie Guerra is a 63 y o  male presents today for routine follow up and medicare annual wellness visit  His only complaint is left toe callus that he sees podiatry  Its worse when on his feet for more than 6 hours at a time at work  Had eye exam  Patient needs to have a colonoscopy    HPI    The following portions of the patient's history were reviewed and updated as appropriate: allergies, current medications, past family history, past medical history, past social history, past surgical history and problem list     Review of Systems   Constitutional: Negative for activity change, chills, fatigue and fever  HENT: Negative for congestion, hearing loss, rhinorrhea, sinus pressure, sinus pain, sneezing and sore throat  Eyes: Negative for visual disturbance  Respiratory: Negative for cough, chest tightness, shortness of breath and wheezing  Cardiovascular: Negative for chest pain, palpitations and leg swelling     Gastrointestinal: Negative for abdominal pain, blood in stool, constipation, diarrhea, nausea and vomiting  Genitourinary: Negative for difficulty urinating, dysuria, flank pain, frequency, hematuria and urgency  Musculoskeletal: Negative for back pain, myalgias and neck pain  Neurological: Negative for dizziness, syncope, light-headedness, numbness and headaches  Objective:      /80 (BP Location: Right arm, Patient Position: Sitting, Cuff Size: Standard)   Pulse 98   Temp 97 7 °F (36 5 °C) (Tympanic)   Resp 18   Ht 5' 8 5" (1 74 m)   Wt 83 9 kg (185 lb)   SpO2 97%   BMI 27 72 kg/m²          Physical Exam  Vitals reviewed  Constitutional:       General: He is not in acute distress  Appearance: He is well-developed  He is not diaphoretic  HENT:      Head: Normocephalic and atraumatic  Right Ear: External ear normal       Left Ear: External ear normal       Nose: Nose normal  No congestion  Mouth/Throat:      Mouth: Mucous membranes are moist       Pharynx: Oropharynx is clear  No oropharyngeal exudate  Eyes:      General: No scleral icterus  Pupils: Pupils are equal, round, and reactive to light  Neck:      Thyroid: No thyromegaly  Vascular: No JVD  Trachea: No tracheal deviation  Cardiovascular:      Rate and Rhythm: Normal rate and regular rhythm  Pulses: Normal pulses  Heart sounds: Normal heart sounds  No murmur heard  No friction rub  Pulmonary:      Effort: Pulmonary effort is normal  No respiratory distress  Breath sounds: Normal breath sounds  No wheezing or rales  Chest:      Chest wall: No tenderness  Abdominal:      General: Bowel sounds are normal  There is no distension  Palpations: Abdomen is soft  Tenderness: There is no abdominal tenderness  There is no right CVA tenderness, left CVA tenderness, guarding or rebound  Musculoskeletal:         General: No tenderness  Normal range of motion  Cervical back: Normal range of motion and neck supple  No tenderness  Right lower leg: No edema  Left lower leg: No edema  Lymphadenopathy:      Cervical: No cervical adenopathy  Skin:     General: Skin is warm and dry  Capillary Refill: Capillary refill takes less than 2 seconds  Neurological:      Mental Status: He is alert and oriented to person, place, and time  Mental status is at baseline  Deep Tendon Reflexes: Reflexes are normal and symmetric  Comments:  Left-sided hemiparesis and footdrop   Psychiatric:         Mood and Affect: Mood normal          Behavior: Behavior normal          Thought Content:  Thought content normal          Judgment: Judgment normal

## 2022-02-18 NOTE — PROGRESS NOTES
Assessment and Plan:     Problem List Items Addressed This Visit        Digestive    Hyperplastic colonic polyp     ·  Patient is due for colonoscopy  ·  had consultation done by GI 09/2022  ·  encourage patient  To follow-up for colonoscopy              Endocrine    Type 2 diabetes mellitus with circulatory disorder, without long-term current use of insulin ([facility])       Lab Results   Component Value Date    HGBA1C 5 6 02/11/2022     ·  significantly improving  ·  continue metformin 1000 mg b i d   ·  up-to-date with diabetic eye exam   · Is seeing Podiatry next week for diabetic foot exam  ·  consider decreasing metformin if A1c remains at this level         Relevant Medications    glucose blood (OneTouch Ultra) test strip    Other Relevant Orders    Comprehensive metabolic panel    HEMOGLOBIN A1C W/ EAG ESTIMATION       Cardiovascular and Mediastinum    Carotid stenosis, bilateral     ·  Follows with vascular surgery Dr Jannie Crain  ·  last seen 11/24/2021  ·  carotids have remained stable patient denies any symptoms   · Due for repeat carotid duplex in 6 months and follow-up in 1 year         Benign essential hypertension      Well controlled  ·  blood pressure today 122/80   · Patient is at goal of <130/80  ·  patient has difficulty exercising due to residual stroke symptoms of left hemiparesis  ·  patient has improved his diet and avoid salt  ·   02/11/2022 MCR = 7         Relevant Orders    Comprehensive metabolic panel       Nervous and Auditory    Hemiparesis affecting left side as late effect of cerebrovascular accident ([address] )      Stable  ·  previously follows with Neurology, Dr Mona Mcmillan  ·  has not been seen since 04/2021  ·  patient is unsure if he needs further follow-up as symptoms have remains significantly stable         Epilepsy due to old stroke ([address] )     ·  Patient remained seizure-free  ·  continue Keppra 1000 mg b i d   ·  will repeat Keppra level at next visit         Relevant Orders Levetiracetam level       Genitourinary    Benign prostatic hyperplasia without lower urinary tract symptoms     Stable  · PSA has remained 1 2-1 9 over the past 4 years  · Denies nocturia or other urinary symptoms            Other    Generalized anxiety disorder    Mixed hyperlipidemia      Well controlled  · 2/11/22: chol/tg/hdl/ldl = 152/137/40/85  ·  continue atorvastatin 20 mg and Zetia 10 mg         Major depressive disorder with single episode, in partial remission ([facility] [address] )     · Patients mood is great  · Takes classes on how to raise his kids and control anger  · Son is staying with his aunt  · Sertraline 100mg           Other Visit Diagnoses     Medicare annual wellness visit, subsequent    -  Primary    Need for vaccination        Relevant Orders    influenza vaccine, quadrivalent, recombinant, PF, 0 5 mL, for patients 18 yr+ ([facility]) (Completed)    BMI 27 0-27 9,adult               Preventive health issues were discussed with patient, and age appropriate screening tests were ordered as noted in patient's After Visit Summary  Personalized health advice and appropriate referrals for health education or preventive services given if needed, as noted in patient's After Visit Summary       History of Present Illness:     Patient presents for Medicare Annual Wellness visit    Patient Care Team:  Natasha Fagan DO as PCP - General (Family Medicine)  Mima Patterson MD as PCP - [address] [place] [address] (RTE)  Mima Patterson MD as PCP - PCP-[facility] (RTE)  James Canada MD     Problem List:     Patient Active Problem List   Diagnosis    Hemiparesis affecting left side as late effect of cerebrovascular accident [facility])    Carotid stenosis, bilateral    Nicotine dependence with nicotine-induced disorder    Benign essential hypertension    Depressive disorder    Type 2 diabetes mellitus with circulatory disorder, without long-term current use of insulin ([facility] [address] )    Generalized anxiety disorder    History of cerebrovascular accident with residual deficit    Mixed hyperlipidemia    Overweight    Patent foramen ovale    Hyperplastic colonic polyp    Benign prostatic hyperplasia without lower urinary tract symptoms    Major depressive disorder with single episode, in partial remission (HCC)    Adjustment disorder with depressed mood    Epilepsy due to old stroke [facility])      Past Medical and Surgical History:     Past Medical History:   Diagnosis Date    CAD (coronary artery disease) 01/15/2014    100% occlusion of right    CVA (cerebral vascular accident) ([facility] [address] ) 01/15/2014    Depressive disorder     Diabetes [facility]     Dyslipidemia     Erectile dysfunction 2009    Hemiparesis ([facility] ) 01/15/2014    left    Hypertension     Seizure ([facility] ) 2014    Stroke [facility])      Past Surgical History:   Procedure Laterality Date    COLONOSCOPY  10/27/2016    1 cm cecal polyp and 2 other polyps tubular adenomas    Multiple small polyps in rectum fulgurated by Dr Hortencia Mason        Family History:     Family History   Problem Relation Age of Onset   [facility] Cancer Mother         unknown    Diabetes Mother    [facility] Diabetes Father     Diabetes Brother     Hypertension Family     Diabetes Family     Pancreatic cancer Cousin       Social History:     Social History     Socioeconomic History    Marital status: Single     Spouse name: None    Number of children: None    Years of education: None    Highest education level: None   Occupational History    Occupation: Disabled   Tobacco Use    Smoking status: Current Every Day Smoker     Packs/day: 0 25     Types: Cigars     Last attempt to quit: 2014     Years since quittin 1    Smokeless tobacco: Current User    Tobacco comment: heavy tob smoke - 3 cigars day - quit 14- no passive smoke exposure as per [employer]   Vaping Use    Vaping Use: Never used   Substance and Sexual Activity    Alcohol use: Yes     Comment: occasional    Drug use: No    Sexual activity: Not Currently   Other Topics Concern    None   Social History Narrative    None     Social Determinants of Health     Financial Resource Strain: Not on file   Food Insecurity: Not on file   Transportation Needs: Not on file   Physical Activity: Not on file   Stress: Not on file   Social Connections: Not on file   Intimate Partner Violence: Not on file   Housing Stability: Not on file      Medications and Allergies:     Current Outpatient Medications   Medication Sig Dispense Refill    aspirin 81 mg chewable tablet Chew 1 tablet every 24 hours      atorvastatin (LIPITOR) 20 mg tablet Take 1 tablet (20 mg total) by mouth daily 90 tablet 2    ezetimibe (ZETIA) 10 mg tablet Take 1 tablet (10 mg total) by mouth daily 90 tablet 3    levETIRAcetam (KEPPRA) 1000 MG tablet Take 1 tablet (1,000 mg total) by mouth 2 (two) times a day 180 tablet 3    losartan (COZAAR) 50 mg tablet Take 1 tablet (50 mg total) by mouth daily 90 tablet 3    metFORMIN (GLUCOPHAGE) 1000 MG tablet TAKE 1 TABLET BY MOUTH TWICE A DAY WITH MEALS 180 tablet 2    sertraline (ZOLOFT) 100 mg tablet Take 1 tablet (100 mg total) by mouth daily 90 tablet 1    glucose blood (OneTouch Ultra) test strip Use 1 each in the morning Use as instructed 100 strip 3    glucose blood test strip Use as instructed  ONE TOUCH ULTRA (Patient not taking: Reported on 11/24/2021 ) 100 each 5    Lancets (onetouch ultrasoft) lancets Use daily (Patient not taking: Reported on 11/24/2021 ) 90 each 1    Sodium Sulfate-Mag Sulfate-KCl (Sutab) 9334-006-637 MG TABS Take 1 kit by mouth see administration instructions Follow instructions given at office visit  Pt has medicare copay card (Patient not taking: Reported on 11/24/2021 ) 24 tablet 0     No current facility-administered medications for this visit       Allergies   Allergen Reactions    No Known Allergies       Immunizations:     Immunization History   Administered Date(s) Administered    COVID-19 MODERNA VACC 0 25 ML IM BOOSTER 01/05/2022    COVID-19 MODERNA VACC 0 5 ML IM 04/15/2021, 05/19/2021    INFLUENZA 09/16/2015, 09/26/2016, 09/26/2016, 09/14/2017, 09/14/2017, 11/12/2018    Influenza, injectable, quadrivalent, preservative free 0 5 mL 09/16/2019    Influenza, recombinant, quadrivalent,injectable, preservative free 11/12/2018, 02/17/2021, 02/18/2022    Influenza, seasonal, injectable 12/15/2014, 09/16/2015, 09/18/2019    Pneumococcal Polysaccharide PPV23 01/22/2020    Tdap 09/14/2012      Health Maintenance:         Topic Date Due    Colorectal Cancer Screening  10/27/2019    HIV Screening  Completed    Hepatitis C Screening  Completed     There are no preventive care reminders to display for this patient  Medicare Health Risk Assessment:     /80 (BP Location: Right arm, Patient Position: Sitting, Cuff Size: Standard)   Pulse 98   Temp 97 7 °F (36 5 °C) (Tympanic)   Resp 18   Ht 5' 8 5" (1 74 m)   Wt 83 9 kg (185 lb)   SpO2 97%   BMI 27 72 kg/m²      Last Medicare Wellness visit information reviewed, patient interviewed and updates made to the record today  Health Risk Assessment:   Patient rates overall health as excellent  Patient feels that their physical health rating is much better  Patient is very satisfied with their life  Eyesight was rated as same  Hearing was rated as same  Patient feels that their emotional and mental health rating is much better  Patients states they are never, rarely angry  Patient states they are never, rarely unusually tired/fatigued  Pain experienced in the last 7 days has been some  Patient states that he has experienced no weight loss or gain in last 6 months  Depression Screening:   PHQ-9 Score: 0      Fall Risk Screening: In the past year, patient has experienced: no history of falling in past year      Home Safety:  Patient does not have trouble with stairs inside or outside of their home   Patient has working smoke alarms and has working carbon monoxide detector  Home safety hazards include: none  Nutrition:   Current diet is Regular  Medications:   Patient is currently taking over-the-counter supplements  OTC medications include: see medication list  Patient is able to manage medications  Activities of Daily Living (ADLs)/Instrumental Activities of Daily Living (IADLs):   Walk and transfer into and out of bed and chair?: Yes  Dress and groom yourself?: Yes    Bathe or shower yourself?: Yes    Feed yourself?  Yes  Do your laundry/housekeeping?: Yes  Manage your money, pay your bills and track your expenses?: Yes  Make your own meals?: Yes    Do your own shopping?: Yes    Previous Hospitalizations:   Any hospitalizations or ED visits within the last 12 months?: No      Advance Care Planning:   Living will: No    Durable POA for healthcare: No    Advanced directive: No    Advanced directive counseling given: Yes    Five wishes given: Yes    End of Life Decisions reviewed with patient: Yes    Provider agrees with end of life decisions: Yes      Cognitive Screening:   Provider or family/friend/caregiver concerned regarding cognition?: No    PREVENTIVE SCREENINGS      Cardiovascular Screening:    General: History Lipid Disorder and Screening Current      Diabetes Screening:     General: History Diabetes and Screening Current      Colorectal Cancer Screening:     General: Screening Current      Prostate Cancer Screening:    General: Screening Current      Osteoporosis Screening:    General: Screening Not Indicated      Abdominal Aortic Aneurysm (AAA) Screening:    Risk factors include: tobacco use        General: Screening Not Indicated      Lung Cancer Screening:     General: Screening Not Indicated      Hepatitis C Screening:    General: Screening Current    Screening, Brief Intervention, and Referral to Treatment (SBIRT)    Screening  Typical number of drinks in a day: 0  Typical number of drinks in a week: 3  Interpretation: Low risk drinking behavior  AUDIT-C Screenin) How often did you have a drink containing alcohol in the past year? 2 to 3 times a week  2) How many drinks did you have on a typical day when you were drinking in the past year? 1 to 2  3) How often did you have 6 or more drinks on one occasion in the past year? never    AUDIT-C Score: 3  Interpretation: Score 0-3 (male): Negative screen for alcohol misuse    Other Counseling Topics:   Car/seat belt/driving safety, skin self-exam, sunscreen and calcium and vitamin D intake and regular weightbearing exercise  Anatoly Samuels, DO BMI Counseling: Body mass index is 27 72 kg/m²  The BMI is above normal  Nutrition recommendations include reducing portion sizes, decreasing overall calorie intake, 3-5 servings of fruits/vegetables daily, reducing fast food intake, consuming healthier snacks, decreasing soda and/or juice intake, moderation in carbohydrate intake, increasing intake of lean protein, reducing intake of saturated fat and trans fat and reducing intake of cholesterol  Exercise recommendations include moderate aerobic physical activity for 150 minutes/week, exercising 3-5 times per week and strength training exercises

## 2022-02-18 NOTE — ASSESSMENT & PLAN NOTE
·  Follows with vascular surgery Dr Lucio Hunter  ·  last seen 11/24/2021  ·  carotids have remained stable patient denies any symptoms   · Due for repeat carotid duplex in 6 months and follow-up in 1 year

## 2022-02-18 NOTE — ASSESSMENT & PLAN NOTE
· Patients mood is great  · Takes classes on how to raise his kids and control anger  · Son is staying with his aunt  · Sertraline 100mg

## 2022-02-18 NOTE — ASSESSMENT & PLAN NOTE
Stable  · PSA has remained 1 2-1 9 over the past 4 years  · Denies nocturia or other urinary symptoms

## 2022-02-18 NOTE — ASSESSMENT & PLAN NOTE
Well controlled  · 2/11/22: chol/tg/hdl/ldl = 152/137/40/85  ·  continue atorvastatin 20 mg and Zetia 10 mg

## 2022-02-21 ENCOUNTER — TELEPHONE (OUTPATIENT)
Dept: ADMINISTRATIVE | Facility: OTHER | Age: 62
End: 2022-02-21

## 2022-02-21 NOTE — TELEPHONE ENCOUNTER
----- Message from Savanah Salgueor MA sent at 2/18/2022  1:20 PM EST -----  Regarding: CARE GAP REQUEST  02/18/22 1:20 PM    Brooke, our patient Layla Álvarez has had Diabetic Eye Examcompleted/performed  Please assist in updating the patient chart by pulling the document from the Media Tab  The date of service is 2/8/22       Thank you,  Savanah Salguero MA  [facility] PRIMARY CARE [place]

## 2022-02-21 NOTE — TELEPHONE ENCOUNTER
Upon review of the In Basket request we were able to locate, review, and update the patient chart as requested for Diabetic Eye Exam     Any additional questions or concerns should be emailed to the Practice Liaisons via [email or web address] email, please do not reply via In Basket      Thank you  Maine Garcia MA

## 2022-05-05 ENCOUNTER — TELEPHONE (OUTPATIENT)
Dept: ADMINISTRATIVE | Facility: OTHER | Age: 62
End: 2022-05-05

## 2022-05-05 NOTE — TELEPHONE ENCOUNTER
----- Message from Luke Kyle MA sent at 5/4/2022  3:01 PM EDT -----  Regarding: Care Gap Request  05/04/22 3:01 PM    Hello, our patient attached above has had Diabetic Foot Exam completed/performed  Please assist in updating the patient chart by pulling the Care Everywhere (CE) document  The date of service is 0105/2022       Thank you,  Luke Kyle MA  [facility] PRIMARY CARE [place]

## 2022-05-05 NOTE — TELEPHONE ENCOUNTER
Upon review of the In Basket request we have found/obtained the documentation  After careful review of the document we are unable to complete this request for Diabetic Foot Exam because the documentation does not have the proper verbiage (wording) needed to close the requested care gap(s)  Any additional questions or concerns should be emailed to the Practice Liaisons via [email or web address] email, please do not reply via In Basket      Thank you  Refugio Dacosta

## 2022-05-11 DIAGNOSIS — F32.A DEPRESSIVE DISORDER: ICD-10-CM

## 2022-05-12 RX ORDER — SERTRALINE HYDROCHLORIDE 100 MG/1
TABLET, FILM COATED ORAL
Qty: 90 TABLET | Refills: 1 | Status: SHIPPED | OUTPATIENT
Start: 2022-05-12

## 2022-05-18 ENCOUNTER — HOSPITAL ENCOUNTER (OUTPATIENT)
Dept: NON INVASIVE DIAGNOSTICS | Facility: CLINIC | Age: 62
Discharge: HOME | End: 2022-05-18
Payer: COMMERCIAL

## 2022-05-18 DIAGNOSIS — I65.23 CAROTID STENOSIS, BILATERAL: ICD-10-CM

## 2022-05-18 PROCEDURE — 93880 EXTRACRANIAL BILAT STUDY: CPT

## 2022-05-19 PROCEDURE — 93880 EXTRACRANIAL BILAT STUDY: CPT | Performed by: SURGERY

## 2022-05-20 ENCOUNTER — TRANSCRIBE ORDERS (OUTPATIENT)
Dept: VASCULAR SURGERY | Facility: CLINIC | Age: 62
End: 2022-05-20

## 2022-05-20 DIAGNOSIS — I65.23 CAROTID STENOSIS, BILATERAL: Primary | ICD-10-CM

## 2022-05-26 DIAGNOSIS — I69.398 EPILEPSY DUE TO OLD STROKE (HCC): ICD-10-CM

## 2022-05-26 DIAGNOSIS — G40.909 EPILEPSY DUE TO OLD STROKE (HCC): ICD-10-CM

## 2022-05-26 RX ORDER — LEVETIRACETAM 1000 MG/1
TABLET ORAL
Qty: 180 TABLET | Refills: 1 | Status: SHIPPED | OUTPATIENT
Start: 2022-05-26

## 2022-05-26 NOTE — TELEPHONE ENCOUNTER
Called patient and left a message  Advised patient last office visit was 4/26/21 w/Dr Franklin Hu  Patient was to follow up w/Shavonne after that in 6 months  Advised patient he also was to have a 1 year follow up in April of this year w/Dr Franklin Hu  Advised patient we need to schedule his follow up w/Shavonne so we can do the medication refills  Advised patient to callback to schedule

## 2022-06-01 ENCOUNTER — TELEPHONE (OUTPATIENT)
Dept: VASCULAR SURGERY | Facility: CLINIC | Age: 62
End: 2022-06-01

## 2022-06-01 NOTE — TELEPHONE ENCOUNTER
Patient returned call in regards to scheduling his 6 month CV and RFM visit  Patient stated he will call when he is ready to schedule and had his work schedule so he knows when he is free

## 2022-06-01 NOTE — TELEPHONE ENCOUNTER
Patient is due for a CV after 11/18/22 and an OV per recall  Called patient to schedule - left message  Attempted to contact patient to schedule appointment(s) listed below  Requested patient call [phone] option [phone] to schedule appointment(s)  Patient's appointment(s) are due on or after 11/18/22      Dopplers  [] Abdominal Aorta Iliac (AOIL)  [x] Carotid (CV)   [] Celiac and/or Mesenteric  [] Endovascular Aortic Repair (EVAR)   [] Hemodialysis Access (HD)   [] Lower Limb Arterial (GARRETT)  [] Lower Limb Venous Duplex with Reflux (LEVDR)  [] Renal Artery  [] Upper Limb (UEA)    Advanced Imaging   [] CTA abdomen    [] CTA abdomen & pelvis    [] CT abdomen with/ without contrast  [] CT abdomen with contrast  [] CT abdomen without contrast    [] CT abdomen & pelvis with/ without contrast  [] CT abdomen & pelvis with contrast  [] CT abdomen & pelvis without contrast    Office Visit   [] New patient, patient last seen over 3 years ago  [x] Risk factor modification (RFM)   [] Follow up

## 2022-06-14 ENCOUNTER — OFFICE VISIT (OUTPATIENT)
Dept: FAMILY MEDICINE CLINIC | Facility: CLINIC | Age: 62
End: 2022-06-14
Payer: COMMERCIAL

## 2022-06-14 VITALS
WEIGHT: 180.4 LBS | TEMPERATURE: 97.2 F | HEIGHT: 69 IN | SYSTOLIC BLOOD PRESSURE: 124 MMHG | RESPIRATION RATE: 18 BRPM | DIASTOLIC BLOOD PRESSURE: 80 MMHG | HEART RATE: 73 BPM | BODY MASS INDEX: 26.72 KG/M2 | OXYGEN SATURATION: 95 %

## 2022-06-14 DIAGNOSIS — H61.23 BILATERAL IMPACTED CERUMEN: Primary | ICD-10-CM

## 2022-06-14 PROCEDURE — 3079F DIAST BP 80-89 MM HG: CPT | Performed by: NURSE PRACTITIONER

## 2022-06-14 PROCEDURE — 99213 OFFICE O/P EST LOW 20 MIN: CPT | Performed by: NURSE PRACTITIONER

## 2022-06-14 PROCEDURE — 3008F BODY MASS INDEX DOCD: CPT | Performed by: NURSE PRACTITIONER

## 2022-06-14 PROCEDURE — 69209 REMOVE IMPACTED EAR WAX UNI: CPT | Performed by: NURSE PRACTITIONER

## 2022-06-14 PROCEDURE — 3074F SYST BP LT 130 MM HG: CPT | Performed by: NURSE PRACTITIONER

## 2022-06-14 NOTE — PROGRESS NOTES
Subjective:   Chief Complaint   Patient presents with    Cerumen Impaction        Patient ID: Sita Woody is a 63 y o  male  Presents today for cerumen impaction removal   Patient is had issues and has needed his ears flushed proximally every 2-2 and half years  He did use Debrox prior to coming      The following portions of the patient's history were reviewed and updated as appropriate: allergies, current medications, past family history, past medical history, past social history, past surgical history and problem list     Review of Systems   Constitutional: Negative for chills, fatigue and fever  HENT: Positive for hearing loss (cerumen impaction)  Respiratory: Negative for cough and shortness of breath  Cardiovascular: Negative for chest pain and palpitations  Psychiatric/Behavioral: Negative for dysphoric mood  The patient is not nervous/anxious  Objective:  Vitals:    06/14/22 0824   BP: 124/80   BP Location: Right arm   Patient Position: Sitting   Cuff Size: Standard   Pulse: 73   Resp: 18   Temp: (!) 97 2 °F (36 2 °C)   TempSrc: Temporal   SpO2: 95%   Weight: 81 8 kg (180 lb 6 4 oz)   Height: 5' 8 5" (1 74 m)      Physical Exam  Constitutional:       Appearance: Normal appearance  HENT:      Right Ear: There is impacted cerumen  Left Ear: There is impacted cerumen  Ears:      Comments: Bilateral TM not visualized secondary to bilateral impacted cerumen  Post irrigation normal bilateral TM and canal  Cardiovascular:      Rate and Rhythm: Normal rate and regular rhythm  Pulses: Normal pulses  Heart sounds: Normal heart sounds  Pulmonary:      Effort: Pulmonary effort is normal       Breath sounds: Normal breath sounds  Skin:     Capillary Refill: Capillary refill takes less than 2 seconds  Neurological:      Mental Status: He is alert and oriented to person, place, and time     Psychiatric:         Mood and Affect: Mood normal          Behavior: Behavior normal            Ear cerumen removal    Date/Time: 6/14/2022 8:53 AM  Performed by: FELICE Rocha  Authorized by: FELICE Rocha   Universal Protocol:  Consent: Verbal consent obtained  Consent given by: patient  Patient understanding: patient states understanding of the procedure being performed      Patient location:  Clinic  Procedure details:     Local anesthetic:  None    Location:  L ear and R ear    Procedure type: irrigation only      Approach:  Natural orifice  Post-procedure details:     Complication:  None    Hearing quality:  Improved    Patient tolerance of procedure: Tolerated well, no immediate complications    Assessment/Plan:    No problem-specific Assessment & Plan notes found for this encounter         Diagnoses and all orders for this visit:    Bilateral impacted cerumen  -     Ear cerumen removal

## 2022-07-08 DIAGNOSIS — E11.59 TYPE 2 DIABETES MELLITUS WITH OTHER CIRCULATORY COMPLICATION, WITHOUT LONG-TERM CURRENT USE OF INSULIN (HCC): ICD-10-CM

## 2022-08-15 ENCOUNTER — RA CDI HCC (OUTPATIENT)
Dept: OTHER | Facility: HOSPITAL | Age: 62
End: 2022-08-15

## 2022-08-15 NOTE — PROGRESS NOTES
[address]  coding opportunities     E11 [id number]     Chart Reviewed number of suggestions sent to Provider: 1     Patients Insurance     Medicare Insurance: [address] [place] [address]

## 2022-08-16 ENCOUNTER — APPOINTMENT (OUTPATIENT)
Dept: LAB | Facility: HOSPITAL | Age: 62
End: 2022-08-16
Payer: COMMERCIAL

## 2022-08-16 DIAGNOSIS — I10 BENIGN ESSENTIAL HYPERTENSION: ICD-10-CM

## 2022-08-16 DIAGNOSIS — I69.398 EPILEPSY DUE TO OLD STROKE (HCC): ICD-10-CM

## 2022-08-16 DIAGNOSIS — G40.909 EPILEPSY DUE TO OLD STROKE (HCC): ICD-10-CM

## 2022-08-16 DIAGNOSIS — E11.59 TYPE 2 DIABETES MELLITUS WITH OTHER CIRCULATORY COMPLICATION, WITHOUT LONG-TERM CURRENT USE OF INSULIN (HCC): ICD-10-CM

## 2022-08-16 LAB
ALBUMIN SERPL BCP-MCNC: 3.4 G/DL (ref 3.5–5)
ALP SERPL-CCNC: 67 U/L (ref 46–116)
ALT SERPL W P-5'-P-CCNC: 28 U/L (ref 12–78)
ANION GAP SERPL CALCULATED.3IONS-SCNC: 6 MMOL/L (ref 4–13)
AST SERPL W P-5'-P-CCNC: 15 U/L (ref 5–45)
BILIRUB SERPL-MCNC: 0.4 MG/DL (ref 0.2–1)
BUN SERPL-MCNC: 8 MG/DL (ref 5–25)
CALCIUM ALBUM COR SERPL-MCNC: 9.6 MG/DL (ref 8.3–10.1)
CALCIUM SERPL-MCNC: 9.1 MG/DL (ref 8.3–10.1)
CHLORIDE SERPL-SCNC: 102 MMOL/L (ref 96–108)
CO2 SERPL-SCNC: 29 MMOL/L (ref 21–32)
CREAT SERPL-MCNC: 0.61 MG/DL (ref 0.6–1.3)
EST. AVERAGE GLUCOSE BLD GHB EST-MCNC: 137 MG/DL
GFR SERPL CREATININE-BSD FRML MDRD: 108 ML/MIN/1.73SQ M
GLUCOSE P FAST SERPL-MCNC: 151 MG/DL (ref 65–99)
HBA1C MFR BLD: 6.4 %
POTASSIUM SERPL-SCNC: 4 MMOL/L (ref 3.5–5.3)
PROT SERPL-MCNC: 7 G/DL (ref 6.4–8.4)
SODIUM SERPL-SCNC: 137 MMOL/L (ref 135–147)

## 2022-08-16 PROCEDURE — 80177 DRUG SCRN QUAN LEVETIRACETAM: CPT

## 2022-08-16 PROCEDURE — 83036 HEMOGLOBIN GLYCOSYLATED A1C: CPT

## 2022-08-16 PROCEDURE — 36415 COLL VENOUS BLD VENIPUNCTURE: CPT

## 2022-08-16 PROCEDURE — 80053 COMPREHEN METABOLIC PANEL: CPT

## 2022-08-16 PROCEDURE — 3044F HG A1C LEVEL LT 7.0%: CPT | Performed by: FAMILY MEDICINE

## 2022-08-19 ENCOUNTER — OFFICE VISIT (OUTPATIENT)
Dept: FAMILY MEDICINE CLINIC | Facility: CLINIC | Age: 62
End: 2022-08-19
Payer: COMMERCIAL

## 2022-08-19 VITALS
TEMPERATURE: 97.8 F | OXYGEN SATURATION: 94 % | WEIGHT: 183.6 LBS | HEART RATE: 89 BPM | HEIGHT: 69 IN | DIASTOLIC BLOOD PRESSURE: 70 MMHG | BODY MASS INDEX: 27.19 KG/M2 | SYSTOLIC BLOOD PRESSURE: 124 MMHG | RESPIRATION RATE: 18 BRPM

## 2022-08-19 DIAGNOSIS — F32.4 MAJOR DEPRESSIVE DISORDER WITH SINGLE EPISODE, IN PARTIAL REMISSION (HCC): ICD-10-CM

## 2022-08-19 DIAGNOSIS — N40.0 BENIGN PROSTATIC HYPERPLASIA WITHOUT LOWER URINARY TRACT SYMPTOMS: ICD-10-CM

## 2022-08-19 DIAGNOSIS — F17.299 OTHER TOBACCO PRODUCT NICOTINE DEPENDENCE WITH NICOTINE-INDUCED DISORDER: ICD-10-CM

## 2022-08-19 DIAGNOSIS — I10 BENIGN ESSENTIAL HYPERTENSION: ICD-10-CM

## 2022-08-19 DIAGNOSIS — I69.398 EPILEPSY DUE TO OLD STROKE (HCC): ICD-10-CM

## 2022-08-19 DIAGNOSIS — G40.909 EPILEPSY DUE TO OLD STROKE (HCC): ICD-10-CM

## 2022-08-19 DIAGNOSIS — E78.2 MIXED HYPERLIPIDEMIA: ICD-10-CM

## 2022-08-19 DIAGNOSIS — E11.59 TYPE 2 DIABETES MELLITUS WITH OTHER CIRCULATORY COMPLICATION, WITHOUT LONG-TERM CURRENT USE OF INSULIN (HCC): Primary | ICD-10-CM

## 2022-08-19 DIAGNOSIS — E66.3 OVERWEIGHT: ICD-10-CM

## 2022-08-19 DIAGNOSIS — K63.5 HYPERPLASTIC COLONIC POLYP, UNSPECIFIED PART OF COLON: ICD-10-CM

## 2022-08-19 LAB — LEVETIRACETAM SERPL-MCNC: 37.5 UG/ML (ref 10–40)

## 2022-08-19 PROCEDURE — 3078F DIAST BP <80 MM HG: CPT | Performed by: FAMILY MEDICINE

## 2022-08-19 PROCEDURE — 3074F SYST BP LT 130 MM HG: CPT | Performed by: FAMILY MEDICINE

## 2022-08-19 PROCEDURE — 99214 OFFICE O/P EST MOD 30 MIN: CPT | Performed by: FAMILY MEDICINE

## 2022-08-19 PROCEDURE — 3725F SCREEN DEPRESSION PERFORMED: CPT | Performed by: FAMILY MEDICINE

## 2022-08-19 NOTE — ASSESSMENT & PLAN NOTE
· Continues to smoke 4-5 small cigars daily  · Uses nicotine losenges on and off  · Encouraged cessation  · Patient will try to quit by next visit in 6 months

## 2022-08-19 NOTE — ASSESSMENT & PLAN NOTE
· Follows with neurology  · Remains on keppra 1000mg  · Follows with Dr Denia Us  · Encouraged patient to schedule follow up appt

## 2022-08-19 NOTE — PROGRESS NOTES
Patient's shoes and socks removed  Right Foot/Ankle   Right Foot Inspection  Skin Exam: skin normal and skin intact  No dry skin, no warmth, no callus, no erythema, no maceration, no abnormal color, no pre-ulcer, no ulcer and no callus  Toe Exam: ROM and strength within normal limits  Sensory   Monofilament testing: intact    Vascular  Capillary refills: < 3 seconds  The right DP pulse is 2+  The right PT pulse is 2+  Left Foot/Ankle  Left Foot Inspection  Skin Exam: skin normal and skin intact  No dry skin, no warmth, no erythema, no maceration, normal color, no pre-ulcer, no ulcer and no callus  Toe Exam: ROM and strength within normal limits  Sensory   Monofilament testing: intact    Vascular  Capillary refills: < 3 seconds  The left DP pulse is 2+  The left PT pulse is 2+  Assign Risk Category  No deformity present  No loss of protective sensation  No weak pulses  Risk: 0           Assessment/Plan:      1  Type 2 diabetes mellitus with other circulatory complication, without long-term current use of insulin (HCC)  Assessment & Plan:    Lab Results   Component Value Date    HGBA1C 6 4 (H) 08/16/2022     stable  Fasting BG range: 129-164  Goal A1C <7  Reconciled home medication regimen  Discussed importance of strict carb control diet and weight reduction  Up to date with diabetic eye exam  Diabetic foot exam completed today    Orders:  -     HEMOGLOBIN A1C W/ EAG ESTIMATION; Future; Expected date: 02/19/2023  -     Comprehensive metabolic panel; Future; Expected date: 02/19/2023    2  Hyperplastic colonic polyp, unspecified part of colon  Assessment & Plan:  Patient is due for colonoscopy  Did not follow up with Dr Refugio Saunders due to logistics  Will refer patient to Dr May Devine     Orders:  -     Ambulatory Referral to Colorectal Surgery; Future    3  Benign essential hypertension  Assessment & Plan:  Well controlled on present regimen   Continue losartan 50mg  At goal <130/80      4   Benign prostatic hyperplasia without lower urinary tract symptoms    5  Overweight  Assessment & Plan:  Wt Readings from Last 3 Encounters:   08/19/22 83 3 kg (183 lb 9 6 oz)   06/14/22 81 8 kg (180 lb 6 4 oz)   02/18/22 83 9 kg (185 lb)       Patient is educated on the importance of portion control dieting  discussed the importance of exercise and recommend at least 30 minutes, 5 times weekly  discussed benefits of weight loss  patient acknowledges that they understood what is discussed      6  Other tobacco product nicotine dependence with nicotine-induced disorder  Assessment & Plan:  Continues to smoke 4-5 small cigars daily  Uses nicotine losenges on and off  Encouraged cessation  Patient will try to quit by next visit in 6 months        7  Mixed hyperlipidemia  Assessment & Plan:  Continue lipitor 20mg and zetia 10mg    Orders:  -     Lipid panel; Future; Expected date: 02/19/2023    8  Major depressive disorder with single episode, in partial remission [facility])  Assessment & Plan:  Mood is good  Continues to work at coffee shop  Has good energy  Continue sertraline 100mg      9  Epilepsy due to old stroke [facility])            Subjective:      Patient ID: Chio Villa is a 63 y o  male presents today for routine follow up  He is doing well and has no complaints today  HPI    The following portions of the patient's history were reviewed and updated as appropriate: allergies, current medications, past family history, past medical history, past social history, past surgical history and problem list     Review of Systems   Constitutional: Negative for activity change, chills, diaphoresis and fever  HENT: Negative for ear pain, hearing loss, postnasal drip, rhinorrhea, sinus pressure, sinus pain, sneezing and sore throat  Respiratory: Negative for cough, chest tightness, shortness of breath and wheezing  Cardiovascular: Negative for chest pain, palpitations and leg swelling     Gastrointestinal: Negative for abdominal pain, blood in stool, constipation, diarrhea, nausea and vomiting  Genitourinary: Negative for dysuria, frequency, hematuria and urgency  Musculoskeletal: Negative for arthralgias and myalgias  Neurological: Negative for dizziness, syncope, weakness, light-headedness, numbness and headaches  Objective:      /70 (BP Location: Left arm, Patient Position: Sitting, Cuff Size: Standard)   Pulse 89   Temp 97 8 °F (36 6 °C) (Temporal)   Resp 18   Ht 5' 8 5" (1 74 m)   Wt 83 3 kg (183 lb 9 6 oz)   SpO2 94%   BMI 27 51 kg/m²          Physical Exam  Vitals reviewed  Constitutional:       General: He is not in acute distress  Appearance: He is well-developed  He is not diaphoretic  HENT:      Head: Normocephalic and atraumatic  Right Ear: External ear normal       Left Ear: External ear normal       Nose: Nose normal  No congestion  Mouth/Throat:      Mouth: Mucous membranes are moist       Pharynx: Oropharynx is clear  No oropharyngeal exudate  Eyes:      General: No scleral icterus  Pupils: Pupils are equal, round, and reactive to light  Neck:      Thyroid: No thyromegaly  Vascular: No JVD  Trachea: No tracheal deviation  Cardiovascular:      Rate and Rhythm: Normal rate and regular rhythm  Pulses: Normal pulses  no weak pulses          Dorsalis pedis pulses are 2+ on the right side and 2+ on the left side  Posterior tibial pulses are 2+ on the right side and 2+ on the left side  Heart sounds: Normal heart sounds  No murmur heard  No friction rub  Pulmonary:      Effort: Pulmonary effort is normal  No respiratory distress  Breath sounds: Normal breath sounds  No wheezing or rales  Chest:      Chest wall: No tenderness  Abdominal:      General: Bowel sounds are normal  There is no distension  Palpations: Abdomen is soft  Tenderness: There is no abdominal tenderness   There is no right CVA tenderness, left CVA tenderness, guarding or rebound  Hernia: A hernia is present  Hernia is present in the umbilical area  Musculoskeletal:         General: No tenderness  Normal range of motion  Cervical back: Normal range of motion and neck supple  No tenderness  Right lower leg: No edema  Left lower leg: No edema  Feet:      Right foot:      Skin integrity: No ulcer, skin breakdown, erythema, warmth, callus or dry skin  Left foot:      Skin integrity: No ulcer, skin breakdown, erythema, warmth, callus or dry skin  Lymphadenopathy:      Cervical: No cervical adenopathy  Skin:     General: Skin is warm and dry  Neurological:      Mental Status: He is alert and oriented to person, place, and time  Mental status is at baseline  Deep Tendon Reflexes: Reflexes are normal and symmetric  Psychiatric:         Mood and Affect: Mood normal          Behavior: Behavior normal          Thought Content:  Thought content normal          Judgment: Judgment normal

## 2022-08-19 NOTE — ASSESSMENT & PLAN NOTE
Lab Results   Component Value Date    HGBA1C 6 4 (H) 08/16/2022     stable   Fasting BG range: 129-164   Goal A1C <7   Reconciled home medication regimen   Discussed importance of strict carb control diet and weight reduction   Up to date with diabetic eye exam   Diabetic foot exam completed today

## 2022-08-19 NOTE — ASSESSMENT & PLAN NOTE
Patient is due for colonoscopy  Did not follow up with Dr Darvin Hobbs due to logistics  Will refer patient to Dr Emanuel Ramos

## 2022-08-19 NOTE — ASSESSMENT & PLAN NOTE
Wt Readings from Last 3 Encounters:   08/19/22 83 3 kg (183 lb 9 6 oz)   06/14/22 81 8 kg (180 lb 6 4 oz)   02/18/22 83 9 kg (185 lb)       · Patient is educated on the importance of portion control dieting  · discussed the importance of exercise and recommend at least 30 minutes, 5 times weekly  · discussed benefits of weight loss  · patient acknowledges that they understood what is discussed

## 2022-08-19 NOTE — ASSESSMENT & PLAN NOTE
stable  · Had duplex in 5/2022  · Continues to follow with Dr Harriet Greenfield  · Due to follow up in the next couple of months

## 2022-09-29 DIAGNOSIS — I10 BENIGN ESSENTIAL HYPERTENSION: ICD-10-CM

## 2022-09-29 PROCEDURE — 4010F ACE/ARB THERAPY RXD/TAKEN: CPT | Performed by: FAMILY MEDICINE

## 2022-09-29 RX ORDER — LOSARTAN POTASSIUM 50 MG/1
TABLET ORAL
Qty: 90 TABLET | Refills: 3 | Status: SHIPPED | OUTPATIENT
Start: 2022-09-29

## 2022-11-01 DIAGNOSIS — E78.2 MIXED HYPERLIPIDEMIA: ICD-10-CM

## 2022-11-01 DIAGNOSIS — E11.9 DIABETES MELLITUS WITHOUT COMPLICATION (HCC): ICD-10-CM

## 2022-11-01 DIAGNOSIS — F32.A DEPRESSIVE DISORDER: ICD-10-CM

## 2022-11-01 DIAGNOSIS — E78.49 OTHER HYPERLIPIDEMIA: ICD-10-CM

## 2022-11-01 RX ORDER — ATORVASTATIN CALCIUM 20 MG/1
TABLET, FILM COATED ORAL
Qty: 90 TABLET | Refills: 2 | Status: SHIPPED | OUTPATIENT
Start: 2022-11-01

## 2022-11-01 RX ORDER — SERTRALINE HYDROCHLORIDE 100 MG/1
TABLET, FILM COATED ORAL
Qty: 90 TABLET | Refills: 1 | Status: SHIPPED | OUTPATIENT
Start: 2022-11-01

## 2022-11-02 RX ORDER — EZETIMIBE 10 MG/1
TABLET ORAL
Qty: 90 TABLET | Refills: 3 | Status: SHIPPED | OUTPATIENT
Start: 2022-11-02

## 2022-11-16 DIAGNOSIS — G40.909 EPILEPSY DUE TO OLD STROKE (HCC): ICD-10-CM

## 2022-11-16 DIAGNOSIS — I69.398 EPILEPSY DUE TO OLD STROKE (HCC): ICD-10-CM

## 2022-11-16 RX ORDER — LEVETIRACETAM 1000 MG/1
TABLET ORAL
Qty: 180 TABLET | Refills: 0 | Status: SHIPPED | OUTPATIENT
Start: 2022-11-16

## 2022-11-16 NOTE — TELEPHONE ENCOUNTER
Not seen for more than 18 months  No additional refills can come from our office if he is not seen as scheduled in early January 2023

## 2022-11-19 NOTE — PROGRESS NOTES
Daily Note     Today's date: 2018  Patient name: Parish Ruff  : 1960  MRN: [id number]  Referring provider: Noam Chirinos MD  Dx:   Encounter Diagnosis   Name Primary?  Hemiparesis affecting left side as late effect of cerebrovascular accident ([facility] [address] ) Yes                  Subjective: "Im trying to puy my shirt on the regular way  Put it over my head and then put my arms through "      Objective: See treatment diary below  Bioness for 10 min on neuro re-ed mode for muscle facilitation followed by functional training mode focusing on  Grasp/release  IASTM to LUE w/stretch to pronators/supinators  Therex in supine,  AROM: FF, HR add/abd and punch ups, 1#tbar BL integration with 5 sec hold >120*, pronation/supination seated  Assessment: Tolerated treatment well  Pt transferred cones to target using grasp/release function, timing movement to BIONESS function, mod difficulty w/release  G carryover of LUE AROM movement in supine  Discussed carryover of HR add/abd w/pt for tone reduction in pecs to increase ER ROM  Pt  would benefit from continued OT       Plan: Continued skilled OT per POC with focus on posterior strengthening and functional movement to LUE  INTERVENTION COMMENTS:  Diagnosis: [id number]  Precautions: CAD, Depression, Seizure (x1 occurring in 2014), HTN  FOTO: 60, with 90% limitation in Hand function and 37% limitation in UE function    60, with 54% limitation in UE function and 65% limitation in Hand function  Insurance: Payor: Cristiana Allen  REP / Plan: [facility] REP / Product Type: Medicare PPO /   10 of ____ visits, PN due 10/17/2018 PT Treatment Note    NAME:  Kyra Chaevz  DATE: 11/19/22    AGE:   81 y o  Mrn:   [id number]  ADMIT DX:  Primary osteoarthritis of right knee [M17 11]  Performed at least 2 patient identifiers during session: Name and ID bracelet         11/19/22 0958   PT Last Visit   PT Visit Date 11/19/22   Note Type   Note Type Treatment   Pain Assessment   Pain Assessment Tool 0-10   Pain Score 5   Pain Location/Orientation Orientation: Right;Location: Knee   Pain Onset/Description Descriptor: Sore   Restrictions/Precautions   Weight Bearing Precautions Per Order Yes   RLE Weight Bearing Per Order FWB   Other Precautions Chair Alarm;O2;Fall Risk;Pain  (1L/min)   General   Chart Reviewed Yes   Response to Previous Treatment Patient with no complaints from previous session  Family/Caregiver Present No   Cognition   Overall Cognitive Status Impaired   Arousal/Participation Alert   Orientation Level Oriented to person;Disoriented to time;Oriented to place   Memory Decreased recall of recent events   Following Commands Follows one step commands inconsistently   Bed Mobility   Supine to Sit 3  Moderate assistance   Additional items Assist x 1;LE management;Verbal cues; Increased time required;HOB elevated   Sit to Supine 3  Moderate assistance   Additional items Assist x 2;LE management;Verbal cues; Increased time required   Additional Comments pt sat EOB with Min A to maintain posture x 11 mins with weight shifts; pt leaning to the left requring tactile postural support   Transfers   Sit to Stand 2  Maximal assistance   Additional items Assist x 2;Verbal cues; Increased time required   Stand to Sit 3  Moderate assistance   Additional items Assist x 2;Verbal cues; Increased time required   Additional Comments performed multiple STS however pt unable to safely progress to SPS due to R knee buckeling, no putting weight through UEs to support self and posterior lean   Ambulation/Elevation   Gait pattern Improper Weight shift;Decreased foot clearance;Shuffling; Inconsistent eloisa; Excessively slow   Gait Assistance 2  Maximal assist   Additional items Assist x 2;Verbal cues   Assistive Device Rolling walker   Distance 3 side steps   Ambulation/Elevation Additional Comments very having difficulty shifting weight forward and maintained a flexed posture throughout   Balance   Static Sitting Fair -   Dynamic Sitting Poor +   Static Standing Poor   Dynamic Standing Poor   Endurance Deficit   Endurance Deficit Yes   Endurance Deficit Description unable to progress to getting OOB   Activity Tolerance   Activity Tolerance Patient limited by fatigue   Nurse Made Aware KOURTNEY Isbell made aware of treatment findings   Exercises   Quad Sets Supine;10 reps;AROM; Bilateral   Heelslides Supine;10 reps;AAROM; Right;AROM; Left   Hip Flexion Supine;10 reps;AAROM; Right;AROM; Left   Hip Adduction Supine;10 reps;AROM; Bilateral   Ankle Pumps Supine;10 reps;AROM; Bilateral   Assessment   Prognosis Guarded   Assessment Pt seen for PT treatment session this date with interventions consisting of therapeutic exercise to improve strength to improve functional mobility and therapeutic activity to improve transfers and increase activity tolerance with functional mobility to decrease fall risk  Pt agreeable to PT treatment session upon arrival, pt found supine in bed, in no apparent distress  Since previous session, pt has made no progress as evidenced by requiring increased amount of assistance with mobility  Barriers during this session include confusion and fatigue  Pt continues to be functioning below baseline level, and remains limited 2* factors listed above and including decreased strength, impaired activity tolerance, decreased balance and pain  Pt prognosis for achieving goals is fair, pending pt progress with hospitalization/medical status improvements, and indicated by static poor cognition and continued required assistance   PT will continue to see pt during current hospitalization in order to address the deficits listed above and provide interventions consistent w/ POC in effort to achieve goals  Current goals and POC remain appropriate, pt continues to have rehab potential  Continue to recommend post acute rehabilitation services at time of d/c in order to maximize pt's functional independence and safety w/ mobility  Upon conclusion pt supine in bed  The patient's AM-PAC Basic Mobility Inpatient Short Form Raw Score is 10  A Raw score of less than or equal to 16 suggests the patient may benefit from discharge to post-acute rehabilitation services  Please also refer to the recommendation of the Physical Therapist for safe discharge planning  This session, pt required and most appropriately benefited from skilled OT/PT co-treat due to extensive physical assistance of SKILLED therapists, significant regression from functional baseline and decreased activity tolerance  OT and PT goals were addressed separately as seen in documentation     Goals   Patient Goals to do better than last time   PT Treatment Day 4   Plan   Progress Slow progress, cognitive deficits   Recommendation   PT Discharge Recommendation Post acute rehabilitation services   AM-PAC Basic Mobility Inpatient   Turning in Bed Without Bedrails 4   Lying on Back to Sitting on Edge of Flat Bed 2   Moving Bed to Chair 1   Standing Up From Chair 1   Walk in Room 1   Climb 3-5 Stairs 1   Basic Mobility Inpatient Raw Score 10   Highest Level Of Mobility   JH-HLM Goal 4: Move to chair/commode   JH-HLM Achieved 3: Sit at edge of bed     Time In: 0958  Time Out: 1038  Total Treatment Minutes: [address], PT

## 2022-11-23 ENCOUNTER — HOSPITAL ENCOUNTER (OUTPATIENT)
Dept: NON INVASIVE DIAGNOSTICS | Facility: CLINIC | Age: 62
Discharge: HOME | End: 2022-11-23

## 2022-11-23 DIAGNOSIS — I65.23 CAROTID STENOSIS, BILATERAL: ICD-10-CM

## 2023-01-06 ENCOUNTER — OFFICE VISIT (OUTPATIENT)
Dept: NEUROLOGY | Facility: CLINIC | Age: 63
End: 2023-01-06

## 2023-01-06 VITALS
DIASTOLIC BLOOD PRESSURE: 61 MMHG | HEART RATE: 70 BPM | WEIGHT: 178.7 LBS | SYSTOLIC BLOOD PRESSURE: 128 MMHG | BODY MASS INDEX: 26.78 KG/M2

## 2023-01-06 DIAGNOSIS — I69.398 ALTERATIONS OF SENSATIONS FOLLOWING CVA (CEREBROVASCULAR ACCIDENT): ICD-10-CM

## 2023-01-06 DIAGNOSIS — I69.398 EPILEPSY DUE TO OLD STROKE (HCC): Primary | ICD-10-CM

## 2023-01-06 DIAGNOSIS — I69.354 HEMIPARESIS AFFECTING LEFT SIDE AS LATE EFFECT OF CEREBROVASCULAR ACCIDENT (HCC): ICD-10-CM

## 2023-01-06 DIAGNOSIS — I69.398 ALTERATIONS OF SENSATIONS FOLLOWING CVA (CEREBROVASCULAR ACCIDENT): Primary | ICD-10-CM

## 2023-01-06 DIAGNOSIS — G40.909 EPILEPSY DUE TO OLD STROKE (HCC): Primary | ICD-10-CM

## 2023-01-06 DIAGNOSIS — R20.9 ALTERATIONS OF SENSATIONS FOLLOWING CVA (CEREBROVASCULAR ACCIDENT): ICD-10-CM

## 2023-01-06 DIAGNOSIS — R20.9 ALTERATIONS OF SENSATIONS FOLLOWING CVA (CEREBROVASCULAR ACCIDENT): Primary | ICD-10-CM

## 2023-01-06 NOTE — PATIENT INSTRUCTIONS
- Continue levetiracetam 1000 mg twice per day for now  - Have your EEG done   - If this looks good, we can cut back on keppra to 500 mg twice per day prior to coming off of the medication  - If we do end up decreasing the medications, if you are ever driving and get left sided jerking you need to pull over right away and turn the car off until it goes away   If you are at home, lie down in a safe place    - Call the office with seizures or concerns  - Follow up in 6 months with Dr Lira May

## 2023-01-06 NOTE — PROGRESS NOTES
Patient ID: Rosiland Carrel is a 63 y o  male with epilepsy due to right MCA stroke manifest as a single seizure, who is returning to Neurology office for follow up of his seizures  Assessment/Plan:    Epilepsy due to old stroke [facility])  Patient has continued to be seizure free since single seizure in 2014 in the setting of right MCA stroke about 11 months prior  He has continued on levetiracetam 1000 mg BID without side effects or concerns  He is interested in coming of of medication at some point in the future  Reviewed that given his prior stroke, he is likely at risk for recurrent seizures and given that he does drive this is somewhat concerning  We discussed the possibility of cutting back on his dose of levetiracetam to 500 mg BID for some time to see how he does  Prior to this, he will have an EEG done to ensure there is no clear underlying seizure tendency (sharps, spikes, etc )  Discussed if we do decrease his dose and he has and episodes of left sided jerking while driving, he needs to pull over immediately and turn off the vehicle  If he is at home or another place and has this, he should get to a safe place, lie down if able in the event that the jerking generalizes into a larger seizure  Patient will call the office with breakthrough seizures or concerns  Follow up in 6 months or sooner if needed  Continue 1000 mg BID of levetiracetam until results of EEG are reviewed  Hemiparesis affecting left side as late effect of cerebrovascular accident [facility])  Chronic s/p right MCA stroke, no change to baseline  He will Return in about 6 months (around 7/6/2023) for Follow up with Dr Apolonia Horowitz  Subjective:  Rosiland Carrel is a 63 y o  male with epilepsy due to right MCA stroke manifest as a single seizure, who is returning to Neurology office for follow up of his seizures  He was last seen in the office on 4/26/21   At that time, noted that he was previously left handed but due to stroke there is chronic spastic hemiparesis arm>leg  Seizures were completely controlled on levetiracetam, single seizure occurred in 2014, 11 months after his stroke  Bilateral carotid disease was followed by vascular surgery, recommended continuing with medical management and surveillance duplex/imaging  He was to follow up in 6 months  Since their last visit, he has continued to be seizure free  Continues on levetiracetam 1000 mg BID  No side effects  He would like to come off of medications  Discussed risk of seizures  Agreeable to EEG prior ot changing dose  Would even be willing to just cut dose back to 500 mg BID for a while to see how he does  Continues to drive  Remembers with his single seizure he had left sided jerking first  Feels that he would be able to pull over or sit down to prevent accident or injury  Lives by himself and is able to take care of everything on his own  Current seizure medications:  - levetiracetam 1000 mg BID  Other medications as per Epic  Current AEDs:  Keppra 1000mg bid  Medication side effects: None  Medication adherence: Yes     Event/Seizure semiology:  Left sided arm and leg shaking     Special Features  Status epilepticus: no  Self Injury Seizures: no  Precipitating Factors: None reported     Epilepsy Risk Factors:  Stroke     Prior AEDs:  Levetiracetam     Prior Evaluation:  EEG 11/4/20:  Clinical Interpretation: This abnormal study is consistent with mild generalized non-specific cerebral dysfunction  No electrographic seizures, interictal epileptiform discharges (seizure tendency) or focal slowing were seen  No diagnostic clinical events are captured  CT shows chronic large R MCA stroke (personally reviewed 10/28/2020)    VAS carotid study 11/23/22:  Impression  RIGHT:  There is known occlusion of the internal carotid artery  Elevated velocities noted in the proximal external carotid artery consistent  with moderate stenosis    Vertebral artery flow is antegrade  There is no significant subclavian artery  disease  LEFT:  Attenuated waveforms are noted throughout the common carotid artery suggesting  more proximal disease  There is 50-69% stenosis in the internal carotid artery  Plaque is heterogenous  and irregular  Vertebral artery flow is antegrade  There is no significant subclavian artery  disease      Psychiatric History:  Anxiety: Sertraline and Lorazepam  Depression: Sertraline     Social History:   Driving: Yes  Lives [place] at home with his son  Zak Oshea      I reviewed prior neurology notes, most recent labs, as documented in Epic/[facility], and summarized above  Objective:    Blood pressure 128/61, pulse 70, weight 81 1 kg (178 lb 11 2 oz)  Physical Exam  No apparent distress  Appears well nourished  Mood appropriate for situation     Neurologic Exam  Mental status- alert and oriented to person, place, and time  Speech appropriate for conversation  Good attention and knowledge  Cranial Nerves- PERRL, VFFTC, EOMS normal, facial sensation and muscles symmetric, hearing intact bilaterally to finger rubs, tongue midline, palate rise symmetrical, shoulder shrug symmetrical     Motor- left spastic hemiparesis arm (distal>proximal) > leg  Sensory-  Intact distally in all extremities to light touch  DTRs- hyper reflexive in left upper and lower extremities    Gait-spastic, hemiparetic gait  Coordination- FNF intact on right        ROS:  Review of Systems   Constitutional: Negative  Negative for appetite change and fever  HENT: Negative  Negative for hearing loss, tinnitus, trouble swallowing and voice change  Eyes: Negative  Negative for photophobia, pain and visual disturbance  Respiratory: Negative  Negative for shortness of breath  Cardiovascular: Negative  Negative for palpitations  Gastrointestinal: Negative  Negative for nausea and vomiting  Endocrine: Negative  Negative for cold intolerance  Genitourinary: Negative  Negative for dysuria, frequency and urgency  Musculoskeletal: Negative  Negative for gait problem, myalgias and neck pain  Skin: Negative  Negative for rash  Allergic/Immunologic: Negative  Neurological: Negative  Negative for dizziness, tremors, seizures, syncope, facial asymmetry, speech difficulty, weakness, light-headedness, numbness and headaches  Hematological: Negative  Does not bruise/bleed easily  Psychiatric/Behavioral: Negative  Negative for confusion, hallucinations and sleep disturbance  All other systems reviewed and are negative  ROS obtained by MA and reviewed by myself  This note may have been created using voice recognition software  There may be unintentional errors such as grammatical errors, spelling errors, or pronoun errors

## 2023-02-14 NOTE — ASSESSMENT & PLAN NOTE
Patient has continued to be seizure free since single seizure in 2014 in the setting of right MCA stroke about 11 months prior  He has continued on levetiracetam 1000 mg BID without side effects or concerns  He is interested in coming of of medication at some point in the future  Reviewed that given his prior stroke, he is likely at risk for recurrent seizures and given that he does drive this is somewhat concerning  We discussed the possibility of cutting back on his dose of levetiracetam to 500 mg BID for some time to see how he does  Prior to this, he will have an EEG done to ensure there is no clear underlying seizure tendency (sharps, spikes, etc )  Discussed if we do decrease his dose and he has and episodes of left sided jerking while driving, he needs to pull over immediately and turn off the vehicle  If he is at home or another place and has this, he should get to a safe place, lie down if able in the event that the jerking generalizes into a larger seizure  Patient will call the office with breakthrough seizures or concerns  Follow up in 6 months or sooner if needed  Continue 1000 mg BID of levetiracetam until results of EEG are reviewed

## 2023-02-15 ENCOUNTER — APPOINTMENT (OUTPATIENT)
Dept: LAB | Facility: HOSPITAL | Age: 63
End: 2023-02-15

## 2023-02-15 DIAGNOSIS — E78.2 MIXED HYPERLIPIDEMIA: ICD-10-CM

## 2023-02-15 DIAGNOSIS — E11.59 TYPE 2 DIABETES MELLITUS WITH OTHER CIRCULATORY COMPLICATION, WITHOUT LONG-TERM CURRENT USE OF INSULIN (HCC): ICD-10-CM

## 2023-02-15 LAB
ALBUMIN SERPL BCP-MCNC: 4.3 G/DL (ref 3.5–5)
ALP SERPL-CCNC: 59 U/L (ref 34–104)
ALT SERPL W P-5'-P-CCNC: 15 U/L (ref 7–52)
ANION GAP SERPL CALCULATED.3IONS-SCNC: 6 MMOL/L (ref 4–13)
AST SERPL W P-5'-P-CCNC: 12 U/L (ref 13–39)
BILIRUB SERPL-MCNC: 0.52 MG/DL (ref 0.2–1)
BUN SERPL-MCNC: 12 MG/DL (ref 5–25)
CALCIUM SERPL-MCNC: 10.3 MG/DL (ref 8.4–10.2)
CHLORIDE SERPL-SCNC: 104 MMOL/L (ref 96–108)
CHOLEST SERPL-MCNC: 128 MG/DL
CO2 SERPL-SCNC: 29 MMOL/L (ref 21–32)
CREAT SERPL-MCNC: 0.53 MG/DL (ref 0.6–1.3)
EST. AVERAGE GLUCOSE BLD GHB EST-MCNC: 123 MG/DL
GFR SERPL CREATININE-BSD FRML MDRD: 114 ML/MIN/1.73SQ M
GLUCOSE P FAST SERPL-MCNC: 129 MG/DL (ref 65–99)
HBA1C MFR BLD: 5.9 %
HDLC SERPL-MCNC: 41 MG/DL
LDLC SERPL CALC-MCNC: 55 MG/DL (ref 0–100)
NONHDLC SERPL-MCNC: 87 MG/DL
POTASSIUM SERPL-SCNC: 4.5 MMOL/L (ref 3.5–5.3)
PROT SERPL-MCNC: 6.6 G/DL (ref 6.4–8.4)
SODIUM SERPL-SCNC: 139 MMOL/L (ref 135–147)
TRIGL SERPL-MCNC: 159 MG/DL

## 2023-02-17 ENCOUNTER — HOSPITAL ENCOUNTER (OUTPATIENT)
Dept: NEUROLOGY | Facility: CLINIC | Age: 63
End: 2023-02-17

## 2023-02-17 DIAGNOSIS — G40.909 EPILEPSY DUE TO OLD STROKE (HCC): ICD-10-CM

## 2023-02-17 DIAGNOSIS — I69.398 EPILEPSY DUE TO OLD STROKE (HCC): ICD-10-CM

## 2023-02-20 ENCOUNTER — TELEPHONE (OUTPATIENT)
Dept: NEUROLOGY | Facility: CLINIC | Age: 63
End: 2023-02-20

## 2023-02-20 NOTE — TELEPHONE ENCOUNTER
----- Message from Alma 62 sent at 2/17/2023  5:13 PM EST -----  Please let patient know that his EEG does not show any epileptiform discharges/ seizure tendency   If he would still like to decrease his levetiracetam dose we can do that, he just needs to let us know    ----- Message -----  From: Keyshawn Lacey MD  Sent: 2/17/2023   5:07 PM EST  To: FELICE Bell

## 2023-02-22 ENCOUNTER — RA CDI HCC (OUTPATIENT)
Dept: OTHER | Facility: HOSPITAL | Age: 63
End: 2023-02-22

## 2023-02-23 NOTE — PROGRESS NOTES
[facility]  coding opportunities     E11 [id number], I73 9, E11 40     Chart Reviewed number of suggestions sent to Provider: 3     Patients Insurance     Medicare Insurance: [address] [place] [address]

## 2023-02-24 ENCOUNTER — OFFICE VISIT (OUTPATIENT)
Dept: FAMILY MEDICINE CLINIC | Facility: CLINIC | Age: 63
End: 2023-02-24

## 2023-02-24 VITALS
BODY MASS INDEX: 27.02 KG/M2 | DIASTOLIC BLOOD PRESSURE: 70 MMHG | SYSTOLIC BLOOD PRESSURE: 124 MMHG | OXYGEN SATURATION: 98 % | HEART RATE: 92 BPM | TEMPERATURE: 96.4 F | WEIGHT: 182.4 LBS | HEIGHT: 69 IN

## 2023-02-24 DIAGNOSIS — Z12.5 SCREENING FOR PROSTATE CANCER: ICD-10-CM

## 2023-02-24 DIAGNOSIS — F32.A DEPRESSIVE DISORDER: ICD-10-CM

## 2023-02-24 DIAGNOSIS — G40.909 EPILEPSY DUE TO OLD STROKE (HCC): ICD-10-CM

## 2023-02-24 DIAGNOSIS — F32.4 MAJOR DEPRESSIVE DISORDER WITH SINGLE EPISODE, IN PARTIAL REMISSION (HCC): ICD-10-CM

## 2023-02-24 DIAGNOSIS — F17.299 OTHER TOBACCO PRODUCT NICOTINE DEPENDENCE WITH NICOTINE-INDUCED DISORDER: ICD-10-CM

## 2023-02-24 DIAGNOSIS — N40.0 BENIGN PROSTATIC HYPERPLASIA WITHOUT LOWER URINARY TRACT SYMPTOMS: ICD-10-CM

## 2023-02-24 DIAGNOSIS — E78.2 MIXED HYPERLIPIDEMIA: ICD-10-CM

## 2023-02-24 DIAGNOSIS — I69.30 HISTORY OF CEREBROVASCULAR ACCIDENT WITH RESIDUAL DEFICIT: ICD-10-CM

## 2023-02-24 DIAGNOSIS — L30.9 ECZEMA, UNSPECIFIED TYPE: ICD-10-CM

## 2023-02-24 DIAGNOSIS — I10 BENIGN ESSENTIAL HYPERTENSION: Primary | ICD-10-CM

## 2023-02-24 DIAGNOSIS — E11.59 TYPE 2 DIABETES MELLITUS WITH OTHER CIRCULATORY COMPLICATION, WITHOUT LONG-TERM CURRENT USE OF INSULIN (HCC): ICD-10-CM

## 2023-02-24 DIAGNOSIS — I65.23 CAROTID STENOSIS, BILATERAL: ICD-10-CM

## 2023-02-24 DIAGNOSIS — Z23 ENCOUNTER FOR VACCINATION: ICD-10-CM

## 2023-02-24 DIAGNOSIS — I69.398 EPILEPSY DUE TO OLD STROKE (HCC): ICD-10-CM

## 2023-02-24 RX ORDER — TRIAMCINOLONE ACETONIDE 1 MG/G
CREAM TOPICAL 2 TIMES DAILY
Qty: 30 G | Refills: 0 | Status: SHIPPED | OUTPATIENT
Start: 2023-02-24

## 2023-02-24 NOTE — PATIENT INSTRUCTIONS
Schedule vascular follow up  Medicare Preventive Visit Patient Instructions  Thank you for completing your Welcome to Medicare Visit or Medicare Annual Wellness Visit today  Your next wellness visit will be due in one year (2/25/2024)  The screening/preventive services that you may require over the next 5-10 years are detailed below  Some tests may not apply to you based off risk factors and/or age  Screening tests ordered at today's visit but not completed yet may show as past due  Also, please note that scanned in results may not display below  Preventive Screenings:  Service Recommendations Previous Testing/Comments   Colorectal Cancer Screening  Colonoscopy    Fecal Occult Blood Test (FOBT)/Fecal Immunochemical Test (FIT)  Fecal DNA/Cologuard Test  Flexible Sigmoidoscopy Age: 39-70 years old   Colonoscopy: every 10 years (May be performed more frequently if at higher risk)  OR  FOBT/FIT: every 1 year  OR  Cologuard: every 3 years  OR  Sigmoidoscopy: every 5 years  Screening may be recommended earlier than age 39 if at higher risk for colorectal cancer  Also, an individualized decision between you and your healthcare provider will decide whether screening between the ages of 74-80 would be appropriate   Colonoscopy: 10/27/2016  FOBT/FIT: Not on file  Cologuard: Not on file  Sigmoidoscopy: Not on file    Screening Current     Prostate Cancer Screening Individualized decision between patient and health care provider in men between ages of 53-78   Medicare will cover every 12 months beginning on the day after your 50th birthday PSA: 1 5 ng/mL           Hepatitis C Screening Once for adults born between 1945 and 1965  More frequently in patients at high risk for Hepatitis C Hep C Antibody: 11/26/2018    Screening Current   Diabetes Screening 1-2 times per year if you're at risk for diabetes or have pre-diabetes Fasting glucose: 129 mg/dL (2/15/2023)  A1C: 5 9 % (2/15/2023)  Screening Not Indicated  History Diabetes   Cholesterol Screening Once every 5 years if you don't have a lipid disorder  May order more often based on risk factors  Lipid panel: 02/15/2023  Screening Not Indicated  History Lipid Disorder      Other Preventive Screenings Covered by Medicare:  Abdominal Aortic Aneurysm (AAA) Screening: covered once if your at risk  You're considered to be at risk if you have a family history of AAA or a male between the age of 73-68 who smoking at least 100 cigarettes in your lifetime  Lung Cancer Screening: covers low dose CT scan once per year if you meet all of the following conditions: (1) Age 50-69; (2) No signs or symptoms of lung cancer; (3) Current smoker or have quit smoking within the last 15 years; (4) You have a tobacco smoking history of at least 20 pack years (packs per day x number of years you smoked); (5) You get a written order from a healthcare provider  Glaucoma Screening: covered annually if you're considered high risk: (1) You have diabetes OR (2) Family history of glaucoma OR (3)  aged 48 and older OR (3)  American aged 72 and older  Osteoporosis Screening: covered every 2 years if you meet one of the following conditions: (1) Have a vertebral abnormality; (2) On glucocorticoid therapy for more than 3 months; (3) Have primary hyperparathyroidism; (4) On osteoporosis medications and need to assess response to drug therapy  HIV Screening: covered annually if you're between the age of 12-76  Also covered annually if you are younger than 13 and older than 72 with risk factors for HIV infection  For pregnant patients, it is covered up to 3 times per pregnancy      Immunizations:  Immunization Recommendations   Influenza Vaccine Annual influenza vaccination during flu season is recommended for all persons aged >= 6 months who do not have contraindications   Pneumococcal Vaccine   * Pneumococcal conjugate vaccine = PCV13 (Prevnar 13), PCV15 (Vaxneuvance), PCV20 (Prevnar 20)  * Pneumococcal polysaccharide vaccine = PPSV23 (Pneumovax) Adults 2364 years old: 1-3 doses may be recommended based on certain risk factors  Adults 72 years old: 1-2 doses may be recommended based off what pneumonia vaccine you previously received   Hepatitis B Vaccine 3 dose series if at intermediate or high risk (ex: diabetes, end stage renal disease, liver disease)   Tetanus (Td) Vaccine - COST NOT COVERED BY MEDICARE PART B Following completion of primary series, a booster dose should be given every 10 years to maintain immunity against tetanus  Td may also be given as tetanus wound prophylaxis  Tdap Vaccine - COST NOT COVERED BY MEDICARE PART B Recommended at least once for all adults  For pregnant patients, recommended with each pregnancy  Shingles Vaccine (Shingrix) - COST NOT COVERED BY MEDICARE PART B  2 shot series recommended in those aged 48 and above     Health Maintenance Due:      Topic Date Due    Colorectal Cancer Screening  02/24/2024 (Originally 5/28/2007)    HIV Screening  Completed    Hepatitis C Screening  Completed     Immunizations Due:      Topic Date Due    Hepatitis A Vaccine (1 of 2 - Risk 2-dose series) Never done    Pneumococcal Vaccine: Pediatrics (0 to 5 Years) and At-Risk Patients (6 to 59 Years) (2 - PCV) 01/22/2021    COVID-19 Vaccine (4 - Booster for Moderna series) 03/02/2022    Hepatitis B Vaccine (1 of 3 - Risk 3-dose series) Never done    Influenza Vaccine (1) 09/01/2022     Advance Directives   What are advance directives? Advance directives are legal documents that state your wishes and plans for medical care  These plans are made ahead of time in case you lose your ability to make decisions for yourself  Advance directives can apply to any medical decision, such as the treatments you want, and if you want to donate organs  What are the types of advance directives? There are many types of advance directives, and each state has rules about how to use them   You may choose a combination of any of the following:  Living will: This is a written record of the treatment you want  You can also choose which treatments you do not want, which to limit, and which to stop at a certain time  This includes surgery, medicine, IV fluid, and tube feedings  Durable power of  for healthcare [facility]): This is a written record that states who you want to make healthcare choices for you when you are unable to make them for yourself  This person, called a proxy, is usually a family member or a friend  You may choose more than 1 proxy  Do not resuscitate (DNR) order:  A DNR order is used in case your heart stops beating or you stop breathing  It is a request not to have certain forms of treatment, such as CPR  A DNR order may be included in other types of advance directives  Medical directive: This covers the care that you want if you are in a coma, near death, or unable to make decisions for yourself  You can list the treatments you want for each condition  Treatment may include pain medicine, surgery, blood transfusions, dialysis, IV or tube feedings, and a ventilator (breathing machine)  Values history: This document has questions about your views, beliefs, and how you feel and think about life  This information can help others choose the care that you would choose  Why are advance directives important? An advance directive helps you control your care  Although spoken wishes may be used, it is better to have your wishes written down  Spoken wishes can be misunderstood, or not followed  Treatments may be given even if you do not want them  An advance directive may make it easier for your family to make difficult choices about your care  Cigarette Smoking and Your Health   Risks to your health if you smoke:  Nicotine and other chemicals found in tobacco damage every cell in your body   Even if you are a light smoker, you have an increased risk for cancer, heart disease, and lung disease  If you are pregnant or have diabetes, smoking increases your risk for complications  Benefits to your health if you stop smoking: You decrease respiratory symptoms such as coughing, wheezing, and shortness of breath  You reduce your risk for cancers of the lung, mouth, throat, kidney, bladder, pancreas, stomach, and cervix  If you already have cancer, you increase the benefits of chemotherapy  You also reduce your risk for cancer returning or a second cancer from developing  You reduce your risk for heart disease, blood clots, heart attack, and stroke  You reduce your risk for lung infections, and diseases such as pneumonia, asthma, chronic bronchitis, and emphysema  Your circulation improves  More oxygen can be delivered to your body  If you have diabetes, you lower your risk for complications, such as kidney, artery, and eye diseases  You also lower your risk for nerve damage  Nerve damage can lead to amputations, poor vision, and blindness  You improve your body's ability to heal and to fight infections  For more information and support to stop smoking:   [employer]  Phone: 9- 067 - 185-6815  Web Address: [employer]  How to Quit Using Smokeless Tobacco   Why it is important to stop using smokeless tobacco:  Smokeless tobacco comes in many forms  Examples include chew, snuff, dip, dissolvable tobacco, and snus  All smokeless tobacco products contain nicotine and may contain as much nicotine as 3 cigarettes  You may be physically dependent on nicotine  You may also be emotionally addicted to it  The cravings can be strong, but it is important to quit using smokeless tobacco  You will improve your health and decrease your cancer, stroke, and heart attack risk  Mouth sores and tooth problems will also improve when you quit   You can benefit from quitting no matter how long you have used smokeless tobacco    Prepare to stop using smokeless tobacco:  Nicotine is a highly addictive drug  Withdrawal symptoms can happen when you stop and make it hard to quit  The following can help keep you on track:  Set a quit date  Tell friends, family, and coworkers that you plan to quit  Remove all smokeless tobacco products from your home, car, and workplace  Manage weight gain after you quit:  Nicotine can affect your metabolism  You may gain a few pounds after you quit  The following can help you control your weight:  Eat healthy foods  Drink water before, during, and between meals  Exercise as directed  Weight Management   Why it is important to manage your weight:  Being overweight increases your risk of health conditions such as heart disease, high blood pressure, type 2 diabetes, and certain types of cancer  It can also increase your risk for osteoarthritis, sleep apnea, and other respiratory problems  Aim for a slow, steady weight loss  Even a small amount of weight loss can lower your risk of health problems  How to lose weight safely:  A safe and healthy way to lose weight is to eat fewer calories and get regular exercise  You can lose up about 1 pound a week by decreasing the number of calories you eat by 500 calories each day  Healthy meal plan for weight management:  A healthy meal plan includes a variety of foods, contains fewer calories, and helps you stay healthy  A healthy meal plan includes the following:  Eat whole-grain foods more often  A healthy meal plan should contain fiber  Fiber is the part of grains, fruits, and vegetables that is not broken down by your body  Whole-grain foods are healthy and provide extra fiber in your diet  Some examples of whole-grain foods are whole-wheat breads and pastas, oatmeal, brown rice, and bulgur  Eat a variety of vegetables every day  Include dark, leafy greens such as spinach, kale, ney greens, and mustard greens  Eat yellow and orange vegetables such as carrots, sweet potatoes, and winter squash     Eat a variety of fruits every day  Choose fresh or canned fruit (canned in its own juice or light syrup) instead of juice  Fruit juice has very little or no fiber  Eat low-fat dairy foods  Drink fat-free (skim) milk or 1% milk  Eat fat-free yogurt and low-fat cottage cheese  Try low-fat cheeses such as mozzarella and other reduced-fat cheeses  Choose meat and other protein foods that are low in fat  Choose beans or other legumes such as split peas or lentils  Choose fish, skinless poultry (chicken or turkey), or lean cuts of red meat (beef or pork)  Before you cook meat or poultry, cut off any visible fat  Use less fat and oil  Try baking foods instead of frying them  Add less fat, such as margarine, sour cream, regular salad dressing and mayonnaise to foods  Eat fewer high-fat foods  Some examples of high-fat foods include french fries, doughnuts, ice cream, and cakes  Eat fewer sweets  Limit foods and drinks that are high in sugar  This includes candy, cookies, regular soda, and sweetened drinks  Exercise:  Exercise at least 30 minutes per day on most days of the week  Some examples of exercise include walking, biking, dancing, and swimming  You can also fit in more physical activity by taking the stairs instead of the elevator or parking farther away from stores  Ask your healthcare provider about the best exercise plan for you  © Copyright [employer] 2018 Information is for End User's use only and may not be sold, redistributed or otherwise used for commercial purposes   All illustrations and images included in CareNotes® are the copyrighted property of [employer]  or [address] [place] [address]

## 2023-02-24 NOTE — ASSESSMENT & PLAN NOTE
Stable  · PSA has remained 1/2-1 9  · No new lower urinary tract symptoms  · Follow up screening psa

## 2023-02-24 NOTE — ASSESSMENT & PLAN NOTE
· Patient was unable to quit since our last visit  · Encouraged complete cessation  · Smokes about 4 cigars daily

## 2023-02-24 NOTE — ASSESSMENT & PLAN NOTE
Stable  · 2/20/2023: EEG = no epileptiform dischares/seizure tendency  · Patient was told he may decrease his levetiracetam but patient will wait to speak with neurology in 8/2023

## 2023-05-01 DIAGNOSIS — F32.A DEPRESSIVE DISORDER: ICD-10-CM

## 2023-05-01 RX ORDER — SERTRALINE HYDROCHLORIDE 100 MG/1
TABLET, FILM COATED ORAL
Qty: 90 TABLET | Refills: 1 | Status: SHIPPED | OUTPATIENT
Start: 2023-05-01

## 2023-05-02 DIAGNOSIS — E11.59 TYPE 2 DIABETES MELLITUS WITH OTHER CIRCULATORY COMPLICATION, WITHOUT LONG-TERM CURRENT USE OF INSULIN (HCC): ICD-10-CM

## 2023-05-02 RX ORDER — BLOOD SUGAR DIAGNOSTIC
1 STRIP MISCELLANEOUS DAILY
Qty: 100 STRIP | Refills: 3 | Status: SHIPPED | OUTPATIENT
Start: 2023-05-02

## 2023-06-14 ENCOUNTER — OFFICE VISIT (OUTPATIENT)
Dept: FAMILY MEDICINE CLINIC | Facility: CLINIC | Age: 63
End: 2023-06-14
Payer: COMMERCIAL

## 2023-06-14 VITALS
OXYGEN SATURATION: 96 % | WEIGHT: 181.8 LBS | DIASTOLIC BLOOD PRESSURE: 72 MMHG | HEART RATE: 83 BPM | TEMPERATURE: 97.6 F | BODY MASS INDEX: 26.93 KG/M2 | HEIGHT: 69 IN | SYSTOLIC BLOOD PRESSURE: 120 MMHG

## 2023-06-14 DIAGNOSIS — K59.00 CONSTIPATION, UNSPECIFIED CONSTIPATION TYPE: Primary | ICD-10-CM

## 2023-06-14 PROCEDURE — 99213 OFFICE O/P EST LOW 20 MIN: CPT

## 2023-06-14 RX ORDER — BISACODYL 10 MG
10 SUPPOSITORY, RECTAL RECTAL DAILY
Qty: 12 SUPPOSITORY | Refills: 0 | Status: SHIPPED | OUTPATIENT
Start: 2023-06-14

## 2023-06-14 RX ORDER — SENNA AND DOCUSATE SODIUM 50; 8.6 MG/1; MG/1
1 TABLET, FILM COATED ORAL DAILY
Qty: 7 TABLET | Refills: 0 | Status: SHIPPED | OUTPATIENT
Start: 2023-06-14

## 2023-06-14 NOTE — PATIENT INSTRUCTIONS
Constipation   WHAT YOU NEED TO KNOW:   Constipation means you have hard, dry bowel movements, or you go longer than usual between bowel movements  DISCHARGE INSTRUCTIONS:   Call your doctor if:   You have blood in your bowel movements  You have a fever and abdominal pain with the constipation  Your constipation gets worse  You start to vomit  You have questions or concerns about your condition or care  Medicines:   Medicine  such as a laxative may help relax and loosen your intestines to help you have a bowel movement  Your provider may recommend you only use laxatives for a short time  Long-term use may make your bowels dependent on the medicine  Take your medicine as directed  Contact your healthcare provider if you think your medicine is not helping or if you have side effects  Tell your provider if you are allergic to any medicine  Keep a list of the medicines, vitamins, and herbs you take  Include the amounts, and when and why you take them  Bring the list or the pill bottles to follow-up visits  Carry your medicine list with you in case of an emergency  Relieve constipation:   A suppository  may be used to help soften your bowel movements  This may make them easier to pass  A suppository is guided into your rectum through your anus  An enema  is liquid medicine used to clear bowel movement from your rectum  The medicine is put into your rectum through your anus  Prevent constipation:   Drink liquids as directed  You may need to drink extra liquids to help soften and move your bowels  Ask how much liquid to drink each day and which liquids are best for you  Eat high-fiber foods  This may help decrease constipation by adding bulk to your bowel movements  High-fiber foods include fruit, vegetables, whole-grain breads and cereals, and beans  Your healthcare provider or dietitian can help you create a high-fiber meal plan   Your provider may also recommend a fiber supplement if you cannot get enough fiber from food  Exercise regularly  Regular physical activity can help stimulate your intestines  Walking is a good exercise to prevent or relieve constipation  Ask which exercises are best for you  Schedule a time each day to have a bowel movement  This may help train your body to have regular bowel movements  Bend forward while you are on the toilet to help move the bowel movement out  Sit on the toilet for at least 10 minutes, even if you do not have a bowel movement  Talk to your healthcare provider about your medicines  Certain medicines, such as opioids, can cause constipation  Your provider may be able to make medicine changes  For example, he or she may change the kind of medicine, or change when you take it  Follow up with your doctor as directed:  Write down your questions so you remember to ask them during your visits  © Copyright [employer] 2022 Information is for End User's use only and may not be sold, redistributed or otherwise used for commercial purposes  The above information is an  only  It is not intended as medical advice for individual conditions or treatments  Talk to your doctor, nurse or pharmacist before following any medical regimen to see if it is safe and effective for you

## 2023-06-14 NOTE — PROGRESS NOTES
Name: Hannah Walls      : 1960      MRN: [id number]  Encounter Provider: FELICE Ramirez  Encounter Date: 2023   Encounter department: [address] [place] [address]    Assessment & Plan     1  Constipation, unspecified constipation type  Comments:  1 week onset  Only able to pass small amounts of stool  Increase fluids and fiber in diet  Senokot and Dulcolax sent to pharmacy  Orders:  -     senna-docusate sodium (SENOKOT-S) 8 6-50 mg per tablet; Take 1 tablet by mouth daily  -     bisacodyl (DULCOLAX) 10 mg suppository; Insert 1 suppository (10 mg total) into the rectum daily           Subjective      Constipation  This is a new problem  The current episode started in the past 7 days  The problem is unchanged  His stool frequency is 1 time per week or less  The stool is described as formed  He does not have bowel incontinence  He does not have bladder incontinence  He has not had a urinary tract infection  He has a low fiber diet  Daily fruit servings: poor intake  Daily vegetable servings: poor intake  He does not exercise regularly  He has adequate water intake  Pertinent negatives include no abdominal pain, back pain, bloating, diarrhea, difficulty urinating, fecal incontinence, fever, nausea, rectal pain or vomiting  Pain is described as aching  Past treatments include diet changes  The treatment provided no relief  He has been eating and drinking normally  Review of Systems   Constitutional: Negative for activity change, chills, fatigue and fever  HENT: Negative for congestion, ear pain, rhinorrhea and sore throat  Eyes: Negative for pain and visual disturbance  Respiratory: Negative for cough, chest tightness and shortness of breath  Cardiovascular: Negative for chest pain, palpitations and leg swelling  Gastrointestinal: Positive for constipation  Negative for abdominal pain, bloating, diarrhea, nausea, rectal pain and vomiting     Genitourinary: Negative "for decreased urine volume, difficulty urinating, dysuria, frequency, hematuria and urgency  Musculoskeletal: Negative for arthralgias and back pain  Skin: Negative for color change and rash  Neurological: Negative for seizures, syncope and headaches  Psychiatric/Behavioral: Negative for dysphoric mood  The patient is not nervous/anxious  All other systems reviewed and are negative  Current Outpatient Medications on File Prior to Visit   Medication Sig   • aspirin 81 mg chewable tablet Chew 1 tablet every 24 hours   • atorvastatin (LIPITOR) 20 mg tablet TAKE 1 TABLET BY MOUTH EVERY DAY   • ezetimibe (ZETIA) 10 mg tablet TAKE 1 TABLET BY MOUTH EVERY DAY   • glucose blood test strip Use as instructed  ONE TOUCH ULTRA   • Lancets (onetouch ultrasoft) lancets Use daily   • levETIRAcetam (KEPPRA) 1000 MG tablet TAKE 1 TABLET BY MOUTH TWICE A DAY (Patient taking differently: 1/2 tablet)   • losartan (COZAAR) 50 mg tablet TAKE 1 TABLET BY MOUTH EVERY DAY   • metFORMIN (GLUCOPHAGE) 1000 MG tablet TAKE 1 TABLET BY MOUTH TWICE A DAY WITH MEALS   • OneTouch Ultra test strip USE 1 EACH IN THE MORNING USE AS INSTRUCTED   • sertraline (ZOLOFT) 100 mg tablet TAKE 1 TABLET BY MOUTH EVERY DAY   • triamcinolone (KENALOG) 0 1 % cream Apply topically 2 (two) times a day       Objective     /72 (BP Location: Left arm, Patient Position: Sitting, Cuff Size: Adult)   Pulse 83   Temp 97 6 °F (36 4 °C) (Temporal)   Ht 5' 8 5\" (1 74 m)   Wt 82 5 kg (181 lb 12 8 oz)   SpO2 96%   BMI 27 24 kg/m²     Physical Exam  Vitals and nursing note reviewed  Constitutional:       Appearance: Normal appearance  He is normal weight  HENT:      Head: Normocephalic  Right Ear: Tympanic membrane, ear canal and external ear normal       Left Ear: Tympanic membrane, ear canal and external ear normal       Nose: Nose normal       Mouth/Throat:      Mouth: Mucous membranes are moist       Pharynx: Oropharynx is clear     Eyes: " Pupils: Pupils are equal, round, and reactive to light  Cardiovascular:      Rate and Rhythm: Normal rate and regular rhythm  Pulses: Normal pulses  Heart sounds: Normal heart sounds  Pulmonary:      Effort: Pulmonary effort is normal       Breath sounds: Normal breath sounds  Abdominal:      General: Bowel sounds are normal       Palpations: Abdomen is soft  There is no mass  Tenderness: There is no abdominal tenderness  Hernia: A hernia is present  Hernia is present in the umbilical area  Comments: Umbilical nontender to palpation   Musculoskeletal:         General: Normal range of motion  Cervical back: Normal range of motion  Skin:     General: Skin is warm  Capillary Refill: Capillary refill takes less than 2 seconds  Neurological:      General: No focal deficit present  Mental Status: He is alert and oriented to person, place, and time  Mental status is at baseline  Psychiatric:         Mood and Affect: Mood normal          Behavior: Behavior normal          Thought Content: Thought content normal          Judgment: Judgment normal        Patient Instructions     Constipation   WHAT YOU NEED TO KNOW:   Constipation means you have hard, dry bowel movements, or you go longer than usual between bowel movements  DISCHARGE INSTRUCTIONS:   Call your doctor if:   • You have blood in your bowel movements  • You have a fever and abdominal pain with the constipation  • Your constipation gets worse  • You start to vomit  • You have questions or concerns about your condition or care  Medicines:   • Medicine  such as a laxative may help relax and loosen your intestines to help you have a bowel movement  Your provider may recommend you only use laxatives for a short time  Long-term use may make your bowels dependent on the medicine  • Take your medicine as directed    Contact your healthcare provider if you think your medicine is not helping or if you have side effects  Tell your provider if you are allergic to any medicine  Keep a list of the medicines, vitamins, and herbs you take  Include the amounts, and when and why you take them  Bring the list or the pill bottles to follow-up visits  Carry your medicine list with you in case of an emergency  Relieve constipation:   • A suppository  may be used to help soften your bowel movements  This may make them easier to pass  A suppository is guided into your rectum through your anus  • An enema  is liquid medicine used to clear bowel movement from your rectum  The medicine is put into your rectum through your anus  Prevent constipation:   • Drink liquids as directed  You may need to drink extra liquids to help soften and move your bowels  Ask how much liquid to drink each day and which liquids are best for you  • Eat high-fiber foods  This may help decrease constipation by adding bulk to your bowel movements  High-fiber foods include fruit, vegetables, whole-grain breads and cereals, and beans  Your healthcare provider or dietitian can help you create a high-fiber meal plan  Your provider may also recommend a fiber supplement if you cannot get enough fiber from food  • Exercise regularly  Regular physical activity can help stimulate your intestines  Walking is a good exercise to prevent or relieve constipation  Ask which exercises are best for you  • Schedule a time each day to have a bowel movement  This may help train your body to have regular bowel movements  Bend forward while you are on the toilet to help move the bowel movement out  Sit on the toilet for at least 10 minutes, even if you do not have a bowel movement  • Talk to your healthcare provider about your medicines  Certain medicines, such as opioids, can cause constipation  Your provider may be able to make medicine changes   For example, he or she may change the kind of medicine, or change when you take it     Follow up with your doctor as directed:  Write down your questions so you remember to ask them during your visits  © [employer] 2022 Information is for End User's use only and may not be sold, redistributed or otherwise used for commercial purposes  The above information is an  only  It is not intended as medical advice for individual conditions or treatments  Talk to your doctor, nurse or pharmacist before following any medical regimen to see if it is safe and effective for you          FELICE Banks

## 2023-07-05 ENCOUNTER — OFFICE VISIT (OUTPATIENT)
Dept: NEUROLOGY | Facility: CLINIC | Age: 63
End: 2023-07-05
Payer: COMMERCIAL

## 2023-07-05 VITALS
OXYGEN SATURATION: 99 % | DIASTOLIC BLOOD PRESSURE: 64 MMHG | TEMPERATURE: 97.9 F | HEART RATE: 78 BPM | BODY MASS INDEX: 27.13 KG/M2 | HEIGHT: 69 IN | WEIGHT: 183.2 LBS | SYSTOLIC BLOOD PRESSURE: 112 MMHG

## 2023-07-05 DIAGNOSIS — I65.23 CAROTID STENOSIS, BILATERAL: ICD-10-CM

## 2023-07-05 DIAGNOSIS — G40.909 EPILEPSY DUE TO OLD STROKE (HCC): ICD-10-CM

## 2023-07-05 DIAGNOSIS — I69.398 EPILEPSY DUE TO OLD STROKE (HCC): ICD-10-CM

## 2023-07-05 DIAGNOSIS — I69.354 HEMIPARESIS AFFECTING LEFT SIDE AS LATE EFFECT OF CEREBROVASCULAR ACCIDENT (HCC): Primary | ICD-10-CM

## 2023-07-05 PROCEDURE — 99214 OFFICE O/P EST MOD 30 MIN: CPT | Performed by: PSYCHIATRY & NEUROLOGY

## 2023-07-05 RX ORDER — LEVETIRACETAM 500 MG/1
500 TABLET ORAL 2 TIMES DAILY
Qty: 180 TABLET | Refills: 3 | Status: SHIPPED | OUTPATIENT
Start: 2023-07-05

## 2023-07-05 RX ORDER — LANOLIN ALCOHOL/MO/W.PET/CERES
3 CREAM (GRAM) TOPICAL
COMMUNITY

## 2023-07-05 NOTE — PROGRESS NOTES
Review of Systems   Constitutional: Negative for appetite change, fatigue and fever. HENT: Negative. Negative for hearing loss, tinnitus, trouble swallowing and voice change. Eyes: Negative. Negative for photophobia, pain and visual disturbance. Respiratory: Negative. Negative for shortness of breath. Cardiovascular: Negative. Negative for palpitations. Gastrointestinal: Negative. Negative for nausea and vomiting. Endocrine: Negative. Negative for cold intolerance. Genitourinary: Negative. Negative for dysuria, frequency and urgency. Musculoskeletal: Negative for back pain, gait problem, myalgias and neck pain. Skin: Negative. Negative for rash. Allergic/Immunologic: Negative. Neurological: Positive for seizures (no recent sz. ). Negative for dizziness, tremors, syncope, facial asymmetry, speech difficulty, weakness, light-headedness, numbness and headaches. Hematological: Negative. Does not bruise/bleed easily. Psychiatric/Behavioral: Negative. Negative for confusion, hallucinations and sleep disturbance. All other systems reviewed and are negative.

## 2023-07-05 NOTE — PATIENT INSTRUCTIONS
Take levetiracetam 500 mg twice daily. Let us know if there are seizures or problems with your medication. Return in 6 months. Will consider stopping levetiracetam at the next visit. Schedule visit with Vascular Surgery (Dr. Pérez Diver office).

## 2023-07-05 NOTE — PROGRESS NOTES
[facility] [address] [facility]  Follow Up Visit    Impression/Plan    Mr. Cathie Dominguez is a 66 y.o. 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 There is chronic spastic left hemiparesis, arm > leg. Seizures are completely controlled on levetiracetam. He had a single seizure in 12/2014, 11 months after his stroke which is the presumed focus.      Take lower dose of levetiracetam (500 mg bid) for about 1 month. We will have him continue on 500 mg twice daily for the next 6 months and see him back. If everything is going well we will have him taper completely off of levetiracetam.     Bilateral carotid disease is followed by vascular surgery. Most recent report reviewed (11/2022, stable). He will continue with medical management and surveillance duplex/imaging.     Patient Instructions   1. Take levetiracetam 500 mg twice daily. 2. Let us know if there are seizures or problems with your medication. 3. Return in 6 months. Will consider stopping levetiracetam at the next visit. 4. Schedule visit with Vascular Surgery (Dr. Jules Castellanos office). Diagnoses and all orders for this visit:    Hemiparesis affecting left side as late effect of cerebrovascular accident [facility])    Epilepsy due to old stroke ([address])  -     levETIRAcetam (KEPPRA) 500 mg tablet; Take 1 tablet (500 mg total) by mouth 2 (two) times a day    Carotid stenosis, bilateral    Other orders  -     melatonin 3 mg; Take 3 mg by mouth daily at bedtime        Kimberly    Alexsander Murcia is returning to the [facility] for follow up. Interval Events:   Seizures since last visit: None (prior: 11/2014)    Last seen January 6, 2023 by Kassie Jewell. EEG was normal after that visit. There had been discussion about decreasing levetiracetam dose if the EEG did not show high risk findings. It appears a phone call to the patient after the routine EEG was not returned.     Last carotid ultrasound was in November and stable. He is due for follow-up with vascular surgery, but it appears their office has not been able to reach him. Current AEDs:  Keppra 500 mg bid  Medication side effects: None  Medication adherence: Yes     Event/Seizure semiology:  Left sided arm and leg shaking     Special Features  Status epilepticus: no  Self Injury Seizures: no  Precipitating Factors: None reported     Epilepsy Risk Factors:  Stroke     Prior AEDs:  Levetiracetam     Prior Evaluation:  EEG 11/4/20:  Clinical Interpretation: This abnormal study is consistent with mild generalized non-specific cerebral dysfunction. No electrographic seizures, interictal epileptiform discharges (seizure tendency) or focal slowing were seen. No diagnostic clinical events are captured.      CT shows chronic large R MCA stroke (personally reviewed 10/28/2020)     VAS carotid study 11/23/22:  Impression  RIGHT:  There is known occlusion of the internal carotid artery. Elevated velocities noted in the proximal external carotid artery consistent  with moderate stenosis. Vertebral artery flow is antegrade. There is no significant subclavian artery  disease. LEFT:  Attenuated waveforms are noted throughout the common carotid artery suggesting  more proximal disease. There is 50-69% stenosis in the internal carotid artery. Plaque is heterogenous  and irregular. Vertebral artery flow is antegrade. There is no significant subclavian artery  disease.     Psychiatric History:  Anxiety: Sertraline and Lorazepam  Depression: Sertraline     Social History:   Driving: Yes  Lives Alone: Lives at home with his son  Occupation: on permanent disability      Objective    /64 (BP Location: Right arm, Patient Position: Sitting, Cuff Size: Standard)   Pulse 78   Temp 97.9 °F (36.6 °C) (Temporal)   Ht 5' 8.5" (1.74 m)   Wt 83.1 kg (183 lb 3.2 oz)   SpO2 99%   BMI 27.45 kg/m²      General Exam  No acute distress.     Neurologic Exam  Mental Status:  Alert and oriented x 3. Language: normal fluency and comprehension. Cranial Nerves:  VFFTC. EOMI, a few beats of gaze dependent nystagmus with extreme right gaze. Face symmetric. No dysarthria. Motor: Spastic left hemiparesis, upper extremity affected more than lower extremity, distally greater than proximally in the upper. There are some movement in the fingers, but he cannot fully extend. Good strength at the elbow and at the shoulder, but limited range of motion at the shoulder. Left hip flexion and left dorsiflexion is 4+ out of 5. Coordination: Finger to nose intact on the right.   DTRs: Brachioradialis and patella: 2+ on the right, 3+ on the left  Gait: Hemiparetic gait

## 2023-07-12 ENCOUNTER — TELEPHONE (OUTPATIENT)
Dept: NEUROLOGY | Facility: CLINIC | Age: 63
End: 2023-07-12

## 2023-07-29 DIAGNOSIS — E11.9 DIABETES MELLITUS WITHOUT COMPLICATION (HCC): ICD-10-CM

## 2023-07-29 DIAGNOSIS — E78.49 OTHER HYPERLIPIDEMIA: ICD-10-CM

## 2023-07-30 RX ORDER — ATORVASTATIN CALCIUM 20 MG/1
TABLET, FILM COATED ORAL
Qty: 90 TABLET | Refills: 2 | Status: SHIPPED | OUTPATIENT
Start: 2023-07-30

## 2023-08-18 ENCOUNTER — APPOINTMENT (OUTPATIENT)
Dept: LAB | Facility: HOSPITAL | Age: 63
End: 2023-08-18
Payer: COMMERCIAL

## 2023-08-18 DIAGNOSIS — E11.59 TYPE 2 DIABETES MELLITUS WITH OTHER CIRCULATORY COMPLICATION, WITHOUT LONG-TERM CURRENT USE OF INSULIN (HCC): ICD-10-CM

## 2023-08-18 DIAGNOSIS — I10 BENIGN ESSENTIAL HYPERTENSION: ICD-10-CM

## 2023-08-18 DIAGNOSIS — Z12.5 SCREENING FOR PROSTATE CANCER: ICD-10-CM

## 2023-08-18 DIAGNOSIS — E78.2 MIXED HYPERLIPIDEMIA: ICD-10-CM

## 2023-08-18 LAB
ALBUMIN SERPL BCP-MCNC: 4.2 G/DL (ref 3.5–5)
ALP SERPL-CCNC: 51 U/L (ref 34–104)
ALT SERPL W P-5'-P-CCNC: 17 U/L (ref 7–52)
ANION GAP SERPL CALCULATED.3IONS-SCNC: 5 MMOL/L
AST SERPL W P-5'-P-CCNC: 13 U/L (ref 13–39)
BILIRUB SERPL-MCNC: 0.54 MG/DL (ref 0.2–1)
BUN SERPL-MCNC: 8 MG/DL (ref 5–25)
CALCIUM SERPL-MCNC: 9.4 MG/DL (ref 8.4–10.2)
CHLORIDE SERPL-SCNC: 105 MMOL/L (ref 96–108)
CHOLEST SERPL-MCNC: 126 MG/DL
CO2 SERPL-SCNC: 28 MMOL/L (ref 21–32)
CREAT SERPL-MCNC: 0.49 MG/DL (ref 0.6–1.3)
CREAT UR-MCNC: 43.9 MG/DL
EST. AVERAGE GLUCOSE BLD GHB EST-MCNC: 140 MG/DL
GFR SERPL CREATININE-BSD FRML MDRD: 117 ML/MIN/1.73SQ M
GLUCOSE P FAST SERPL-MCNC: 144 MG/DL (ref 65–99)
HBA1C MFR BLD: 6.5 %
HDLC SERPL-MCNC: 37 MG/DL
LDLC SERPL CALC-MCNC: 59 MG/DL (ref 0–100)
MICROALBUMIN UR-MCNC: <5 MG/L (ref 0–20)
MICROALBUMIN/CREAT 24H UR: <11 MG/G CREATININE (ref 0–30)
NONHDLC SERPL-MCNC: 89 MG/DL
POTASSIUM SERPL-SCNC: 4.1 MMOL/L (ref 3.5–5.3)
PROT SERPL-MCNC: 6.6 G/DL (ref 6.4–8.4)
PSA SERPL-MCNC: 1.69 NG/ML (ref 0–4)
SODIUM SERPL-SCNC: 138 MMOL/L (ref 135–147)
TRIGL SERPL-MCNC: 151 MG/DL

## 2023-08-18 PROCEDURE — 83036 HEMOGLOBIN GLYCOSYLATED A1C: CPT

## 2023-08-18 PROCEDURE — 82043 UR ALBUMIN QUANTITATIVE: CPT

## 2023-08-18 PROCEDURE — 80053 COMPREHEN METABOLIC PANEL: CPT

## 2023-08-18 PROCEDURE — 80061 LIPID PANEL: CPT

## 2023-08-18 PROCEDURE — 82570 ASSAY OF URINE CREATININE: CPT

## 2023-08-18 PROCEDURE — 36415 COLL VENOUS BLD VENIPUNCTURE: CPT

## 2023-08-18 PROCEDURE — G0103 PSA SCREENING: HCPCS

## 2023-08-23 ENCOUNTER — OFFICE VISIT (OUTPATIENT)
Dept: FAMILY MEDICINE CLINIC | Facility: CLINIC | Age: 63
End: 2023-08-23
Payer: COMMERCIAL

## 2023-08-23 VITALS
SYSTOLIC BLOOD PRESSURE: 110 MMHG | BODY MASS INDEX: 27.02 KG/M2 | TEMPERATURE: 97.9 F | HEART RATE: 80 BPM | WEIGHT: 182.4 LBS | OXYGEN SATURATION: 97 % | HEIGHT: 69 IN | DIASTOLIC BLOOD PRESSURE: 70 MMHG

## 2023-08-23 DIAGNOSIS — I69.398 EPILEPSY DUE TO OLD STROKE (HCC): ICD-10-CM

## 2023-08-23 DIAGNOSIS — Z23 ENCOUNTER FOR VACCINATION: ICD-10-CM

## 2023-08-23 DIAGNOSIS — G40.909 EPILEPSY DUE TO OLD STROKE (HCC): ICD-10-CM

## 2023-08-23 DIAGNOSIS — I65.23 CAROTID STENOSIS, BILATERAL: ICD-10-CM

## 2023-08-23 DIAGNOSIS — E11.59 TYPE 2 DIABETES MELLITUS WITH OTHER CIRCULATORY COMPLICATION, WITHOUT LONG-TERM CURRENT USE OF INSULIN (HCC): ICD-10-CM

## 2023-08-23 DIAGNOSIS — F32.4 MAJOR DEPRESSIVE DISORDER WITH SINGLE EPISODE, IN PARTIAL REMISSION (HCC): ICD-10-CM

## 2023-08-23 DIAGNOSIS — N40.0 BENIGN PROSTATIC HYPERPLASIA WITHOUT LOWER URINARY TRACT SYMPTOMS: ICD-10-CM

## 2023-08-23 DIAGNOSIS — I10 BENIGN ESSENTIAL HYPERTENSION: ICD-10-CM

## 2023-08-23 DIAGNOSIS — E78.2 MIXED HYPERLIPIDEMIA: ICD-10-CM

## 2023-08-23 DIAGNOSIS — Z23 ENCOUNTER FOR IMMUNIZATION: Primary | ICD-10-CM

## 2023-08-23 PROCEDURE — 90677 PCV20 VACCINE IM: CPT

## 2023-08-23 PROCEDURE — 99214 OFFICE O/P EST MOD 30 MIN: CPT | Performed by: FAMILY MEDICINE

## 2023-08-23 PROCEDURE — G0009 ADMIN PNEUMOCOCCAL VACCINE: HCPCS

## 2023-08-23 NOTE — PROGRESS NOTES
Diabetic Foot Exam    Patient's shoes and socks removed. Right Foot/Ankle   Right Foot Inspection  Skin Exam: skin normal and skin intact. No dry skin, no warmth, no callus, no erythema, no maceration, no abnormal color, no pre-ulcer, no ulcer and no callus. Toe Exam: ROM and strength within normal limits. Sensory   Monofilament testing: intact    Vascular  Capillary refills: < 3 seconds  The right DP pulse is 2+. The right PT pulse is 2+. Left Foot/Ankle  Left Foot Inspection  Skin Exam: skin normal and skin intact. No dry skin, no warmth, no erythema, no maceration, normal color, no pre-ulcer, no ulcer and no callus. Toe Exam: ROM and strength within normal limits. Sensory   Monofilament testing: intact    Vascular  Capillary refills: < 3 seconds  The left DP pulse is 2+. The left PT pulse is 2+. Assign Risk Category  No deformity present  No loss of protective sensation  No weak pulses  Risk: 0           Assessment/Plan:      1. Encounter for immunization    2. Type 2 diabetes mellitus with other circulatory complication, without long-term current use of insulin [facility])  Assessment & Plan:    Lab Results   Component Value Date    HGBA1C 6.5 (H) 08/18/2023     Well controlled on present regimen    Orders:  -     Comprehensive metabolic panel; Future; Expected date: 02/23/2024  -     HEMOGLOBIN A1C W/ EAG ESTIMATION; Future; Expected date: 02/23/2024    3. Benign essential hypertension  Assessment & Plan:  Well controlled on present regimen  Continue losartan 50mg    Orders:  -     Comprehensive metabolic panel; Future; Expected date: 02/23/2024    4. Carotid stenosis, bilateral  Assessment & Plan:  Patient is due for follow up with vascular surgery      5. Epilepsy due to old stroke [facility])  Assessment & Plan:  Well controlled  Had evaluation with neurology  Is considering weaning patient off levetiracetam 500mg bid  No recent seizures      6.  Benign prostatic hyperplasia without lower urinary tract symptoms  Assessment & Plan:  Stable  psa remains wnl  No new LUTS  Continue to monitor      7. Mixed hyperlipidemia  Assessment & Plan:  Lab Results   Component Value Date    LDLCALC 59 08/18/2023     Continue atorvastatin 20mg daily      8. Major depressive disorder with single episode, in partial remission [facility])  Assessment & Plan:  Well controlled on present regimen  Patient reports being a great mood  He is walking more and having good energy  Denies SI/HI  Continue sertraline 100mg      9. Encounter for vaccination  -     Pneumococcal Conjugate Vaccine 20-valent (PCV20)            Subjective:      Patient ID: Teri Quiñones is a 66 y.o. male presents today for routine follow up. He feels good today. Has been walking more frequently. He also had blood work completed. HPI    The following portions of the patient's history were reviewed and updated as appropriate: allergies, current medications, past family history, past medical history, past social history, past surgical history and problem list.    Review of Systems   Constitutional: Negative for activity change, chills, diaphoresis and fever. HENT: Negative for ear pain, hearing loss, postnasal drip, rhinorrhea, sinus pressure, sinus pain, sneezing and sore throat. Respiratory: Negative for cough, chest tightness, shortness of breath and wheezing. Cardiovascular: Negative for chest pain, palpitations and leg swelling. Gastrointestinal: Negative for abdominal pain, blood in stool, constipation, diarrhea, nausea and vomiting. Genitourinary: Negative for dysuria, frequency, hematuria and urgency. Musculoskeletal: Negative for arthralgias and myalgias. Neurological: Negative for dizziness, syncope, weakness, light-headedness, numbness and headaches. Psychiatric/Behavioral: Negative for decreased concentration and self-injury. The patient is not nervous/anxious and is not hyperactive.           Objective:      /70 (BP Location: Left arm, Patient Position: Sitting, Cuff Size: Adult)   Pulse 80   Temp 97.9 °F (36.6 °C) (Temporal)   Ht 5' 8.5" (1.74 m)   Wt 82.7 kg (182 lb 6.4 oz)   SpO2 97%   BMI 27.33 kg/m²          Physical Exam  Vitals reviewed. Constitutional:       General: He is not in acute distress. Appearance: He is well-developed. He is not diaphoretic. HENT:      Head: Normocephalic and atraumatic. Right Ear: External ear normal.      Left Ear: External ear normal.      Nose: Nose normal. No congestion. Mouth/Throat:      Mouth: Mucous membranes are moist.      Pharynx: Oropharynx is clear. No oropharyngeal exudate. Eyes:      General: No scleral icterus. Pupils: Pupils are equal, round, and reactive to light. Neck:      Thyroid: No thyromegaly. Vascular: No JVD. Trachea: No tracheal deviation. Cardiovascular:      Rate and Rhythm: Normal rate and regular rhythm. Pulses: Normal pulses. no weak pulses          Dorsalis pedis pulses are 2+ on the right side and 2+ on the left side. Posterior tibial pulses are 2+ on the right side and 2+ on the left side. Heart sounds: Normal heart sounds. No murmur heard. No friction rub. Pulmonary:      Effort: Pulmonary effort is normal. No respiratory distress. Breath sounds: Normal breath sounds. No wheezing or rales. Chest:      Chest wall: No tenderness. Abdominal:      General: Bowel sounds are normal. There is no distension. Palpations: Abdomen is soft. Tenderness: There is no abdominal tenderness. There is no right CVA tenderness, left CVA tenderness, guarding or rebound. Musculoskeletal:         General: No tenderness. Normal range of motion. Cervical back: Normal range of motion and neck supple. No tenderness. Right lower leg: No edema. Left lower leg: No edema. Feet:      Right foot:      Skin integrity: No ulcer, skin breakdown, erythema, warmth, callus or dry skin. Left foot:      Skin integrity: No ulcer, skin breakdown, erythema, warmth, callus or dry skin. Lymphadenopathy:      Cervical: No cervical adenopathy. Skin:     General: Skin is warm and dry. Neurological:      Mental Status: He is alert and oriented to person, place, and time. Mental status is at baseline. Deep Tendon Reflexes: Reflexes are normal and symmetric. Psychiatric:         Mood and Affect: Mood normal.         Behavior: Behavior normal.         Thought Content:  Thought content normal.         Judgment: Judgment normal.

## 2023-08-23 NOTE — ASSESSMENT & PLAN NOTE
Well controlled  · Had evaluation with neurology  · Is considering weaning patient off levetiracetam 500mg bid  · No recent seizures

## 2023-08-23 NOTE — ASSESSMENT & PLAN NOTE
· Well controlled on present regimen  · Patient reports being a great mood  · He is walking more and having good energy  · Denies SI/HI  · Continue sertraline 100mg

## 2023-08-23 NOTE — ASSESSMENT & PLAN NOTE
Lab Results   Component Value Date    HGBA1C 6.5 (H) 08/18/2023     Well controlled on present regimen

## 2023-09-26 ENCOUNTER — TRANSCRIBE ORDERS (OUTPATIENT)
Dept: VASCULAR SURGERY | Facility: CLINIC | Age: 63
End: 2023-09-26

## 2023-09-26 DIAGNOSIS — I65.23 CAROTID STENOSIS, BILATERAL: Primary | ICD-10-CM

## 2023-10-06 DIAGNOSIS — I10 BENIGN ESSENTIAL HYPERTENSION: ICD-10-CM

## 2023-10-06 RX ORDER — LOSARTAN POTASSIUM 50 MG/1
TABLET ORAL
Qty: 90 TABLET | Refills: 3 | Status: SHIPPED | OUTPATIENT
Start: 2023-10-06

## 2023-10-11 DIAGNOSIS — F32.A DEPRESSIVE DISORDER: ICD-10-CM

## 2023-10-12 RX ORDER — SERTRALINE HYDROCHLORIDE 100 MG/1
TABLET, FILM COATED ORAL
Qty: 90 TABLET | Refills: 1 | Status: SHIPPED | OUTPATIENT
Start: 2023-10-12

## 2023-10-27 ENCOUNTER — CLINICAL SUPPORT (OUTPATIENT)
Dept: FAMILY MEDICINE CLINIC | Facility: CLINIC | Age: 63
End: 2023-10-27
Payer: COMMERCIAL

## 2023-10-27 DIAGNOSIS — Z23 ENCOUNTER FOR IMMUNIZATION: Primary | ICD-10-CM

## 2023-10-27 PROCEDURE — G0008 ADMIN INFLUENZA VIRUS VAC: HCPCS

## 2023-10-27 PROCEDURE — 90686 IIV4 VACC NO PRSV 0.5 ML IM: CPT

## 2023-11-08 ENCOUNTER — HOSPITAL ENCOUNTER (OUTPATIENT)
Dept: NON INVASIVE DIAGNOSTICS | Facility: CLINIC | Age: 63
Discharge: HOME | End: 2023-11-08
Payer: COMMERCIAL

## 2023-11-08 DIAGNOSIS — I65.23 CAROTID STENOSIS, BILATERAL: ICD-10-CM

## 2023-11-08 PROCEDURE — 93880 EXTRACRANIAL BILAT STUDY: CPT

## 2023-11-09 PROCEDURE — 93880 EXTRACRANIAL BILAT STUDY: CPT | Performed by: SURGERY

## 2023-11-15 ENCOUNTER — HOSPITAL ENCOUNTER (EMERGENCY)
Facility: HOSPITAL | Age: 63
Discharge: HOME | End: 2023-11-15
Attending: EMERGENCY MEDICINE
Payer: COMMERCIAL

## 2023-11-15 ENCOUNTER — APPOINTMENT (EMERGENCY)
Dept: CT IMAGING | Facility: HOSPITAL | Age: 63
End: 2023-11-15
Payer: COMMERCIAL

## 2023-11-15 VITALS
HEART RATE: 71 BPM | TEMPERATURE: 98.2 F | OXYGEN SATURATION: 98 % | SYSTOLIC BLOOD PRESSURE: 154 MMHG | DIASTOLIC BLOOD PRESSURE: 83 MMHG | BODY MASS INDEX: 27.42 KG/M2 | WEIGHT: 182.98 LBS | RESPIRATION RATE: 18 BRPM

## 2023-11-15 DIAGNOSIS — S00.01XA ABRASION OF SCALP, INITIAL ENCOUNTER: ICD-10-CM

## 2023-11-15 DIAGNOSIS — S09.90XA CLOSED HEAD INJURY, INITIAL ENCOUNTER: Primary | ICD-10-CM
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
## 2023-11-15 DIAGNOSIS — Z23 NEED FOR IMMUNIZATION WITH DIPHTHERIA, TETANUS, AND POLIOVIRUS VACCINE: ICD-10-CM

## 2023-11-15 PROCEDURE — 99285 EMERGENCY DEPT VISIT HI MDM: CPT | Performed by: EMERGENCY MEDICINE

## 2023-11-15 PROCEDURE — 99285 EMERGENCY DEPT VISIT HI MDM: CPT

## 2023-11-15 PROCEDURE — 90471 IMMUNIZATION ADMIN: CPT

## 2023-11-15 PROCEDURE — 70450 CT HEAD/BRAIN W/O DYE: CPT

## 2023-11-15 PROCEDURE — 90715 TDAP VACCINE 7 YRS/> IM: CPT | Performed by: EMERGENCY MEDICINE

## 2023-11-15 PROCEDURE — 72125 CT NECK SPINE W/O DYE: CPT

## 2023-11-15 PROCEDURE — G1004 CDSM NDSC: HCPCS

## 2023-11-15 RX ORDER — LIDOCAINE 40 MG/G
CREAM TOPICAL ONCE
Status: COMPLETED | OUTPATIENT
Start: 2023-11-15 | End: 2023-11-15

## 2023-11-15 RX ADMIN — TETANUS TOXOID, REDUCED DIPHTHERIA TOXOID AND ACELLULAR PERTUSSIS VACCINE, ADSORBED 0.5 ML: 5; 2.5; 8; 8; 2.5 SUSPENSION INTRAMUSCULAR at 21:24

## 2023-11-15 RX ADMIN — LIDOCAINE 4%: 4 CREAM TOPICAL at 20:50

## 2023-11-16 NOTE — ED PROVIDER NOTES
History  Chief Complaint   Patient presents with    Fall     Patient arrived via ems from home from mechanical fall after getting out of his truck. Patient does have history of left sided weakness from past stroke     60-year-old male presents for evaluation of head injury. Patient states he was stepping onto a curb when he fell backwards striking his head off of the curb. No loss of consciousness. Patient complains of pain in his neck and head. Denies other traumatic injuries. No focal numbness weakness, nausea, vomiting, fevers, chills. Patient does take baby aspirin daily. History provided by:  Patient  Fall  Associated symptoms: headaches and neck pain    Associated symptoms: no abdominal pain, no chest pain, no nausea and no vomiting        Prior to Admission Medications   Prescriptions Last Dose Informant Patient Reported? Taking?    Lancets (onetouch ultrasoft) lancets  Self No Yes   Sig: Use daily   OneTouch Ultra test strip  Self No Yes   Sig: USE 1 EACH IN THE MORNING USE AS INSTRUCTED   aspirin 81 mg chewable tablet  Self Yes Yes   Sig: Chew 1 tablet every 24 hours   atorvastatin (LIPITOR) 20 mg tablet   No Yes   Sig: TAKE 1 TABLET BY MOUTH EVERY DAY   bisacodyl (DULCOLAX) 10 mg suppository  Self No No   Sig: Insert 1 suppository (10 mg total) into the rectum daily   Patient not taking: Reported on 7/5/2023   ezetimibe (ZETIA) 10 mg tablet  Self No Yes   Sig: TAKE 1 TABLET BY MOUTH EVERY DAY   glucose blood test strip  Self No Yes   Sig: Use as instructed  ONE TOUCH ULTRA   levETIRAcetam (KEPPRA) 500 mg tablet   No Yes   Sig: Take 1 tablet (500 mg total) by mouth 2 (two) times a day   losartan (COZAAR) 50 mg tablet   No Yes   Sig: TAKE 1 TABLET BY MOUTH EVERY DAY   melatonin 3 mg  Self Yes Yes   Sig: Take 3 mg by mouth daily at bedtime   metFORMIN (GLUCOPHAGE) 1000 MG tablet   No Yes   Sig: TAKE 1 TABLET BY MOUTH TWICE A DAY WITH MEALS   senna-docusate sodium (SENOKOT-S) 8.6-50 mg per tablet Self No No   Sig: Take 1 tablet by mouth daily   Patient not taking: Reported on 2023   sertraline (ZOLOFT) 100 mg tablet   No Yes   Sig: TAKE 1 TABLET BY MOUTH EVERY DAY   triamcinolone (KENALOG) 0.1 % cream  Self No Yes   Sig: Apply topically 2 (two) times a day      Facility-Administered Medications: None       Past Medical History:   Diagnosis Date    CAD (coronary artery disease) 01/15/2014    100% occlusion of right    CVA (cerebral vascular accident) ([address]) 01/15/2014    Depressive disorder     Diabetes ([address])     Dyslipidemia     Erectile dysfunction 2009    Hemiparesis ([address]) 01/15/2014    left    Hypertension     Seizure ([address]) 2014    Stroke [facility])        Past Surgical History:   Procedure Laterality Date    COLONOSCOPY  10/27/2016    1 cm cecal polyp and 2 other polyps tubular adenomas. Multiple small polyps in rectum fulgurated by Dr. Javon Cortes         Family History   Problem Relation Age of Onset    Cancer Mother         unknown    Diabetes Mother     Diabetes Father     Diabetes Brother     Hypertension Family     Diabetes Family     Pancreatic cancer Cousin      I have reviewed and agree with the history as documented. E-Cigarette/Vaping    E-Cigarette Use Never User      E-Cigarette/Vaping Substances    Nicotine No     THC No     CBD No     Flavoring No     Other No     Unknown No      Social History     Tobacco Use    Smoking status: Every Day     Packs/day: 0.25     Types: Cigars, Cigarettes     Last attempt to quit: 2014     Years since quittin.8    Smokeless tobacco: Current    Tobacco comments:     heavy tob smoke - 3 cigars day - quit 14- no passive smoke exposure as per [employer]   Vaping Use    Vaping Use: Never used   Substance Use Topics    Alcohol use: Yes     Comment: occasional    Drug use: No       Review of Systems   Constitutional:  Negative for activity change, appetite change, fatigue and fever.    HENT:  Negative for congestion, dental problem, ear pain, rhinorrhea and sore throat. Eyes:  Negative for pain and redness. Respiratory:  Negative for chest tightness, shortness of breath and wheezing. Cardiovascular:  Negative for chest pain and palpitations. Gastrointestinal:  Negative for abdominal pain, blood in stool, constipation, diarrhea, nausea and vomiting. Endocrine: Negative for cold intolerance and heat intolerance. Genitourinary:  Negative for dysuria, frequency and hematuria. Musculoskeletal:  Positive for neck pain. Negative for arthralgias and myalgias. Skin:  Positive for wound. Negative for color change, pallor and rash. Neurological:  Positive for headaches. Negative for weakness and numbness. Hematological:  Does not bruise/bleed easily. Psychiatric/Behavioral:  Negative for agitation, hallucinations and suicidal ideas. Physical Exam  Physical Exam  Vitals and nursing note reviewed. Constitutional:       Appearance: He is well-developed. HENT:      Head:      Comments: Scalp abrasion. Eyes:      Comments: Patient has painless full range of motion in both her eyes. Normal visual fields. No hyphema noted. Neck:      Trachea: No tracheal deviation. Comments: Patient is nontender palpation over her cervical, thoracic, lumbar spines. There is no step-offs, no deformities. Cardiovascular:      Comments: No JVD noted. Heart sounds are normal. Regular rate rate and rhythm. Symmetric strong distal pulses. Pulmonary:      Effort: Pulmonary effort is normal.      Breath sounds: Normal breath sounds. Chest:      Chest wall: No tenderness. Abdominal:      Comments: No external signs of trauma noted on the abdomen/pelvis. Patient is nontender palpation of the abdomen. There is no rebound, guarding, CVA tenderness. Abdomen is nondistended. Pelvis stable, nttp. Musculoskeletal:      Comments: Right upper extremity has full range of motion without pain. There is no tenderness palpation noted.  Patient has no external signs of trauma. Patient is neurovascularly intact in this extremity. Left upper extremity has full range of motion without pain. There is no tenderness palpation noted. Patient has no external signs of trauma. Patient is neurovascularly intact in this extremity. Right lower extremity has full range of motion without pain. There is no tenderness palpation noted. Patient has no external signs of trauma. Patient is neurovascularly intact in this extremity. Left Lower  extremity has full range of motion without pain. There is no tenderness palpation noted. Patient has no external signs of trauma. Patient is neurovascularly intact in this extremity. Skin:     Comments: Scalp abrasion   Neurological:      Comments: Alert and oriented ×3. Normal mental status exam. Normal cranial nerves II through XII. Normal sensation and strength throughout. Normal cerebellar exam. GCS 15.   Psychiatric:         Behavior: Behavior normal.         Judgment: Judgment normal.         Vital Signs  ED Triage Vitals   Temperature Pulse Respirations Blood Pressure SpO2   11/15/23 1945 11/15/23 1944 11/15/23 1944 11/15/23 1944 11/15/23 1944   98.2 °F (36.8 °C) 82 18 132/79 97 %      Temp src Heart Rate Source Patient Position - Orthostatic VS BP Location FiO2 (%)   -- -- -- -- --             Pain Score       11/15/23 1943       3           Vitals:    11/15/23 1944 11/15/23 2158   BP: 132/79 154/83   Pulse: 82 71         Visual Acuity      ED Medications  Medications   lidocaine (LMX) 4 % cream ( Topical Given 11/15/23 2050)   tetanus-diphtheria-acellular pertussis (BOOSTRIX) IM injection 0.5 mL (0.5 mL Intramuscular Given 11/15/23 2124)       Diagnostic Studies  Results Reviewed       None                   CT head without contrast   Final Result by Shikha Munoz MD (11/15 2236)      No acute intracranial abnormality.                Workstation performed: [id number]         CT cervical spine without contrast   Final Result by Judit Jamison MD (11/15 2235)      No cervical spine fracture or traumatic malalignment. Workstation performed: [id number]                    Procedures  Procedures         ED Course  ED Course as of 11/15/23 2259   Wed Nov 15, 2023   2254 Trauma work-up reviewed and benign. Patient had bandage placed on his abrasion on his scalp abrasion which did not require closure. Patient is appropriate for discharge home with outpatient follow. SBIRT 22yo+      Flowsheet Row Most Recent Value   Initial Alcohol Screen: US AUDIT-C     1. How often do you have a drink containing alcohol? 0 Filed at: 11/15/2023 1941   2. How many drinks containing alcohol do you have on a typical day you are drinking? 0 Filed at: 11/15/2023 1941   3a. Male UNDER 65: How often do you have five or more drinks on one occasion? 0 Filed at: 11/15/2023 1941   Audit-C Score 0 Filed at: 11/15/2023 1941   STARR: How many times in the past year have you. .. Used an illegal drug or used a prescription medication for non-medical reasons? Never Filed at: 11/15/2023 1941                      Medical Decision Making  Mechanical fall with head trauma-we will do CT head to rule out acute sinus pathology such as skull fracture, CNS bleed, will do CT cervical spine to rule out fractures dislocation given neck pain with head strike. Patient is out of date on tetanus update tetanus. Local wound care for scalp abrasion. Amount and/or Complexity of Data Reviewed  Radiology: ordered. Risk  OTC drugs. Prescription drug management.              Disposition  Final diagnoses:   Closed head injury, initial encounter   Fall, initial encounter   Abrasion of scalp, initial encounter   Need for immunization with diphtheria, tetanus, and poliovirus vaccine     Time reflects when diagnosis was documented in both MDM as applicable and the Disposition within this note       Time User Action Codes Description Comment 11/15/2023 10:55 PM Ivan Gill Query Add [S09.90XA] Closed head injury, initial encounter     11/15/2023 10:55 PM Best Shutter Add [X39. RTPH] Fall, initial encounter     11/15/2023 10:55 PM Hosak, Sinton Query Add [S00.01XA] Abrasion of scalp, initial encounter     11/15/2023 10:56 PM Hosak, Sinton Query Add [Z23] Need for immunization with diphtheria, tetanus, and poliovirus vaccine           ED Disposition       ED Disposition   Discharge    Condition   Stable    Date/Time   Wed Nov 15, 2023 [id number] discharge to home/self care. Follow-up Information       Follow up With Specialties Details Why Jennifer Sharma DO Family Medicine Schedule an appointment as soon as possible for a visit in 2 days  [address] [place] [address] Road [address] [phone]              Patient's Medications   Discharge Prescriptions    No medications on file       No discharge procedures on file.     PDMP Review         Value Time User    PDMP Reviewed  Yes 6/21/2021  4:53 PM Damián Stern, [address] [place] [address]            ED Provider  Electronically Signed by             Yasmin Gauthier MD  11/15/23 [phone]

## 2023-11-22 ENCOUNTER — TELEPHONE (OUTPATIENT)
Dept: VASCULAR SURGERY | Facility: CLINIC | Age: 63
End: 2023-11-22

## 2023-11-22 NOTE — TELEPHONE ENCOUNTER
Patient needs an ov to review recent CV doppler. Last seen in 2021.   Called patient to schedule - he asked for a call back at a later time

## 2023-11-22 NOTE — TELEPHONE ENCOUNTER
----- Message from Aneesh Caballero sent at 11/13/2023  1:29 PM EST -----    ----- Message -----  From: Severa Mackie, MD  Sent: 11/10/2023   9:23 AM EST  To: [facility] Consensus Pool    Patient is overdue for office follow-up. Suggest initial evaluation by AP.   Heather Dubose  ----- Message -----  From: Clotilde Dent  Sent: 11/10/2023   8:58 AM EST  To: Severa Mackie, MD    Task study to:  Antione Cabello MD.  Signed by:  Antione Cabello MD.

## 2023-12-20 ENCOUNTER — OFFICE VISIT (OUTPATIENT)
Dept: NEUROLOGY | Facility: CLINIC | Age: 63
End: 2023-12-20
Payer: COMMERCIAL

## 2023-12-20 VITALS
TEMPERATURE: 98 F | DIASTOLIC BLOOD PRESSURE: 70 MMHG | HEART RATE: 80 BPM | OXYGEN SATURATION: 97 % | SYSTOLIC BLOOD PRESSURE: 124 MMHG | HEIGHT: 69 IN | WEIGHT: 184.1 LBS | BODY MASS INDEX: 27.27 KG/M2

## 2023-12-20 DIAGNOSIS — I69.398 EPILEPSY DUE TO OLD STROKE (HCC): ICD-10-CM

## 2023-12-20 DIAGNOSIS — G40.909 EPILEPSY DUE TO OLD STROKE (HCC): ICD-10-CM

## 2023-12-20 PROCEDURE — 99214 OFFICE O/P EST MOD 30 MIN: CPT | Performed by: PSYCHIATRY & NEUROLOGY

## 2023-12-20 NOTE — PROGRESS NOTES
Review of Systems   Constitutional: Negative.    HENT: Negative.     Eyes: Negative.    Respiratory: Negative.     Cardiovascular: Negative.    Gastrointestinal: Negative.    Endocrine: Negative.    Genitourinary: Negative.    Musculoskeletal: Negative.    Skin: Negative.    Allergic/Immunologic: Negative.    Hematological: Negative.    Psychiatric/Behavioral: Negative.     All other systems reviewed and are negative.

## 2023-12-20 NOTE — PATIENT INSTRUCTIONS
Decrease levetiracetam to 250 mg (half pill) twice daily for one month and then stop levetiracetam.   Schedule visit with vascular surgery.

## 2023-12-20 NOTE — PROGRESS NOTES
[facility]  Follow Up Visit    Impression/Plan    Mr. Anaya is a 63 y.o. 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 There is chronic spastic left hemiparesis, arm > leg. Seizures are completely controlled on levetiracetam. He had a single seizure in 12/2014, 11 months after his stroke which is the presumed focus.      Seizure free for 9 years. Recent EEG without evidence of increased seizure risk. Decrease levetiracetam to 250 mg bid for one month and then stop.      Bilateral carotid disease is followed by vascular surgery. Recent duplex reviewed. He needs to schedule visit with vascular surgery.      Return in one year. Can likely follow up with us as needed after that.     Patient Instructions     Decrease levetiracetam to 250 mg (half pill) twice daily for one month and then stop levetiracetam.   Schedule visit with vascular surgery.     Diagnoses and all orders for this visit:    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)        Subjective    Zeyad Anaya is returning to the [facility] for follow up.     Interval Events:   Seizures since last visit: None    No events concerning for seizure. Taking levetiracetam 500 mg bid.     Current AEDs:  Keppra 500 mg bid  Medication side effects: None  Medication adherence: Yes     Event/Seizure semiology:  Left sided arm and leg shaking     Special Features  Status epilepticus: no  Self Injury Seizures: no  Precipitating Factors: None reported     Epilepsy Risk Factors:  Stroke     Prior AEDs:  Levetiracetam     Prior Evaluation:  EEG 11/4/20:  This abnormal study is consistent with mild generalized non-specific cerebral dysfunction. No electrographic seizures, interictal epileptiform discharges (seizure tendency) or focal slowing were seen. No diagnostic clinical events are captured.      CT shows chronic large R MCA stroke (personally reviewed 10/28/2020)     VAS  "carotid study 11/23/22:  Impression  RIGHT:  There is known occlusion of the internal carotid artery.  Elevated velocities noted in the proximal external carotid artery consistent  with moderate stenosis.  Vertebral artery flow is antegrade. There is no significant subclavian artery  disease.  LEFT:  Attenuated waveforms are noted throughout the common carotid artery suggesting  more proximal disease.  There is 50-69% stenosis in the internal carotid artery. Plaque is heterogenous  and irregular.  Vertebral artery flow is antegrade. There is no significant subclavian artery  disease.     Psychiatric History:  Anxiety: Sertraline and Lorazepam  Depression: Sertraline     Social History:   Driving: Yes  Lives Alone: Lives at home with his son  Occupation: on permanent disability      Objective    /70 (BP Location: Right arm, Patient Position: Sitting, Cuff Size: Adult)   Pulse 80   Temp 98 °F (36.7 °C) (Temporal)   Ht 5' 8.5\" (1.74 m)   Wt 83.5 kg (184 lb 1.6 oz)   SpO2 97%   BMI 27.59 kg/m²      General Exam  No acute distress.    Neurologic Exam  Mental Status:  Alert and oriented.  Language: normal fluency and comprehension.  Cranial Nerves:  VFFTC. EOMI, no nystagums. Left facial weakness. No dysarthria.  Motor:  left spastic hemiparesis, upper > lower, distal > proximal. Cannot extend left fingers. Abducts left should to about 60 degrees.   Coordination: Finger to nose intact on right  Gait: hemiparetic gait, no assistance .        "

## 2023-12-28 ENCOUNTER — TELEPHONE (OUTPATIENT)
Dept: NEUROLOGY | Facility: CLINIC | Age: 63
End: 2023-12-28

## 2024-01-21 DIAGNOSIS — E78.2 MIXED HYPERLIPIDEMIA: ICD-10-CM

## 2024-01-22 RX ORDER — EZETIMIBE 10 MG/1
TABLET ORAL
Qty: 90 TABLET | Refills: 1 | Status: SHIPPED | OUTPATIENT
Start: 2024-01-22

## 2024-02-15 ENCOUNTER — RA CDI HCC (OUTPATIENT)
Dept: OTHER | Facility: HOSPITAL | Age: 64
End: 2024-02-15

## 2024-02-21 ENCOUNTER — APPOINTMENT (OUTPATIENT)
Dept: LAB | Facility: HOSPITAL | Age: 64
End: 2024-02-21
Payer: COMMERCIAL

## 2024-02-21 DIAGNOSIS — E11.59 TYPE 2 DIABETES MELLITUS WITH OTHER CIRCULATORY COMPLICATION, WITHOUT LONG-TERM CURRENT USE OF INSULIN (HCC): ICD-10-CM

## 2024-02-21 DIAGNOSIS — I10 BENIGN ESSENTIAL HYPERTENSION: ICD-10-CM

## 2024-02-21 LAB
ALBUMIN SERPL BCP-MCNC: 4.1 G/DL (ref 3.5–5)
ALP SERPL-CCNC: 52 U/L (ref 34–104)
ALT SERPL W P-5'-P-CCNC: 36 U/L (ref 7–52)
ANION GAP SERPL CALCULATED.3IONS-SCNC: 4 MMOL/L
AST SERPL W P-5'-P-CCNC: 37 U/L (ref 13–39)
BILIRUB SERPL-MCNC: 0.37 MG/DL (ref 0.2–1)
BUN SERPL-MCNC: 16 MG/DL (ref 5–25)
CALCIUM SERPL-MCNC: 9.5 MG/DL (ref 8.4–10.2)
CHLORIDE SERPL-SCNC: 103 MMOL/L (ref 96–108)
CO2 SERPL-SCNC: 29 MMOL/L (ref 21–32)
CREAT SERPL-MCNC: 0.47 MG/DL (ref 0.6–1.3)
EST. AVERAGE GLUCOSE BLD GHB EST-MCNC: 131 MG/DL
GFR SERPL CREATININE-BSD FRML MDRD: 119 ML/MIN/1.73SQ M
GLUCOSE P FAST SERPL-MCNC: 122 MG/DL (ref 65–99)
HBA1C MFR BLD: 6.2 %
POTASSIUM SERPL-SCNC: 4.6 MMOL/L (ref 3.5–5.3)
PROT SERPL-MCNC: 6.7 G/DL (ref 6.4–8.4)
SODIUM SERPL-SCNC: 136 MMOL/L (ref 135–147)

## 2024-02-21 PROCEDURE — 83036 HEMOGLOBIN GLYCOSYLATED A1C: CPT

## 2024-02-21 PROCEDURE — 80053 COMPREHEN METABOLIC PANEL: CPT

## 2024-02-21 PROCEDURE — 36415 COLL VENOUS BLD VENIPUNCTURE: CPT

## 2024-02-24 ENCOUNTER — RA CDI HCC (OUTPATIENT)
Dept: OTHER | Facility: HOSPITAL | Age: 64
End: 2024-02-24

## 2024-02-24 PROBLEM — E11.51 TYPE 2 DIABETES MELLITUS WITH DIABETIC PERIPHERAL ANGIOPATHY WITHOUT GANGRENE (HCC): Status: ACTIVE | Noted: 2024-02-24

## 2024-02-25 NOTE — PROGRESS NOTES
HCC coding opportunities     E11.51     Chart Reviewed number of suggestions sent to Provider: 1     GR    Patients Insurance     Medicare Insurance: [employer]age

## 2024-02-28 ENCOUNTER — OFFICE VISIT (OUTPATIENT)
Dept: FAMILY MEDICINE CLINIC | Facility: CLINIC | Age: 64
End: 2024-02-28
Payer: COMMERCIAL

## 2024-02-28 VITALS
DIASTOLIC BLOOD PRESSURE: 78 MMHG | BODY MASS INDEX: 26.51 KG/M2 | HEART RATE: 81 BPM | HEIGHT: 69 IN | OXYGEN SATURATION: 97 % | SYSTOLIC BLOOD PRESSURE: 122 MMHG | WEIGHT: 179 LBS | TEMPERATURE: 97.6 F

## 2024-02-28 DIAGNOSIS — Z12.5 SCREENING FOR PROSTATE CANCER: ICD-10-CM

## 2024-02-28 DIAGNOSIS — E11.59 TYPE 2 DIABETES MELLITUS WITH OTHER CIRCULATORY COMPLICATION, WITHOUT LONG-TERM CURRENT USE OF INSULIN (HCC): ICD-10-CM

## 2024-02-28 DIAGNOSIS — I10 BENIGN ESSENTIAL HYPERTENSION: ICD-10-CM

## 2024-02-28 DIAGNOSIS — E78.2 MIXED HYPERLIPIDEMIA: ICD-10-CM

## 2024-02-28 DIAGNOSIS — Z00.00 MEDICARE ANNUAL WELLNESS VISIT, SUBSEQUENT: Primary | ICD-10-CM

## 2024-02-28 DIAGNOSIS — I65.23 CAROTID STENOSIS, BILATERAL: ICD-10-CM

## 2024-02-28 DIAGNOSIS — E11.51 TYPE 2 DIABETES MELLITUS WITH DIABETIC PERIPHERAL ANGIOPATHY WITHOUT GANGRENE, WITHOUT LONG-TERM CURRENT USE OF INSULIN (HCC): ICD-10-CM
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
## 2024-02-28 DIAGNOSIS — I69.398 EPILEPSY DUE TO OLD STROKE (HCC): ICD-10-CM

## 2024-02-28 DIAGNOSIS — I69.354 HEMIPARESIS AFFECTING LEFT SIDE AS LATE EFFECT OF CEREBROVASCULAR ACCIDENT (HCC): ICD-10-CM

## 2024-02-28 DIAGNOSIS — G40.909 EPILEPSY DUE TO OLD STROKE (HCC): ICD-10-CM

## 2024-02-28 DIAGNOSIS — F32.4 MAJOR DEPRESSIVE DISORDER WITH SINGLE EPISODE, IN PARTIAL REMISSION (HCC): ICD-10-CM

## 2024-02-28 DIAGNOSIS — F17.299 OTHER TOBACCO PRODUCT NICOTINE DEPENDENCE WITH NICOTINE-INDUCED DISORDER: ICD-10-CM

## 2024-02-28 DIAGNOSIS — F32.A DEPRESSIVE DISORDER: ICD-10-CM
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
## 2024-02-28 PROCEDURE — 99214 OFFICE O/P EST MOD 30 MIN: CPT | Performed by: FAMILY MEDICINE

## 2024-02-28 PROCEDURE — G0439 PPPS, SUBSEQ VISIT: HCPCS | Performed by: FAMILY MEDICINE

## 2024-02-28 NOTE — ASSESSMENT & PLAN NOTE
Well-controlled  No seizures in the last 10 years  Patient follows with neurology and is weaning off of Keppra

## 2024-02-28 NOTE — ASSESSMENT & PLAN NOTE
Patient had a recent fall off of a curb with head trauma  Had trauma evaluation which was unremarkable  Patient would benefit from follow-up with physical therapy for further evaluation and treatment as well as strengthening exercises

## 2024-02-28 NOTE — PROGRESS NOTES
Assessment and Plan:     Problem List Items Addressed This Visit        Endocrine    Type 2 diabetes mellitus with circulatory disorder, without long-term current use of insulin ([facility])       Lab Results   Component Value Date    HGBA1C 6.2 (H) 02/21/2024     Well-controlled  Continue metformin 1000 mg twice daily  Up-to-date with diabetic foot exam  Due for diabetic eye exam         Relevant Orders    Hemoglobin A1C    Type 2 diabetes mellitus with diabetic peripheral angiopathy without gangrene ([facility])       Cardiovascular and Mediastinum    Carotid stenosis, bilateral     Follows with vascular surgery for routine chronic VAS surveillance  Largely unchanged from prior         Benign essential hypertension     Well-controlled  Blood pressure is 122/78  Remains on losartan 50 mg         Relevant Orders    Comprehensive metabolic panel    Albumin / creatinine urine ratio       Nervous and Auditory    Hemiparesis affecting left side as late effect of cerebrovascular accident ([facility])     Patient had a recent fall off of a curb with head trauma  Had trauma evaluation which was unremarkable  Patient would benefit from follow-up with physical therapy for further evaluation and treatment as well as strengthening exercises         Relevant Orders    Ambulatory Referral to Physical Therapy    Epilepsy due to old stroke ([facility])     Well-controlled  No seizures in the last 10 years  Patient follows with neurology and is weaning off of Keppra            Other    Nicotine dependence with nicotine-induced disorder     Unchanged  Continues to smoke cigars  Did not tolerate nicotine gum  Has tried nicotine losenges  Is precontemplative         Depressive disorder     Patient's mood is great  Remains on sertraline 100 mg         Mixed hyperlipidemia     Lab Results   Component Value Date    LDLCALC 59 08/18/2023     Stable  Continue atorvastatin 20mg         Relevant Orders    Lipid panel    Major depressive disorder with single episode,  in partial remission (HCC)     Well-controlled on present regimen         Fall     Patient had a mechanical fall due to chronic left-sided weakness and foot drop  Recommend physical therapy for strengthening         Relevant Orders    Ambulatory Referral to Physical Therapy   Other Visit Diagnoses     Medicare annual wellness visit, subsequent    -  Primary    Screening for prostate cancer        Relevant Orders    PSA, Total Screen            Depression Screening and Follow-up Plan: Patient was screened for depression during today's encounter. They screened negative with a PHQ-9 score of 0.    Tobacco Cessation Counseling: Tobacco cessation counseling was provided. The patient is sincerely urged to quit consumption of tobacco. He is ready to quit tobacco.       Preventive health issues were discussed with patient, and age appropriate screening tests were ordered as noted in patient's After Visit Summary.  Personalized health advice and appropriate referrals for health education or preventive services given if needed, as noted in patient's After Visit Summary.     History of Present Illness:     Patient presents for a Medicare Wellness Visit and routine followup. He is weaning off of keppra.     He also had a fall after tripping on a curb. Had evaluation with ED and trauma workup which was unremarkable. He     HPI   Patient Care Team:  Anatoly Samuels DO as PCP - General (Family Medicine)  Weston Almonte MD as PCP - [facility]  [facility]  Alonso Irwin MD     Review of Systems:     Review of Systems   Constitutional:  Negative for activity change, chills, diaphoresis and fever.   HENT:  Negative for ear pain, hearing loss, postnasal drip, rhinorrhea, sinus pressure, sinus pain, sneezing and sore throat.    Respiratory:  Negative for cough, chest tightness, shortness of breath and wheezing.    Cardiovascular:  Negative for chest pain, palpitations and  leg swelling.   Gastrointestinal:  Negative for abdominal pain, blood in stool, constipation, diarrhea, nausea and vomiting.   Genitourinary:  Negative for dysuria, frequency, hematuria and urgency.   Musculoskeletal:  Negative for arthralgias and myalgias.   Neurological:  Negative for dizziness, syncope, weakness, light-headedness, numbness and headaches.      Problem List:     Patient Active Problem List   Diagnosis   • Hemiparesis affecting left side as late effect of cerebrovascular accident ([facility])   • Carotid stenosis, bilateral   • Nicotine dependence with nicotine-induced disorder   • Benign essential hypertension   • Depressive disorder   • Type 2 diabetes mellitus with circulatory disorder, without long-term current use of insulin ([facility])   • Generalized anxiety disorder   • History of cerebrovascular accident with residual deficit   • Mixed hyperlipidemia   • Overweight   • Patent foramen ovale   • Hyperplastic colonic polyp   • Benign prostatic hyperplasia without lower urinary tract symptoms   • Major depressive disorder with single episode, in partial remission ([facility])   • Adjustment disorder with depressed mood   • Epilepsy due to old stroke ([facility])   • Type 2 diabetes mellitus with diabetic peripheral angiopathy without gangrene ([facility])   • Fall      Past Medical and Surgical History:     Past Medical History:   Diagnosis Date   • CAD (coronary artery disease) 01/15/2014    100% occlusion of right   • CVA (cerebral vascular accident) ([facility]) 01/15/2014   • Depressive disorder    • Diabetes ([facility])    • Dyslipidemia    • Erectile dysfunction 2009   • Hemiparesis ([facility]) 01/15/2014    left   • Hypertension    • Seizure ([facility]) 12/14/2014   • Stroke ([facility])      Past Surgical History:   Procedure Laterality Date   • COLONOSCOPY  10/27/2016    1 cm cecal polyp and 2 other polyps tubular adenomas.  Multiple small polyps in rectum fulgurated by Dr. Oliva   • NO PAST SURGERIES        Family History:     Family History   Problem  Relation Age of Onset   • Cancer Mother         unknown   • Diabetes Mother    • Diabetes Father    • Diabetes Brother    • Hypertension Family    • Diabetes Family    • Pancreatic cancer Cousin       Social History:     Social History     Socioeconomic History   • Marital status: Single     Spouse name: None   • Number of children: None   • Years of education: None   • Highest education level: None   Occupational History   • Occupation: Disabled   Tobacco Use   • Smoking status: Every Day     Current packs/day: 0.00     Types: Cigars, Cigarettes     Last attempt to quit: 1/1/2014     Years since quitting: 10.1   • Smokeless tobacco: Current   • Tobacco comments:     heavy tob smoke - 3 cigars day - quit 1/1/14- no passive smoke exposure as per [employer]   Vaping Use   • Vaping status: Never Used   Substance and Sexual Activity   • Alcohol use: Yes     Comment: occasional   • Drug use: No   • Sexual activity: Not Currently   Other Topics Concern   • None   Social History Narrative   • None     Social Determinants of Health     Financial Resource Strain: Medium Risk (2/28/2024)    Overall Financial Resource Strain (CARDIA)    • Difficulty of Paying Living Expenses: Somewhat hard   Food Insecurity: No Food Insecurity (9/25/2023)    Received from [facility]    Hunger Vital Sign    • Worried About Running Out of Food in the Last Year: Never true    • Ran Out of Food in the Last Year: Never true   Transportation Needs: Unmet Transportation Needs (2/28/2024)    PRAPARE - Transportation    • Lack of Transportation (Medical): Yes    • Lack of Transportation (Non-Medical): Yes   Physical Activity: Not on file   Stress: Not on file   Social Connections: Not on file   Intimate Partner Violence: Not on file   Housing Stability: Not on file      Medications and Allergies:     Current Outpatient Medications   Medication Sig Dispense Refill   • aspirin 81 mg chewable tablet Chew 1 tablet every 24 hours     • atorvastatin (LIPITOR) 20  mg tablet TAKE 1 TABLET BY MOUTH EVERY DAY 90 tablet 2   • ezetimibe (ZETIA) 10 mg tablet TAKE 1 TABLET BY MOUTH EVERY DAY 90 tablet 1   • glucose blood test strip Use as instructed  ONE TOUCH ULTRA 100 each 5   • Lancets (onetouch ultrasoft) lancets Use daily 90 each 1   • losartan (COZAAR) 50 mg tablet TAKE 1 TABLET BY MOUTH EVERY DAY 90 tablet 3   • melatonin 3 mg Take 3 mg by mouth daily at bedtime     • metFORMIN (GLUCOPHAGE) 1000 MG tablet TAKE 1 TABLET BY MOUTH TWICE A DAY WITH MEALS 180 tablet 2   • OneTouch Ultra test strip USE 1 EACH IN THE MORNING USE AS INSTRUCTED 100 strip 3   • sertraline (ZOLOFT) 100 mg tablet TAKE 1 TABLET BY MOUTH EVERY DAY 90 tablet 1   • triamcinolone (KENALOG) 0.1 % cream Apply topically 2 (two) times a day 30 g 0   • bisacodyl (DULCOLAX) 10 mg suppository Insert 1 suppository (10 mg total) into the rectum daily (Patient not taking: Reported on 7/5/2023) 12 suppository 0   • senna-docusate sodium (SENOKOT-S) 8.6-50 mg per tablet Take 1 tablet by mouth daily (Patient not taking: Reported on 12/20/2023) 7 tablet 0     No current facility-administered medications for this visit.     Allergies   Allergen Reactions   • No Known Allergies       Immunizations:     Immunization History   Administered Date(s) Administered   • COVID-19 MODERNA VACC 0.25 ML IM BOOSTER 01/05/2022   • COVID-19 MODERNA VACC 0.5 ML IM 04/15/2021, 05/19/2021, 01/05/2022   • INFLUENZA 09/16/2015, 09/26/2016, 09/26/2016, 09/14/2017, 09/14/2017, 11/12/2018, 09/18/2019, 02/18/2022   • Influenza, injectable, quadrivalent, preservative free 0.5 mL 09/16/2019, 10/27/2023   • Influenza, recombinant, quadrivalent,injectable, preservative free 11/12/2018, 02/17/2021, 02/18/2022   • Influenza, seasonal, injectable 12/15/2014, 09/16/2015, 09/18/2019   • Pneumococcal Conjugate Vaccine 20-valent (Pcv20), Polysace 08/23/2023   • Pneumococcal Polysaccharide PPV23 01/22/2020   • Tdap 09/14/2012, 11/15/2023      Health  Maintenance:         Topic Date Due   • Colorectal Cancer Screening  10/27/2019   • HIV Screening  Completed   • Hepatitis C Screening  Completed         Topic Date Due   • Hepatitis A Vaccine (1 of 2 - Risk 2-dose series) Never done   • COVID-19 Vaccine (5 - 2023-24 season) 09/01/2023      Medicare Screening Tests and Risk Assessments:     Zeyad is here for his Subsequent Wellness visit. Last Medicare Wellness visit information reviewed, patient interviewed and updates made to the record today.      Health Risk Assessment:   Patient rates overall health as very good. Patient feels that their physical health rating is same. Patient is very satisfied with their life. Eyesight was rated as same. Hearing was rated as same. Patient feels that their emotional and mental health rating is same. Patients states they are never, rarely angry. Patient states they are never, rarely unusually tired/fatigued. Pain experienced in the last 7 days has been a lot. Patient's pain rating has been 9/10. Patient states that he has experienced no weight loss or gain in last 6 months.     Depression Screening:   PHQ-9 Score: 0      Fall Risk Screening:   In the past year, patient has experienced: history of falling in past year    Number of falls: 1  Injured during fall?: Yes    Feels unsteady when standing or walking?: Yes    Worried about falling?: No      Home Safety:  Patient does not have trouble with stairs inside or outside of their home. Patient has working smoke alarms and has working carbon monoxide detector. Home safety hazards include: none.     Nutrition:   Current diet is Regular and No Added Salt.     Medications:   Patient is currently taking over-the-counter supplements. OTC medications include: see medication list. Patient is able to manage medications.     Activities of Daily Living (ADLs)/Instrumental Activities of Daily Living (IADLs):   Walk and transfer into and out of bed and chair?: Yes  Dress and groom yourself?:  Yes    Bathe or shower yourself?: Yes    Feed yourself? Yes  Do your laundry/housekeeping?: Yes  Manage your money, pay your bills and track your expenses?: Yes  Make your own meals?: Yes    Do your own shopping?: Yes    Previous Hospitalizations:   Any hospitalizations or ED visits within the last 12 months?: Yes    How many hospitalizations have you had in the last year?: 1-2    Advance Care Planning:   Living will: No    Durable POA for healthcare: No    Advanced directive counseling given: Yes    End of Life Decisions reviewed with patient: Yes    Provider agrees with end of life decisions: Yes      Cognitive Screening:   Provider or family/friend/caregiver concerned regarding cognition?: No    PREVENTIVE SCREENINGS      Cardiovascular Screening:    General: Screening Not Indicated and History Lipid Disorder      Diabetes Screening:     General: Screening Not Indicated and History Diabetes      Colorectal Cancer Screening:     General: Screening Current      Prostate Cancer Screening:    General: Screening Current      Osteoporosis Screening:    General: Screening Not Indicated      Abdominal Aortic Aneurysm (AAA) Screening:    Risk factors include: tobacco use        General: Patient Declines      Lung Cancer Screening:     General: Screening Not Indicated      Hepatitis C Screening:    General: Screening Current    Screening, Brief Intervention, and Referral to Treatment (SBIRT)    Screening      Single Item Drug Screening:  How often have you used an illegal drug (including marijuana) or a prescription medication for non-medical reasons in the past year? never    Single Item Drug Screen Score: 0  Interpretation: Negative screen for possible drug use disorder    Other Counseling Topics:   Car/seat belt/driving safety, skin self-exam, sunscreen and regular weightbearing exercise and calcium and vitamin D intake.     No results found.     Physical Exam:     /78 (BP Location: Left arm, Patient Position:  "Sitting, Cuff Size: Adult)   Pulse 81   Temp 97.6 °F (36.4 °C) (Temporal)   Ht 5' 9.4\" (1.763 m)   Wt 81.2 kg (179 lb)   SpO2 97%   BMI 26.13 kg/m²     Physical Exam  Vitals reviewed.   Constitutional:       General: He is not in acute distress.     Appearance: He is well-developed. He is not diaphoretic.   HENT:      Head: Normocephalic and atraumatic.      Right Ear: Tympanic membrane, ear canal and external ear normal. There is no impacted cerumen.      Left Ear: Tympanic membrane, ear canal and external ear normal. There is no impacted cerumen.      Nose: Nose normal. No congestion.      Mouth/Throat:      Mouth: Mucous membranes are moist.      Pharynx: Oropharynx is clear.   Eyes:      General: No scleral icterus.        Right eye: No discharge.         Left eye: No discharge.      Conjunctiva/sclera: Conjunctivae normal.      Pupils: Pupils are equal, round, and reactive to light.   Neck:      Vascular: No JVD.   Cardiovascular:      Rate and Rhythm: Normal rate and regular rhythm.      Heart sounds: Normal heart sounds. No murmur heard.     No friction rub.   Pulmonary:      Effort: Pulmonary effort is normal. No respiratory distress.      Breath sounds: Normal breath sounds. No wheezing or rales.   Chest:      Chest wall: No tenderness.   Abdominal:      General: Bowel sounds are normal. There is no distension.      Palpations: Abdomen is soft. There is no mass.      Tenderness: There is no abdominal tenderness. There is no guarding or rebound.   Musculoskeletal:         General: No tenderness or deformity. Normal range of motion.      Cervical back: Normal range of motion and neck supple.   Skin:     General: Skin is warm and dry.      Findings: No erythema or rash.   Neurological:      Mental Status: He is alert and oriented to person, place, and time. Mental status is at baseline.      Cranial Nerves: No cranial nerve deficit.   Psychiatric:         Mood and Affect: Mood normal.          Anatoly " Chris Samuels, DO

## 2024-02-28 NOTE — ASSESSMENT & PLAN NOTE
Unchanged  Continues to smoke cigars  Did not tolerate nicotine gum  Has tried nicotine losenges  Is precontemplative

## 2024-02-28 NOTE — ASSESSMENT & PLAN NOTE
Lab Results   Component Value Date    LDLCALC 59 08/18/2023     Stable  Continue atorvastatin 20mg

## 2024-02-28 NOTE — ASSESSMENT & PLAN NOTE
Lab Results   Component Value Date    HGBA1C 6.2 (H) 02/21/2024     Well-controlled  Continue metformin 1000 mg twice daily  Up-to-date with diabetic foot exam  Due for diabetic eye exam

## 2024-04-20 DIAGNOSIS — E11.9 DIABETES MELLITUS WITHOUT COMPLICATION (HCC): ICD-10-CM

## 2024-04-20 DIAGNOSIS — F32.A DEPRESSIVE DISORDER: ICD-10-CM

## 2024-04-20 DIAGNOSIS — E78.49 OTHER HYPERLIPIDEMIA: ICD-10-CM

## 2024-04-20 RX ORDER — ATORVASTATIN CALCIUM 20 MG/1
TABLET, FILM COATED ORAL
Qty: 90 TABLET | Refills: 2 | Status: SHIPPED | OUTPATIENT
Start: 2024-04-20

## 2024-04-20 RX ORDER — SERTRALINE HYDROCHLORIDE 100 MG/1
TABLET, FILM COATED ORAL
Qty: 90 TABLET | Refills: 1 | Status: SHIPPED | OUTPATIENT
Start: 2024-04-20

## 2024-05-09 ENCOUNTER — TELEPHONE (OUTPATIENT)
Dept: ADMINISTRATIVE | Facility: OTHER | Age: 64
End: 2024-05-09

## 2024-05-09 NOTE — TELEPHONE ENCOUNTER
Left message for patient to top by our office to sign a release form or if he can stop by eye doctors office to  exam. And bring to our office.

## 2024-05-09 NOTE — TELEPHONE ENCOUNTER
Upon review of the In Basket request we have found/obtained the documentation. After careful review of the document we are unable to complete this request for Diabetic Eye Exam because the documentation does not have the proper verbiage (wording) needed to close the requested care gap(s).    Any additional questions or concerns should be emailed to the Practice Liaisons via the appropriate education email address, please do not reply via In Basket.    Thank you  Tyrese Wolfe MA

## 2024-05-09 NOTE — TELEPHONE ENCOUNTER
----- Message from Shaniqua Galvan MA sent at 5/8/2024  2:54 PM EDT -----  Regarding: Care Gap Request  05/08/24 2:54 PM    Hello, our patient attached above has had Diabetic Eye Exam completed/performed. Please assist in updating the patient chart by pulling the document from the Media Tab. The date of service is 01/10/24.     Thank you,  Shaniqua Galvan PG  PRIMARY CARE [place]

## 2024-07-16 DIAGNOSIS — E78.2 MIXED HYPERLIPIDEMIA: ICD-10-CM

## 2024-07-16 RX ORDER — EZETIMIBE 10 MG/1
TABLET ORAL
Qty: 90 TABLET | Refills: 1 | Status: SHIPPED | OUTPATIENT
Start: 2024-07-16

## 2024-07-28 ENCOUNTER — APPOINTMENT (OUTPATIENT)
Dept: LAB | Facility: HOSPITAL | Age: 64
End: 2024-07-28
Payer: COMMERCIAL

## 2024-07-28 DIAGNOSIS — E78.2 MIXED HYPERLIPIDEMIA: ICD-10-CM

## 2024-07-28 DIAGNOSIS — I10 BENIGN ESSENTIAL HYPERTENSION: ICD-10-CM

## 2024-07-28 DIAGNOSIS — Z12.5 SCREENING FOR PROSTATE CANCER: ICD-10-CM

## 2024-07-28 DIAGNOSIS — E11.59 TYPE 2 DIABETES MELLITUS WITH OTHER CIRCULATORY COMPLICATION, WITHOUT LONG-TERM CURRENT USE OF INSULIN (HCC): ICD-10-CM

## 2024-07-28 LAB
ALBUMIN SERPL BCG-MCNC: 4.3 G/DL (ref 3.5–5)
ALP SERPL-CCNC: 51 U/L (ref 34–104)
ALT SERPL W P-5'-P-CCNC: 22 U/L (ref 7–52)
ANION GAP SERPL CALCULATED.3IONS-SCNC: 5 MMOL/L (ref 4–13)
AST SERPL W P-5'-P-CCNC: 15 U/L (ref 13–39)
BILIRUB SERPL-MCNC: 0.67 MG/DL (ref 0.2–1)
BUN SERPL-MCNC: 12 MG/DL (ref 5–25)
CALCIUM SERPL-MCNC: 9.7 MG/DL (ref 8.4–10.2)
CHLORIDE SERPL-SCNC: 105 MMOL/L (ref 96–108)
CHOLEST SERPL-MCNC: 137 MG/DL
CO2 SERPL-SCNC: 28 MMOL/L (ref 21–32)
CREAT SERPL-MCNC: 0.5 MG/DL (ref 0.6–1.3)
EST. AVERAGE GLUCOSE BLD GHB EST-MCNC: 134 MG/DL
GFR SERPL CREATININE-BSD FRML MDRD: 116 ML/MIN/1.73SQ M
GLUCOSE P FAST SERPL-MCNC: 135 MG/DL (ref 65–99)
HBA1C MFR BLD: 6.3 %
HDLC SERPL-MCNC: 42 MG/DL
LDLC SERPL CALC-MCNC: 73 MG/DL (ref 0–100)
NONHDLC SERPL-MCNC: 95 MG/DL
POTASSIUM SERPL-SCNC: 4.1 MMOL/L (ref 3.5–5.3)
PROT SERPL-MCNC: 7.1 G/DL (ref 6.4–8.4)
PSA SERPL-MCNC: 1.6 NG/ML (ref 0–4)
SODIUM SERPL-SCNC: 138 MMOL/L (ref 135–147)
TRIGL SERPL-MCNC: 110 MG/DL

## 2024-07-28 PROCEDURE — G0103 PSA SCREENING: HCPCS

## 2024-07-28 PROCEDURE — 83036 HEMOGLOBIN GLYCOSYLATED A1C: CPT

## 2024-07-28 PROCEDURE — 36415 COLL VENOUS BLD VENIPUNCTURE: CPT

## 2024-07-28 PROCEDURE — 80061 LIPID PANEL: CPT

## 2024-07-28 PROCEDURE — 80053 COMPREHEN METABOLIC PANEL: CPT

## 2024-07-30 ENCOUNTER — APPOINTMENT (OUTPATIENT)
Dept: LAB | Facility: HOSPITAL | Age: 64
End: 2024-07-30
Payer: COMMERCIAL

## 2024-07-30 LAB
CREAT UR-MCNC: 52.2 MG/DL
MICROALBUMIN UR-MCNC: 7.3 MG/L
MICROALBUMIN/CREAT 24H UR: 14 MG/G CREATININE (ref 0–30)

## 2024-07-30 PROCEDURE — 82043 UR ALBUMIN QUANTITATIVE: CPT

## 2024-07-30 PROCEDURE — 82570 ASSAY OF URINE CREATININE: CPT

## 2024-08-07 ENCOUNTER — RA CDI HCC (OUTPATIENT)
Dept: OTHER | Facility: HOSPITAL | Age: 64
End: 2024-08-07

## 2024-08-21 ENCOUNTER — OFFICE VISIT (OUTPATIENT)
Dept: FAMILY MEDICINE CLINIC | Facility: CLINIC | Age: 64
End: 2024-08-21
Payer: COMMERCIAL

## 2024-08-21 ENCOUNTER — TELEPHONE (OUTPATIENT)
Age: 64
End: 2024-08-21

## 2024-08-21 VITALS
BODY MASS INDEX: 26.43 KG/M2 | DIASTOLIC BLOOD PRESSURE: 70 MMHG | OXYGEN SATURATION: 97 % | SYSTOLIC BLOOD PRESSURE: 110 MMHG | TEMPERATURE: 98 F | HEART RATE: 75 BPM | HEIGHT: 69 IN

## 2024-08-21 DIAGNOSIS — I65.23 CAROTID STENOSIS, BILATERAL: ICD-10-CM

## 2024-08-21 DIAGNOSIS — G40.909 EPILEPSY DUE TO OLD STROKE (HCC): ICD-10-CM

## 2024-08-21 DIAGNOSIS — I69.398 EPILEPSY DUE TO OLD STROKE (HCC): ICD-10-CM

## 2024-08-21 DIAGNOSIS — L30.9 ECZEMA, UNSPECIFIED TYPE: ICD-10-CM

## 2024-08-21 DIAGNOSIS — F17.299 OTHER TOBACCO PRODUCT NICOTINE DEPENDENCE WITH NICOTINE-INDUCED DISORDER: ICD-10-CM

## 2024-08-21 DIAGNOSIS — E11.51 TYPE 2 DIABETES MELLITUS WITH DIABETIC PERIPHERAL ANGIOPATHY WITHOUT GANGRENE, WITHOUT LONG-TERM CURRENT USE OF INSULIN (HCC): Primary | ICD-10-CM

## 2024-08-21 DIAGNOSIS — I10 BENIGN ESSENTIAL HYPERTENSION: ICD-10-CM

## 2024-08-21 DIAGNOSIS — F32.4 MAJOR DEPRESSIVE DISORDER WITH SINGLE EPISODE, IN PARTIAL REMISSION (HCC): ICD-10-CM

## 2024-08-21 DIAGNOSIS — E78.2 MIXED HYPERLIPIDEMIA: ICD-10-CM

## 2024-08-21 DIAGNOSIS — I69.354 HEMIPARESIS AFFECTING LEFT SIDE AS LATE EFFECT OF CEREBROVASCULAR ACCIDENT (HCC): ICD-10-CM

## 2024-08-21 PROCEDURE — 99214 OFFICE O/P EST MOD 30 MIN: CPT | Performed by: FAMILY MEDICINE

## 2024-08-21 PROCEDURE — G2211 COMPLEX E/M VISIT ADD ON: HCPCS | Performed by: FAMILY MEDICINE

## 2024-08-21 RX ORDER — FLUOCINONIDE 1 MG/G
CREAM TOPICAL 2 TIMES DAILY PRN
Qty: 60 G | Refills: 0 | Status: SHIPPED | OUTPATIENT
Start: 2024-08-21

## 2024-08-21 RX ORDER — TRIAMCINOLONE ACETONIDE 1 MG/G
CREAM TOPICAL 2 TIMES DAILY
Qty: 30 G | Refills: 0 | Status: SHIPPED | OUTPATIENT
Start: 2024-08-21 | End: 2024-08-21

## 2024-08-21 RX ORDER — LEVETIRACETAM 500 MG/1
500 TABLET ORAL DAILY
Qty: 90 TABLET | Refills: 1 | Status: SHIPPED | OUTPATIENT
Start: 2024-08-21

## 2024-08-21 NOTE — PROGRESS NOTES
Assessment/Plan:      1. Type 2 diabetes mellitus with diabetic peripheral angiopathy without gangrene, without long-term current use of insulin ([facility])  Assessment & Plan:    Lab Results   Component Value Date    HGBA1C 6.3 (H) 07/28/2024     Overall stable  Continue metformin 1000 mg twice daily  Up-to-date with diabetic foot and eye exams  Renal function is stable  Orders:  -     Hemoglobin A1C; Future; Expected date: 02/21/2025  2. Eczema, unspecified type  -     Fluocinonide 0.1 % CREA; Apply topically 2 (two) times a day as needed (rash)  3. Carotid stenosis, bilateral  Assessment & Plan:  Follows with Dr. Joe Akhtar with surveillance vas duplex  4. Benign essential hypertension  Assessment & Plan:  Well-controlled  Blood pressure is 110/70  Patient is at goal less than 130/80  Continue losartan 50 mg  Orders:  -     Comprehensive metabolic panel; Future; Expected date: 02/21/2025  5. Hemiparesis affecting left side as late effect of cerebrovascular accident ([facility])  Assessment & Plan:  Chronic and unchanged  Continues to follow with neurology, Dr. 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 Major depressive disorder with single episode, in partial remission ([facility])  Assessment & Plan:  Waxes and wanes  Patient has more good days and bad days  Denies suicidal ideations  Continue Zoloft 100 mg daily  7. Other tobacco product nicotine dependence with nicotine-induced disorder  Assessment & Plan:  Continues to smoke 5 small cigars daily  Encouraged patient on complete cessation  Does not want to quit at this time  8. Epilepsy due to old stroke ([facility])  Assessment & Plan:  Under the care of neurology  Remains on Keppra 500 mg daily  No seizures in the last 10 years  Plan to wean off Keppra  Orders:  -     levETIRAcetam (Keppra) 500 mg tablet; Take 1 tablet (500 mg total) by mouth daily  9. Mixed hyperlipidemia  Assessment & Plan:  Stable  Continue Lipitor 20 mg  "daily  Orders:  -     Lipid panel; Future; Expected date: 02/21/2025          Subjective:      Patient ID: Zeyad Anaya is a 64 y.o. male presents today for routine follow up. He has no concerns.     HPI    The following portions of the patient's history were reviewed and updated as appropriate: allergies, current medications, past family history, past medical history, past social history, past surgical history, and problem list.    Review of Systems   Constitutional:  Negative for activity change, chills, diaphoresis and fever.   HENT:  Negative for ear pain, hearing loss, postnasal drip, rhinorrhea, sinus pressure, sinus pain, sneezing and sore throat.    Respiratory:  Negative for cough, chest tightness, shortness of breath and wheezing.    Cardiovascular:  Negative for chest pain, palpitations and leg swelling.   Gastrointestinal:  Negative for abdominal pain, blood in stool, constipation, diarrhea, nausea and vomiting.   Genitourinary:  Negative for dysuria, frequency, hematuria and urgency.   Musculoskeletal:  Negative for arthralgias and myalgias.   Skin:  Positive for rash.   Neurological:  Negative for dizziness, syncope, weakness, light-headedness, numbness and headaches.         Objective:      /70 (BP Location: Left arm, Patient Position: Sitting, Cuff Size: Standard)   Pulse 75   Temp 98 °F (36.7 °C) (Temporal)   Ht 5' 9\" (1.753 m)   SpO2 97%   BMI 26.43 kg/m²          Physical Exam  Vitals reviewed.   Constitutional:       General: He is not in acute distress.     Appearance: He is well-developed. He is not diaphoretic.   HENT:      Head: Normocephalic and atraumatic.      Right Ear: External ear normal.      Left Ear: External ear normal.      Nose: Nose normal. No congestion.      Mouth/Throat:      Mouth: Mucous membranes are moist.      Pharynx: Oropharynx is clear. No oropharyngeal exudate.   Eyes:      General: No scleral icterus.     Pupils: Pupils are equal, round, and reactive to " light.   Neck:      Thyroid: No thyromegaly.      Vascular: No JVD.      Trachea: No tracheal deviation.   Cardiovascular:      Rate and Rhythm: Normal rate and regular rhythm.      Pulses: Normal pulses.      Heart sounds: Normal heart sounds. No murmur heard.     No friction rub.   Pulmonary:      Effort: Pulmonary effort is normal. No respiratory distress.      Breath sounds: Normal breath sounds. No wheezing or rales.   Chest:      Chest wall: No tenderness.   Abdominal:      General: Bowel sounds are normal. There is no distension.      Palpations: Abdomen is soft.      Tenderness: There is no abdominal tenderness. There is no right CVA tenderness, left CVA tenderness, guarding or rebound.      Comments: Reducible Umbilical hernia    Musculoskeletal:         General: No tenderness. Normal range of motion.      Cervical back: Normal range of motion and neck supple. No tenderness.      Right lower leg: No edema.      Left lower leg: No edema.   Lymphadenopathy:      Cervical: No cervical adenopathy.   Skin:     General: Skin is warm and dry.      Comments: Erythematous dry cracked lesions on the right hand at palmar creases   Neurological:      Mental Status: He is alert and oriented to person, place, and time. Mental status is at baseline.      Deep Tendon Reflexes: Reflexes are normal and symmetric.   Psychiatric:         Mood and Affect: Mood normal.         Behavior: Behavior normal.         Thought Content: Thought content normal.         Judgment: Judgment normal.

## 2024-08-22 NOTE — ASSESSMENT & PLAN NOTE
Under the care of neurology  Remains on Keppra 500 mg daily  No seizures in the last 10 years  Plan to wean off Keppra

## 2024-08-22 NOTE — ASSESSMENT & PLAN NOTE
Lab Results   Component Value Date    HGBA1C 6.3 (H) 07/28/2024     Overall stable  Continue metformin 1000 mg twice daily  Up-to-date with diabetic foot and eye exams  Renal function is stable

## 2024-08-22 NOTE — ASSESSMENT & PLAN NOTE
Chronic and unchanged  Continues to follow with neurology, Dr. 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

## 2024-08-22 NOTE — ASSESSMENT & PLAN NOTE
Well-controlled  Blood pressure is 110/70  Patient is at goal less than 130/80  Continue losartan 50 mg

## 2024-08-22 NOTE — ASSESSMENT & PLAN NOTE
Waxes and wanes  Patient has more good days and bad days  Denies suicidal ideations  Continue Zoloft 100 mg daily

## 2024-08-22 NOTE — ASSESSMENT & PLAN NOTE
Continues to smoke 5 small cigars daily  Encouraged patient on complete cessation  Does not want to quit at this time

## 2024-08-26 NOTE — TELEPHONE ENCOUNTER
PA foR FLUOCINONIDE 0.1% CREA SUBMITTED     via    [x]CMM-KEY:  [id number]  []Surescripts-Case ID   []Faxed to plan   []Other website    []Phone call Case ID #      Office notes sent, clinical questions answered. Awaiting determination    Turnaround time for your insurance to make a decision on your Prior Authorization can take 7-21 business days.

## 2024-09-05 ENCOUNTER — TELEPHONE (OUTPATIENT)
Dept: NEUROLOGY | Facility: CLINIC | Age: 64
End: 2024-09-05

## 2024-09-05 NOTE — TELEPHONE ENCOUNTER
Spoke to pt regarding upcoming appt with kiki 12/11/24, she is no longer in office and I offered to move appt to be with Precious Gonzalez.  He said he'd rather call us at a later date to make an appt

## 2024-09-30 ENCOUNTER — VBI (OUTPATIENT)
Dept: ADMINISTRATIVE | Facility: OTHER | Age: 64
End: 2024-09-30

## 2024-09-30 NOTE — TELEPHONE ENCOUNTER
09/30/24 10:08 AM     Chart reviewed for Statin Therapy for Patients with Cardiovascular Disease (Statin Adherence) was/were not submitted to the patient's insurance.     Quyen Merritt MA   [facility]

## 2024-10-01 ENCOUNTER — TELEPHONE (OUTPATIENT)
Dept: FAMILY MEDICINE CLINIC | Facility: CLINIC | Age: 64
End: 2024-10-01

## 2024-10-01 DIAGNOSIS — I10 BENIGN ESSENTIAL HYPERTENSION: ICD-10-CM

## 2024-10-01 RX ORDER — LOSARTAN POTASSIUM 50 MG/1
TABLET ORAL
Qty: 90 TABLET | Refills: 1 | Status: SHIPPED | OUTPATIENT
Start: 2024-10-01

## 2024-10-13 DIAGNOSIS — F32.A DEPRESSIVE DISORDER: ICD-10-CM

## 2024-10-14 RX ORDER — SERTRALINE HYDROCHLORIDE 100 MG/1
TABLET, FILM COATED ORAL
Qty: 90 TABLET | Refills: 1 | Status: SHIPPED | OUTPATIENT
Start: 2024-10-14

## 2024-11-18 ENCOUNTER — TELEPHONE (OUTPATIENT)
Dept: NEUROLOGY | Facility: CLINIC | Age: 64
End: 2024-11-18

## 2024-11-18 NOTE — TELEPHONE ENCOUNTER
Called pt to r/s appt but pt stated he will call back to r/s appt with mago as she will not be in office at time of appt and will be in [place]

## 2024-11-29 ENCOUNTER — CLINICAL SUPPORT (OUTPATIENT)
Dept: FAMILY MEDICINE CLINIC | Facility: CLINIC | Age: 64
End: 2024-11-29
Payer: COMMERCIAL

## 2024-11-29 ENCOUNTER — OFFICE VISIT (OUTPATIENT)
Dept: NEUROLOGY | Facility: CLINIC | Age: 64
End: 2024-11-29
Payer: COMMERCIAL

## 2024-11-29 VITALS
TEMPERATURE: 97.4 F | OXYGEN SATURATION: 98 % | WEIGHT: 180.5 LBS | HEART RATE: 76 BPM | BODY MASS INDEX: 26.66 KG/M2 | SYSTOLIC BLOOD PRESSURE: 118 MMHG | DIASTOLIC BLOOD PRESSURE: 62 MMHG

## 2024-11-29 DIAGNOSIS — G40.909 EPILEPSY DUE TO OLD STROKE (HCC): Primary | ICD-10-CM

## 2024-11-29 DIAGNOSIS — I69.398 EPILEPSY DUE TO OLD STROKE (HCC): Primary | ICD-10-CM

## 2024-11-29 DIAGNOSIS — Z23 ENCOUNTER FOR IMMUNIZATION: Primary | ICD-10-CM

## 2024-11-29 DIAGNOSIS — I65.23 CAROTID STENOSIS, BILATERAL: ICD-10-CM

## 2024-11-29 DIAGNOSIS — Z23 NEED FOR COVID-19 VACCINE: ICD-10-CM

## 2024-11-29 PROCEDURE — 91320 SARSCV2 VAC 30MCG TRS-SUC IM: CPT

## 2024-11-29 PROCEDURE — 99213 OFFICE O/P EST LOW 20 MIN: CPT | Performed by: NURSE PRACTITIONER

## 2024-11-29 PROCEDURE — NURSE

## 2024-11-29 PROCEDURE — 90480 ADMN SARSCOV2 VAC 1/ONLY CMP: CPT

## 2024-11-29 PROCEDURE — 90471 IMMUNIZATION ADMIN: CPT

## 2024-11-29 PROCEDURE — 90673 RIV3 VACCINE NO PRESERV IM: CPT

## 2024-11-29 NOTE — PATIENT INSTRUCTIONS
Have carotid ultrasound done.    Ok to stop Keppra    Notify office if you experience any seizures.

## 2024-11-29 NOTE — PROGRESS NOTES
Review of Systems   Constitutional:  Negative for appetite change, fatigue and fever.   HENT: Negative.  Negative for hearing loss, tinnitus, trouble swallowing and voice change.    Eyes: Negative.  Negative for photophobia, pain and visual disturbance.   Respiratory: Negative.  Negative for shortness of breath.    Cardiovascular: Negative.  Negative for palpitations.   Gastrointestinal: Negative.  Negative for nausea and vomiting.   Endocrine: Negative.  Negative for cold intolerance.   Genitourinary: Negative.  Negative for dysuria, frequency and urgency.   Musculoskeletal:  Negative for back pain, gait problem, myalgias, neck pain and neck stiffness.   Skin: Negative.  Negative for rash.   Allergic/Immunologic: Negative.    Neurological: Negative.  Negative for dizziness, tremors, seizures, syncope, facial asymmetry, speech difficulty, weakness, light-headedness, numbness and headaches.   Hematological: Negative.  Does not bruise/bleed easily.   Psychiatric/Behavioral: Negative.  Negative for confusion, hallucinations and sleep disturbance.    All other systems reviewed and are negative.     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 male, who is presenting to [facility] for follow-up of his seizures.     is accompanied by     L side spasticity   Fell about a year ago.   Wants tow patrick LEV.  Carotid us    Current Antiepileptic Medications:  1. Levetiracetam 500mg daily   Other medications as per Epic      Event/Seizure Semiology:  1.         Prior AEDs:        Prior Evaluation:  -      Social History:  Driving:  Working:    I reviewed Allergies,  Medical History, Surgical History,  Family History and problem list as documented in Epic/Care Everywhere.     Review of Systems:  Constitutional:  Negative for appetite change, fatigue and fever.   HENT: Negative for hearing loss, tinnitus, trouble swallowing and voice change.    Eyes: Negative for photophobia, pain and visual disturbance.   Respiratory: Negative for shortness of breath.    Cardiovascular: Negative for palpitations.   Gastrointestinal: Negative for nausea and vomiting.   Endocrine: Negative for cold intolerance.   Genitourinary: Negative for dysuria, frequency and urgency.   Musculoskeletal:  Negative for back pain, gait problem, myalgias, neck pain and neck stiffness.   Skin: Negative for rash.   Allergic/Immunologic: Negative for hives.  Neurological: Negative for dizziness, tremors, syncope, speech difficulty, weakness, light-headedness, numbness and headaches.   Hematological: Negative for easy bruising and bleeding  Psychiatric/Behavioral: Negative for confusion, hallucinations and sleep disturbance.         Objective   /62 (BP Location: Right arm, Patient Position: Sitting, Cuff Size: Adult)   Pulse 76   Temp (!) 97.4 °F (36.3 °C) (Temporal)   Wt 81.9 kg (180 lb 8 oz)   SpO2 98%   BMI 26.66 kg/m²      General Exam  In general, patient is well appearing and in no distress.    Neurologic Exam  Mental Status: Alert and oriented x 3  Language: normal fluency and comprehension  Cranial Nerves:  PERRL, EOMI, no nystagmus, Face symmetric, Tongue/palate midline, No dysarthria   Motor: No pronator drift. Normal bulk and tone. Strength 5/5 throughout  DTRs: Normal and symmetric  Coordination: Finger to nose intact  Gait: normal casual gait, able to tandem walk without difficulty  Romberg negative      Administrative Statements     I spent a total of min with the patient and completing documentation on the day of the encounter. This time was spent specifically discussing the patient's  diagnosis,  and plan as detailed above.    Voice recognition software was used in the generation of this note. There may be unintentional errors including grammatical errors, spelling errors, or pronoun errors.

## 2025-01-12 DIAGNOSIS — E11.9 DIABETES MELLITUS WITHOUT COMPLICATION (HCC): ICD-10-CM

## 2025-01-12 DIAGNOSIS — E78.49 OTHER HYPERLIPIDEMIA: ICD-10-CM

## 2025-01-12 DIAGNOSIS — E78.2 MIXED HYPERLIPIDEMIA: ICD-10-CM

## 2025-01-12 RX ORDER — ATORVASTATIN CALCIUM 20 MG/1
20 TABLET, FILM COATED ORAL DAILY
Qty: 90 TABLET | Refills: 1 | Status: SHIPPED | OUTPATIENT
Start: 2025-01-12

## 2025-01-12 RX ORDER — EZETIMIBE 10 MG/1
10 TABLET ORAL DAILY
Qty: 90 TABLET | Refills: 1 | Status: SHIPPED | OUTPATIENT
Start: 2025-01-12

## 2025-02-04 ENCOUNTER — RA CDI HCC (OUTPATIENT)
Dept: OTHER | Facility: HOSPITAL | Age: 65
End: 2025-02-04

## 2025-02-05 ENCOUNTER — APPOINTMENT (OUTPATIENT)
Dept: LAB | Facility: HOSPITAL | Age: 65
End: 2025-02-05
Payer: COMMERCIAL

## 2025-02-05 DIAGNOSIS — E11.51 TYPE 2 DIABETES MELLITUS WITH DIABETIC PERIPHERAL ANGIOPATHY WITHOUT GANGRENE, WITHOUT LONG-TERM CURRENT USE OF INSULIN (HCC): ICD-10-CM

## 2025-02-05 DIAGNOSIS — E78.2 MIXED HYPERLIPIDEMIA: ICD-10-CM

## 2025-02-05 DIAGNOSIS — I10 BENIGN ESSENTIAL HYPERTENSION: ICD-10-CM

## 2025-02-05 LAB
ALBUMIN SERPL BCG-MCNC: 4.2 G/DL (ref 3.5–5)
ALP SERPL-CCNC: 56 U/L (ref 34–104)
ALT SERPL W P-5'-P-CCNC: 21 U/L (ref 7–52)
ANION GAP SERPL CALCULATED.3IONS-SCNC: 4 MMOL/L (ref 4–13)
AST SERPL W P-5'-P-CCNC: 14 U/L (ref 13–39)
BILIRUB SERPL-MCNC: 0.55 MG/DL (ref 0.2–1)
BUN SERPL-MCNC: 11 MG/DL (ref 5–25)
CALCIUM SERPL-MCNC: 9.9 MG/DL (ref 8.4–10.2)
CHLORIDE SERPL-SCNC: 106 MMOL/L (ref 96–108)
CHOLEST SERPL-MCNC: 144 MG/DL (ref ?–200)
CO2 SERPL-SCNC: 30 MMOL/L (ref 21–32)
CREAT SERPL-MCNC: 0.48 MG/DL (ref 0.6–1.3)
EST. AVERAGE GLUCOSE BLD GHB EST-MCNC: 131 MG/DL
GFR SERPL CREATININE-BSD FRML MDRD: 118 ML/MIN/1.73SQ M
GLUCOSE P FAST SERPL-MCNC: 137 MG/DL (ref 65–99)
HBA1C MFR BLD: 6.2 %
HDLC SERPL-MCNC: 46 MG/DL
LDLC SERPL CALC-MCNC: 77 MG/DL (ref 0–100)
NONHDLC SERPL-MCNC: 98 MG/DL
POTASSIUM SERPL-SCNC: 4.7 MMOL/L (ref 3.5–5.3)
PROT SERPL-MCNC: 6.6 G/DL (ref 6.4–8.4)
SODIUM SERPL-SCNC: 140 MMOL/L (ref 135–147)
TRIGL SERPL-MCNC: 106 MG/DL (ref ?–150)

## 2025-02-05 PROCEDURE — 80053 COMPREHEN METABOLIC PANEL: CPT

## 2025-02-05 PROCEDURE — 83036 HEMOGLOBIN GLYCOSYLATED A1C: CPT

## 2025-02-05 PROCEDURE — 36415 COLL VENOUS BLD VENIPUNCTURE: CPT

## 2025-02-05 PROCEDURE — 80061 LIPID PANEL: CPT

## 2025-02-05 NOTE — PROGRESS NOTES
HCC coding opportunities          Chart Reviewed number of suggestions sent to Provider: 1   E11.42    Patients Insurance     Medicare Insurance: [employer]age

## 2025-02-12 ENCOUNTER — HOSPITAL ENCOUNTER (OUTPATIENT)
Dept: NON INVASIVE DIAGNOSTICS | Facility: HOSPITAL | Age: 65
Discharge: HOME | End: 2025-02-12
Payer: COMMERCIAL

## 2025-02-12 DIAGNOSIS — I65.23 CAROTID STENOSIS, BILATERAL: ICD-10-CM

## 2025-02-12 PROCEDURE — 93880 EXTRACRANIAL BILAT STUDY: CPT | Performed by: SURGERY

## 2025-02-12 PROCEDURE — 93880 EXTRACRANIAL BILAT STUDY: CPT

## 2025-02-13 ENCOUNTER — TELEPHONE (OUTPATIENT)
Dept: VASCULAR SURGERY | Facility: CLINIC | Age: 65
End: 2025-02-13

## 2025-02-13 NOTE — TELEPHONE ENCOUNTER
Spoke with pt to schedule Consult OV, LTFU with any provider in [place] per DELFINA. Pt stated he will give us a call back today once he has looked at his schedule.

## 2025-02-14 NOTE — TELEPHONE ENCOUNTER
Called pt and left msg for pt to be scheduled. Pt needs new patient appt or carotid stenosis bilateral CV done 02/12/25

## 2025-02-20 ENCOUNTER — OFFICE VISIT (OUTPATIENT)
Dept: FAMILY MEDICINE CLINIC | Facility: CLINIC | Age: 65
End: 2025-02-20
Payer: COMMERCIAL

## 2025-02-20 VITALS
WEIGHT: 180 LBS | OXYGEN SATURATION: 99 % | SYSTOLIC BLOOD PRESSURE: 112 MMHG | BODY MASS INDEX: 26.66 KG/M2 | TEMPERATURE: 98.6 F | DIASTOLIC BLOOD PRESSURE: 70 MMHG | HEART RATE: 83 BPM | HEIGHT: 69 IN

## 2025-02-20 DIAGNOSIS — I65.23 CAROTID STENOSIS, BILATERAL: ICD-10-CM

## 2025-02-20 DIAGNOSIS — I69.354 HEMIPARESIS AFFECTING LEFT SIDE AS LATE EFFECT OF CEREBROVASCULAR ACCIDENT (HCC): ICD-10-CM

## 2025-02-20 DIAGNOSIS — I69.398 EPILEPSY DUE TO OLD STROKE (HCC): ICD-10-CM

## 2025-02-20 DIAGNOSIS — E78.2 MIXED HYPERLIPIDEMIA: ICD-10-CM

## 2025-02-20 DIAGNOSIS — I10 BENIGN ESSENTIAL HYPERTENSION: ICD-10-CM

## 2025-02-20 DIAGNOSIS — I69.30 HISTORY OF CEREBROVASCULAR ACCIDENT WITH RESIDUAL DEFICIT: ICD-10-CM

## 2025-02-20 DIAGNOSIS — N40.0 BENIGN PROSTATIC HYPERPLASIA WITHOUT LOWER URINARY TRACT SYMPTOMS: ICD-10-CM

## 2025-02-20 DIAGNOSIS — E11.51 TYPE 2 DIABETES MELLITUS WITH DIABETIC PERIPHERAL ANGIOPATHY WITHOUT GANGRENE, WITHOUT LONG-TERM CURRENT USE OF INSULIN (HCC): Primary | ICD-10-CM

## 2025-02-20 DIAGNOSIS — Z12.5 SCREENING FOR PROSTATE CANCER: ICD-10-CM

## 2025-02-20 DIAGNOSIS — G40.909 EPILEPSY DUE TO OLD STROKE (HCC): ICD-10-CM

## 2025-02-20 PROBLEM — F43.21 ADJUSTMENT DISORDER WITH DEPRESSED MOOD: Status: RESOLVED | Noted: 2019-03-22 | Resolved: 2025-02-20

## 2025-02-20 PROCEDURE — G2211 COMPLEX E/M VISIT ADD ON: HCPCS | Performed by: FAMILY MEDICINE

## 2025-02-20 PROCEDURE — 99214 OFFICE O/P EST MOD 30 MIN: CPT | Performed by: FAMILY MEDICINE

## 2025-02-20 NOTE — ASSESSMENT & PLAN NOTE
Lab Results   Component Value Date    HGBA1C 6.2 (H) 02/05/2025     Well controlled  A1c is at goal <7  Continue metformin 1000mg bid,   Orders:  •  Albumin / creatinine urine ratio; Future  •  Hemoglobin A1C; Future

## 2025-02-20 NOTE — PROGRESS NOTES
Name: Zeyad Anaya      : 1960      MRN: [id number]  Encounter Provider: Anatoly Samuels DO  Encounter Date: 2025   Encounter department: [facility]  :  Assessment & Plan  Type 2 diabetes mellitus with diabetic peripheral angiopathy without gangrene, without long-term current use of insulin (HCC)    Lab Results   Component Value Date    HGBA1C 6.2 (H) 2025     Well controlled  A1c is at goal <7  Continue metformin 1000mg bid,   Orders:  •  Albumin / creatinine urine ratio; Future  •  Hemoglobin A1C; Future    Benign essential hypertension  Lab Results   Component Value Date    CREATININE 0.48 (L) 2025     Well controlled  Bp is at goal <140/90  Continue losartan 50mg  Orders:  •  Albumin / creatinine urine ratio; Future  •  Comprehensive metabolic panel; Future    Carotid stenosis, bilateral  Stable  Had surveillance carotid artery ultrasound  Due for follow up with vascular       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)  Under care of neurology  No longer on keppra and no recent seizures       Benign prostatic hyperplasia without lower urinary tract symptoms  Stable         History of cerebrovascular accident with residual deficit  Continues to have left extremity weakness       Mixed hyperlipidemia  Lab Results   Component Value Date    LDLCALC 77 2025     Stable  Continue ezetimibe 10mg and atorvastatin 20mg  Orders:  •  Lipid panel; Future    Screening for prostate cancer    Orders:  •  PSA, Total Screen; Future    Hemiparesis affecting left side as late effect of cerebrovascular accident (HCC)  Stable                History of Present Illness   HPI presents for routine follow up.   Review of Systems   Constitutional:  Negative for activity change, chills, diaphoresis and fever.   HENT:  Negative for ear pain, hearing loss, postnasal drip, rhinorrhea, sinus pressure, sinus pain, sneezing and sore throat.    Respiratory:  Negative for cough,  "chest tightness, shortness of breath and wheezing.    Cardiovascular:  Negative for chest pain, palpitations and leg swelling.   Gastrointestinal:  Negative for abdominal pain, blood in stool, constipation, diarrhea, nausea and vomiting.   Genitourinary:  Negative for dysuria, frequency, hematuria and urgency.   Musculoskeletal:  Negative for arthralgias and myalgias.   Neurological:  Negative for dizziness, syncope, weakness, light-headedness, numbness and headaches.       Objective   /70 (BP Location: Left arm, Patient Position: Sitting, Cuff Size: Adult)   Pulse 83   Temp 98.6 °F (37 °C) (Temporal)   Ht 5' 9\" (1.753 m)   Wt 81.6 kg (180 lb)   SpO2 99%   BMI 26.58 kg/m²      Physical Exam  Vitals reviewed.   Constitutional:       General: He is not in acute distress.     Appearance: He is well-developed. He is not diaphoretic.   HENT:      Head: Normocephalic and atraumatic.      Right Ear: Tympanic membrane, ear canal and external ear normal. There is no impacted cerumen.      Left Ear: Tympanic membrane, ear canal and external ear normal. There is no impacted cerumen.      Nose: Nose normal. No congestion.      Mouth/Throat:      Mouth: Mucous membranes are moist.      Pharynx: Oropharynx is clear.   Eyes:      General: No scleral icterus.        Right eye: No discharge.         Left eye: No discharge.      Conjunctiva/sclera: Conjunctivae normal.      Pupils: Pupils are equal, round, and reactive to light.   Neck:      Vascular: No JVD.   Cardiovascular:      Rate and Rhythm: Normal rate and regular rhythm.      Heart sounds: Normal heart sounds. No murmur heard.     No friction rub.   Pulmonary:      Effort: Pulmonary effort is normal. No respiratory distress.      Breath sounds: Normal breath sounds. No wheezing or rales.   Chest:      Chest wall: No tenderness.   Abdominal:      General: Bowel sounds are normal. There is no distension.      Palpations: Abdomen is soft. There is no mass.      " Tenderness: There is no abdominal tenderness. There is no guarding or rebound.   Musculoskeletal:         General: No tenderness or deformity. Normal range of motion.      Cervical back: Normal range of motion and neck supple.   Skin:     General: Skin is warm and dry.      Findings: No erythema or rash.   Neurological:      Mental Status: He is alert and oriented to person, place, and time. Mental status is at baseline.      Cranial Nerves: No cranial nerve deficit.      Comments: Baseline left extremity weakness   Psychiatric:         Mood and Affect: Mood normal.

## 2025-02-20 NOTE — ASSESSMENT & PLAN NOTE
Lab Results   Component Value Date    LDLCALC 77 02/05/2025     Stable  Continue ezetimibe 10mg and atorvastatin 20mg  Orders:  •  Lipid panel; Future

## 2025-02-20 NOTE — ASSESSMENT & PLAN NOTE
Please schedule for MRI Review per Dr. Cantu.Thanks   Under care of neurology  No longer on keppra and no recent seizures

## 2025-02-20 NOTE — ASSESSMENT & PLAN NOTE
Lab Results   Component Value Date    CREATININE 0.48 (L) 02/05/2025     Well controlled  Bp is at goal <140/90  Continue losartan 50mg  Orders:  •  Albumin / creatinine urine ratio; Future  •  Comprehensive metabolic panel; Future

## 2025-03-25 ENCOUNTER — TELEPHONE (OUTPATIENT)
Dept: ADMINISTRATIVE | Facility: OTHER | Age: 65
End: 2025-03-25

## 2025-03-25 NOTE — LETTER
Diabetic Foot Exam Form    Date Requested: 25  Patient: Zeyad Anaya  Patient : 1960   Referring Provider: Anatoly Samuels DO    Diabetic Foot Exam Performed with shoes and socks removed        Yes         No     Date of Diabetic Foot Exam ______________________________  Risk Score ____________________________________________    Left Foot       Visual Inspection         Monofilament Testing Sensory Exam        Pedal Pulses         Additional Comments         Right Foot      Visual Inspection         Monofilament Testing Sensory Exam       Pedal Pulses         Additional Comments         Comments __________________________________________________________    Practice Providing Exam ______________________________________________    Exam Performed By (print name) _______________________________________      Provider Signature ___________________________________________________      These reports are needed for  compliance.    Please fax this completed form and a copy of the Diabetic Foot Exam report to the [facility] Department as soon as possible via Fax [phone], michelle Tyrese: Phone [phone]. Our office is located at [address] [place] [address].    We thank you for your assistance in treating our mutual patient.

## 2025-03-25 NOTE — TELEPHONE ENCOUNTER
----- Message from Chacha GARCIA sent at 3/25/2025 10:49 AM EDT -----  Regarding: care gap request  03/25/25 10:49 AM    Hello, our patient attached above has had Diabetic Foot Exam completed/performed. Please assist in updating the patient chart by making an External outreach to [place] [facility] located in [place] . The date of service is 2023/2025.    Thank you,  Chacha Leyva [facility]  [facility] [place]

## 2025-03-25 NOTE — TELEPHONE ENCOUNTER
As a follow-up, a second attempt has been made for outreach via telephone call to facility. Please see Contacts section for details.    Thank you  Tyrese Wolfe MA

## 2025-03-25 NOTE — TELEPHONE ENCOUNTER
Upon review of the In Basket request and the patient's chart, initial outreach has been made via fax to facility. Please see Contacts section for details.     Thank you  Tyrese Wolfe MA

## 2025-04-01 NOTE — TELEPHONE ENCOUNTER
Upon review of the In Basket request we were able to locate, review, and update the patient chart as requested for Diabetic Foot Exam.    Any additional questions or concerns should be emailed to the Practice Liaisons via the appropriate education email address, please do not reply via In Basket.    Thank you  Tyrese Wolfe MA   [facility]

## 2025-04-02 DIAGNOSIS — I10 BENIGN ESSENTIAL HYPERTENSION: ICD-10-CM

## 2025-04-02 RX ORDER — LOSARTAN POTASSIUM 50 MG/1
50 TABLET ORAL DAILY
Qty: 90 TABLET | Refills: 1 | Status: SHIPPED | OUTPATIENT
Start: 2025-04-02

## 2025-04-09 DIAGNOSIS — F32.A DEPRESSIVE DISORDER: ICD-10-CM

## 2025-04-09 RX ORDER — SERTRALINE HYDROCHLORIDE 100 MG/1
100 TABLET, FILM COATED ORAL DAILY
Qty: 90 TABLET | Refills: 1 | Status: SHIPPED | OUTPATIENT
Start: 2025-04-09

## 2025-04-28 ENCOUNTER — OFFICE VISIT (OUTPATIENT)
Dept: VASCULAR SURGERY | Facility: CLINIC | Age: 65
End: 2025-04-28
Payer: COMMERCIAL

## 2025-04-28 VITALS
BODY MASS INDEX: 26.26 KG/M2 | DIASTOLIC BLOOD PRESSURE: 66 MMHG | HEART RATE: 64 BPM | WEIGHT: 177.3 LBS | OXYGEN SATURATION: 99 % | SYSTOLIC BLOOD PRESSURE: 136 MMHG | HEIGHT: 69 IN

## 2025-04-28 DIAGNOSIS — F17.299 OTHER TOBACCO PRODUCT NICOTINE DEPENDENCE WITH NICOTINE-INDUCED DISORDER: ICD-10-CM

## 2025-04-28 DIAGNOSIS — I65.23 CAROTID STENOSIS, BILATERAL: ICD-10-CM

## 2025-04-28 DIAGNOSIS — I10 BENIGN ESSENTIAL HYPERTENSION: Primary | ICD-10-CM

## 2025-04-28 PROCEDURE — 99204 OFFICE O/P NEW MOD 45 MIN: CPT | Performed by: NURSE PRACTITIONER

## 2025-04-28 NOTE — ASSESSMENT & PLAN NOTE
Continue as needed melatonin at bedtime  Reviewed Carotid DU 2/12/25  R ICA chronically occluded  L ICA 50-69% stenosis 173/72 with ratio 6.57    -Baseline L arm/ hand paresis. Has 'stiff' left leg, has gait dysfunction. He denies any new stroke like symptoms in the office today. Has left facial droop. Tongue is midline.   -Reviewed most recent carotid ultrasound results in detail with pt and discussed pathophysiology of arterial disease and indication for vascular intervention. No vascular intervention planned at this time. Discussed that we will continue with medical management and non-invasive imaging at this time.     Plan  -Complete carotid ultrasound in 6 months and return to the office for review in one year  -Continue Aspirin 81mg daily.  -Continue Statin medication daily as prescribed by PCP.  -Call 911/ Go to the ER with any S/S of TIA/ CVA.  -Continue with BP control   -Smoking cessation  Orders:    Ambulatory Referral to Vascular Surgery    VAS carotid complete study; Future

## 2025-04-28 NOTE — ASSESSMENT & PLAN NOTE
Tobacco use is a significant patient-modifiable risk factor for this patient’s vascular disease with multiple vascular comorbidities, and a significant risk factor for failure of and complications from any endovascular or surgical interventions.    I explained to the patient the effects of smoking including peripheral artery disease, coronary artery disease, cerebrovascular disease as well as cancer and chronic obstructive pulmonary disease. I asked the patient to stop smoking immediately. It is never too late to quit, and many studies show significant health benefits as well as economical savings after smoking cessation. I offered to the patient nicotine replacement therapy as well as referral to the smoking cessation program and access to the quit line 7-686-XPROFCP or ambulatory referral to our network smoking cessation program.    Based on our conversation, this patient does not appear ready to quit    And declined my offer of nicotine replacement or tobacco cessation medications    The patient did not set a quit date. I will continue to  follow up on this issue at our next scheduled visit.     I spent approximately 5 minutes on tobacco cessation counseling with this patient.

## 2025-04-28 NOTE — PROGRESS NOTES
Name: Zeyad Anaya      : 1960      MRN: [id number]  Encounter Provider: FELICE Dan  Encounter Date: 2025   Enc[facility]    65y/o M current cigar smoker w/ hx of HTN, T2 DM, patent foramen ovale, Epilepsy due to old stroke, BPH, HLD, hx of R hemispheric CVA () w/ residual L hemiparesis, b/l carotid stenosis pt returns to the office for review of his carotid ultrasound.   Assessment & Plan  Carotid stenosis, bilateral  Reviewed Carotid DU 25  R ICA chronically occluded  L ICA 50-69% stenosis 173/72 with ratio 6.57    -Baseline L arm/ hand paresis. Has 'stiff' left leg, has gait dysfunction. He denies any new stroke like symptoms in the office today. Has left facial droop. Tongue is midline.   -Reviewed most recent carotid ultrasound results in detail with pt and discussed pathophysiology of arterial disease and indication for vascular intervention. No vascular intervention planned at this time. Discussed that we will continue with medical management and non-invasive imaging at this time.     Plan  -Complete carotid ultrasound in 6 months and return to the office for review in one year  -Continue Aspirin 81mg daily.  -Continue Statin medication daily as prescribed by PCP.  -Call 911/ Go to the ER with any S/S of TIA/ CVA.  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    I explained to the patient the effects of smoking  including peripheral artery disease, coronary artery disease, cerebrovascular disease as well as cancer and chronic obstructive pulmonary disease. I asked the patient to stop smoking immediately. It is never too late to quit, and many studies show significant health benefits as well as economical savings after smoking cessation. I offered to the patient nicotine replacement therapy as well as referral to the smoking cessation program and access to the quit line 0-633-YRLLQIV or ambulatory referral to our network smoking cessation program.    Based on our conversation, this patient does not appear ready to quit    And declined my offer of nicotine replacement or tobacco cessation medications    The patient did not set a quit date. I will continue to  follow up on this issue at our next scheduled visit.     I spent approximately 5 minutes on tobacco cessation counseling with this patient.               History of Present Illness   HPI  Zeyad Anaya is a 64 y.o. male HTN, T2 DM, patent foramen ovale, Epilepsy due to old stroke, BPH, HLD, hx of R hemispheric CVA (2014) w/ residual L hemiparesis, b/l carotid stenosis pt returns to the office for review of his carotid ultrasound.     Pt presents to the office unaccompanied. He is without complaints today. At baseline he has LUE paresis of the arm and hand. He reports he does have L leg weakness when 'he is tired' it is noted that he has gait dysfunction. He additionally has non-symetrical smile. Tongue is midline  -Pt reports he is doing well he works at [employer]  -Smokes cigars daily  -Compliant with Asa and statin therapy daily.    History obtained from: patient    Review of Systems   Eyes:  Negative for visual disturbance.   Neurological:  Negative for facial asymmetry, speech difficulty, weakness and headaches.   Psychiatric/Behavioral:  Negative for confusion.      Current Outpatient Medications on File Prior to Visit   Medication Sig Dispense  "Refill    aspirin 81 mg chewable tablet Chew 1 tablet every 24 hours      atorvastatin (LIPITOR) 20 mg tablet TAKE 1 TABLET BY MOUTH EVERY DAY 90 tablet 1    ezetimibe (ZETIA) 10 mg tablet TAKE 1 TABLET BY MOUTH EVERY DAY 90 tablet 1    Fluocinonide 0.1 % CREA Apply topically 2 (two) times a day as needed (rash) 60 g 0    glucose blood test strip Use as instructed  ONE TOUCH ULTRA 100 each 5    Lancets (onetouch ultrasoft) lancets Use daily 90 each 1    losartan (COZAAR) 50 mg tablet TAKE 1 TABLET BY MOUTH EVERY DAY 90 tablet 1    metFORMIN (GLUCOPHAGE) 1000 MG tablet TAKE 1 TABLET BY MOUTH TWICE A DAY WITH FOOD 180 tablet 1    OneTouch Ultra test strip USE 1 EACH IN THE MORNING USE AS INSTRUCTED 100 strip 3    sertraline (ZOLOFT) 100 mg tablet TAKE 1 TABLET BY MOUTH EVERY DAY 90 tablet 1     No current facility-administered medications on file prior to visit.         Objective   /66 (BP Location: Right arm, Patient Position: Sitting, Cuff Size: Standard)   Pulse 64   Ht 5' 9\" (1.753 m)   Wt 80.4 kg (177 lb 4.8 oz)   SpO2 99%   BMI 26.18 kg/m²      Physical Exam  Vitals reviewed.   Constitutional:       General: He is not in acute distress.     Appearance: Normal appearance. He is not ill-appearing.   HENT:      Head: Normocephalic and atraumatic.   Cardiovascular:      Rate and Rhythm: Normal rate.      Pulses: Normal pulses.           Radial pulses are 2+ on the right side and 2+ on the left side.      Heart sounds: No murmur heard.  Pulmonary:      Effort: Pulmonary effort is normal. No respiratory distress.      Breath sounds: Normal breath sounds.   Musculoskeletal:      Cervical back: No rigidity or tenderness.   Skin:     General: Skin is warm and dry.   Neurological:      Mental Status: He is alert and oriented to person, place, and time. Mental status is at baseline.      Motor: Weakness (L hand weakness, occasional L leg weakness when tired) present.      Gait: Gait normal.      Comments: LUE " sided weakness   Psychiatric:         Mood and Affect: Mood normal.         Behavior: Behavior normal.         Administrative Statements   I have spent a total time of 30 minutes in caring for this patient on the day of the visit/encounter including Diagnostic results, Instructions for management, Patient and family education, Risk factor reductions, Impressions, Documenting in the medical record, and Obtaining or reviewing history  .

## 2025-07-05 DIAGNOSIS — E78.49 OTHER HYPERLIPIDEMIA: ICD-10-CM

## 2025-07-05 DIAGNOSIS — E11.9 DIABETES MELLITUS WITHOUT COMPLICATION (HCC): ICD-10-CM

## 2025-07-06 DIAGNOSIS — E78.2 MIXED HYPERLIPIDEMIA: ICD-10-CM

## 2025-07-06 RX ORDER — ATORVASTATIN CALCIUM 20 MG/1
20 TABLET, FILM COATED ORAL DAILY
Qty: 90 TABLET | Refills: 1 | Status: SHIPPED | OUTPATIENT
Start: 2025-07-06

## 2025-07-08 RX ORDER — EZETIMIBE 10 MG/1
10 TABLET ORAL DAILY
Qty: 90 TABLET | Refills: 1 | Status: SHIPPED | OUTPATIENT
Start: 2025-07-08

## 2025-07-09 ENCOUNTER — VBI (OUTPATIENT)
Dept: ADMINISTRATIVE | Facility: OTHER | Age: 65
End: 2025-07-09

## 2025-07-09 NOTE — TELEPHONE ENCOUNTER
07/09/25 7:23 AM     Chart reviewed for Diabetic Eye Exam was/were submitted to the patient's insurance.     Quyen Merritt MA   [facility]

## 2025-08-13 ENCOUNTER — APPOINTMENT (OUTPATIENT)
Dept: LAB | Facility: HOSPITAL | Age: 65
End: 2025-08-13
Payer: COMMERCIAL

## 2025-08-13 ENCOUNTER — RA CDI HCC (OUTPATIENT)
Dept: OTHER | Facility: HOSPITAL | Age: 65
End: 2025-08-13

## 2025-08-13 DIAGNOSIS — E78.2 MIXED HYPERLIPIDEMIA: ICD-10-CM

## 2025-08-13 DIAGNOSIS — I10 BENIGN ESSENTIAL HYPERTENSION: ICD-10-CM

## 2025-08-13 DIAGNOSIS — E11.51 TYPE 2 DIABETES MELLITUS WITH DIABETIC PERIPHERAL ANGIOPATHY WITHOUT GANGRENE, WITHOUT LONG-TERM CURRENT USE OF INSULIN (HCC): ICD-10-CM

## 2025-08-13 DIAGNOSIS — Z12.5 SCREENING FOR PROSTATE CANCER: ICD-10-CM

## 2025-08-13 LAB
ALBUMIN SERPL BCG-MCNC: 4.3 G/DL (ref 3.5–5)
ALP SERPL-CCNC: 52 U/L (ref 34–104)
ALT SERPL W P-5'-P-CCNC: 14 U/L (ref 7–52)
ANION GAP SERPL CALCULATED.3IONS-SCNC: 5 MMOL/L (ref 4–13)
AST SERPL W P-5'-P-CCNC: 13 U/L (ref 13–39)
BILIRUB SERPL-MCNC: 0.39 MG/DL (ref 0.2–1)
BUN SERPL-MCNC: 10 MG/DL (ref 5–25)
CALCIUM SERPL-MCNC: 10.1 MG/DL (ref 8.4–10.2)
CHLORIDE SERPL-SCNC: 103 MMOL/L (ref 96–108)
CHOLEST SERPL-MCNC: 128 MG/DL (ref ?–200)
CO2 SERPL-SCNC: 30 MMOL/L (ref 21–32)
CREAT SERPL-MCNC: 0.51 MG/DL (ref 0.6–1.3)
CREAT UR-MCNC: 40.8 MG/DL
GFR SERPL CREATININE-BSD FRML MDRD: 114 ML/MIN/1.73SQ M
GLUCOSE P FAST SERPL-MCNC: 128 MG/DL (ref 65–99)
HDLC SERPL-MCNC: 36 MG/DL
LDLC SERPL CALC-MCNC: 68 MG/DL (ref 0–100)
MICROALBUMIN UR-MCNC: <7 MG/L
NONHDLC SERPL-MCNC: 92 MG/DL
POTASSIUM SERPL-SCNC: 4.7 MMOL/L (ref 3.5–5.3)
PROT SERPL-MCNC: 6.7 G/DL (ref 6.4–8.4)
PSA SERPL-MCNC: 2.91 NG/ML (ref 0–4)
SODIUM SERPL-SCNC: 138 MMOL/L (ref 135–147)
TRIGL SERPL-MCNC: 121 MG/DL (ref ?–150)

## 2025-08-13 PROCEDURE — 36415 COLL VENOUS BLD VENIPUNCTURE: CPT

## 2025-08-13 PROCEDURE — 83036 HEMOGLOBIN GLYCOSYLATED A1C: CPT

## 2025-08-13 PROCEDURE — G0103 PSA SCREENING: HCPCS

## 2025-08-13 PROCEDURE — 82043 UR ALBUMIN QUANTITATIVE: CPT

## 2025-08-13 PROCEDURE — 82570 ASSAY OF URINE CREATININE: CPT

## 2025-08-13 PROCEDURE — 80061 LIPID PANEL: CPT

## 2025-08-13 PROCEDURE — 80053 COMPREHEN METABOLIC PANEL: CPT

## 2025-08-14 LAB
EST. AVERAGE GLUCOSE BLD GHB EST-MCNC: 131 MG/DL
HBA1C MFR BLD: 6.2 %

## 2025-08-15 ENCOUNTER — TELEPHONE (OUTPATIENT)
Dept: NEUROLOGY | Facility: CLINIC | Age: 65
End: 2025-08-15

## 2025-08-22 ENCOUNTER — OFFICE VISIT (OUTPATIENT)
Dept: FAMILY MEDICINE CLINIC | Facility: CLINIC | Age: 65
End: 2025-08-22
Payer: COMMERCIAL

## 2025-08-22 VITALS
WEIGHT: 177.2 LBS | SYSTOLIC BLOOD PRESSURE: 104 MMHG | HEIGHT: 69 IN | BODY MASS INDEX: 26.25 KG/M2 | DIASTOLIC BLOOD PRESSURE: 60 MMHG | TEMPERATURE: 97.9 F | HEART RATE: 76 BPM | OXYGEN SATURATION: 98 %

## 2025-08-22 DIAGNOSIS — F32.4 MAJOR DEPRESSIVE DISORDER WITH SINGLE EPISODE, IN PARTIAL REMISSION (HCC): ICD-10-CM

## 2025-08-22 DIAGNOSIS — G40.909 EPILEPSY DUE TO OLD STROKE (HCC): ICD-10-CM

## 2025-08-22 DIAGNOSIS — I10 BENIGN ESSENTIAL HYPERTENSION: ICD-10-CM

## 2025-08-22 DIAGNOSIS — Z12.11 SCREENING FOR COLON CANCER: ICD-10-CM

## 2025-08-22 DIAGNOSIS — I69.30 HISTORY OF CEREBROVASCULAR ACCIDENT WITH RESIDUAL DEFICIT: ICD-10-CM

## 2025-08-22 DIAGNOSIS — H61.23 HEARING LOSS DUE TO CERUMEN IMPACTION, BILATERAL: ICD-10-CM

## 2025-08-22 DIAGNOSIS — E11.51 TYPE 2 DIABETES MELLITUS WITH DIABETIC PERIPHERAL ANGIOPATHY WITHOUT GANGRENE, WITHOUT LONG-TERM CURRENT USE OF INSULIN (HCC): ICD-10-CM

## 2025-08-22 DIAGNOSIS — E11.59 TYPE 2 DIABETES MELLITUS WITH OTHER CIRCULATORY COMPLICATION, WITHOUT LONG-TERM CURRENT USE OF INSULIN (HCC): ICD-10-CM

## 2025-08-22 DIAGNOSIS — E78.2 MIXED HYPERLIPIDEMIA: ICD-10-CM

## 2025-08-22 DIAGNOSIS — M79.673 PAIN OF FOOT, UNSPECIFIED LATERALITY: ICD-10-CM

## 2025-08-22 DIAGNOSIS — N40.0 BENIGN PROSTATIC HYPERPLASIA WITHOUT LOWER URINARY TRACT SYMPTOMS: ICD-10-CM

## 2025-08-22 DIAGNOSIS — Z00.00 MEDICARE ANNUAL WELLNESS VISIT, SUBSEQUENT: Primary | ICD-10-CM

## 2025-08-22 DIAGNOSIS — I65.23 CAROTID STENOSIS, BILATERAL: ICD-10-CM

## 2025-08-22 DIAGNOSIS — I69.398 EPILEPSY DUE TO OLD STROKE (HCC): ICD-10-CM
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
## 2025-08-22 PROCEDURE — G2211 COMPLEX E/M VISIT ADD ON: HCPCS | Performed by: FAMILY MEDICINE

## 2025-08-22 PROCEDURE — 99214 OFFICE O/P EST MOD 30 MIN: CPT | Performed by: FAMILY MEDICINE

## 2025-08-22 PROCEDURE — G0439 PPPS, SUBSEQ VISIT: HCPCS | Performed by: FAMILY MEDICINE

## 2025-08-22 PROCEDURE — 69210 REMOVE IMPACTED EAR WAX UNI: CPT | Performed by: FAMILY MEDICINE

## 2025-08-22 RX ORDER — SENNOSIDES 8.6 MG
650 CAPSULE ORAL EVERY 8 HOURS PRN
Start: 2025-08-22